# Patient Record
Sex: FEMALE | Race: WHITE | NOT HISPANIC OR LATINO | Employment: OTHER | URBAN - NONMETROPOLITAN AREA
[De-identification: names, ages, dates, MRNs, and addresses within clinical notes are randomized per-mention and may not be internally consistent; named-entity substitution may affect disease eponyms.]

---

## 2017-03-17 ENCOUNTER — OFFICE VISIT (OUTPATIENT)
Dept: CARDIOLOGY | Facility: CLINIC | Age: 57
End: 2017-03-17

## 2017-03-17 VITALS
SYSTOLIC BLOOD PRESSURE: 136 MMHG | OXYGEN SATURATION: 100 % | HEIGHT: 67 IN | HEART RATE: 63 BPM | WEIGHT: 293 LBS | DIASTOLIC BLOOD PRESSURE: 80 MMHG | BODY MASS INDEX: 45.99 KG/M2

## 2017-03-17 DIAGNOSIS — E11.9 TYPE 2 DIABETES MELLITUS WITHOUT COMPLICATION, WITHOUT LONG-TERM CURRENT USE OF INSULIN (HCC): ICD-10-CM

## 2017-03-17 DIAGNOSIS — E78.2 MIXED HYPERLIPIDEMIA: ICD-10-CM

## 2017-03-17 DIAGNOSIS — R00.2 PALPITATION: ICD-10-CM

## 2017-03-17 DIAGNOSIS — R07.9 CHEST PAIN, UNSPECIFIED TYPE: Primary | ICD-10-CM

## 2017-03-17 DIAGNOSIS — E66.01 MORBID OBESITY DUE TO EXCESS CALORIES (HCC): ICD-10-CM

## 2017-03-17 DIAGNOSIS — G47.33 OSA TREATED WITH BIPAP: ICD-10-CM

## 2017-03-17 DIAGNOSIS — I10 ESSENTIAL HYPERTENSION: ICD-10-CM

## 2017-03-17 DIAGNOSIS — R06.09 DOE (DYSPNEA ON EXERTION): ICD-10-CM

## 2017-03-17 PROCEDURE — 93000 ELECTROCARDIOGRAM COMPLETE: CPT | Performed by: INTERNAL MEDICINE

## 2017-03-17 PROCEDURE — 99204 OFFICE O/P NEW MOD 45 MIN: CPT | Performed by: INTERNAL MEDICINE

## 2017-03-17 RX ORDER — VENLAFAXINE HYDROCHLORIDE 150 MG/1
CAPSULE, EXTENDED RELEASE ORAL
COMMUNITY
End: 2017-03-17

## 2017-03-17 RX ORDER — NABUMETONE 750 MG/1
TABLET, FILM COATED ORAL
COMMUNITY
End: 2017-04-14

## 2017-03-17 RX ORDER — ONDANSETRON 4 MG/1
4 TABLET, FILM COATED ORAL EVERY 8 HOURS
Status: ON HOLD | COMMUNITY
Start: 2016-06-28 | End: 2017-04-19

## 2017-03-17 RX ORDER — DIAZEPAM 5 MG/1
TABLET ORAL EVERY 8 HOURS
COMMUNITY
End: 2017-03-17 | Stop reason: SDUPTHER

## 2017-03-17 RX ORDER — CIPROFLOXACIN 250 MG/1
TABLET, FILM COATED ORAL
COMMUNITY
Start: 2017-03-11 | End: 2017-04-14

## 2017-03-17 RX ORDER — GLYBURIDE 2.5 MG/1
TABLET ORAL
COMMUNITY
End: 2017-04-14

## 2017-03-17 RX ORDER — PROMETHAZINE HYDROCHLORIDE AND CODEINE PHOSPHATE 6.25; 1 MG/5ML; MG/5ML
SYRUP ORAL
COMMUNITY
End: 2017-04-14

## 2017-03-17 RX ORDER — LISINOPRIL 10 MG/1
TABLET ORAL
COMMUNITY
End: 2017-10-16

## 2017-03-17 RX ORDER — ESCITALOPRAM OXALATE 20 MG/1
TABLET ORAL
COMMUNITY
Start: 2016-12-20 | End: 2017-03-17 | Stop reason: SDUPTHER

## 2017-03-17 RX ORDER — DOXEPIN HYDROCHLORIDE 100 MG/1
100 CAPSULE ORAL NIGHTLY
COMMUNITY
Start: 2017-03-10 | End: 2019-07-29

## 2017-03-17 RX ORDER — DICLOFENAC SODIUM 75 MG/1
75 TABLET, DELAYED RELEASE ORAL 2 TIMES DAILY
COMMUNITY
Start: 2017-03-10 | End: 2017-04-20 | Stop reason: HOSPADM

## 2017-03-17 RX ORDER — ESCITALOPRAM OXALATE 20 MG/1
TABLET ORAL
COMMUNITY
End: 2018-01-22

## 2017-03-17 RX ORDER — ALBUTEROL SULFATE 90 UG/1
2 AEROSOL, METERED RESPIRATORY (INHALATION)
COMMUNITY

## 2017-03-17 RX ORDER — KETOCONAZOLE 200 MG/1
TABLET ORAL DAILY PRN
COMMUNITY

## 2017-03-17 RX ORDER — SIMVASTATIN 10 MG
TABLET ORAL
COMMUNITY
End: 2017-03-17

## 2017-03-17 RX ORDER — METOPROLOL TARTRATE 50 MG/1
TABLET, FILM COATED ORAL
COMMUNITY
Start: 2017-02-15 | End: 2017-03-17 | Stop reason: SDUPTHER

## 2017-03-17 RX ORDER — VITAMIN E 200 UNIT
CAPSULE ORAL
COMMUNITY
End: 2020-03-17

## 2017-03-17 RX ORDER — METHOCARBAMOL 500 MG/1
TABLET, FILM COATED ORAL
COMMUNITY
Start: 2016-12-14 | End: 2017-04-14

## 2017-03-17 RX ORDER — LISINOPRIL 10 MG/1
TABLET ORAL
COMMUNITY
Start: 2017-03-03 | End: 2017-03-17 | Stop reason: SDUPTHER

## 2017-03-17 RX ORDER — PHENOL 1.4 %
AEROSOL, SPRAY (ML) MUCOUS MEMBRANE
Status: ON HOLD | COMMUNITY
End: 2017-04-19

## 2017-03-17 RX ORDER — METOPROLOL SUCCINATE 25 MG/1
TABLET, EXTENDED RELEASE ORAL
COMMUNITY
Start: 2016-12-09 | End: 2017-04-14 | Stop reason: SDUPTHER

## 2017-03-17 RX ORDER — POTASSIUM CHLORIDE 20 MEQ/1
TABLET, EXTENDED RELEASE ORAL
COMMUNITY
End: 2017-04-14

## 2017-03-17 RX ORDER — METOPROLOL SUCCINATE 50 MG/1
TABLET, EXTENDED RELEASE ORAL 2 TIMES DAILY
COMMUNITY
End: 2017-03-17 | Stop reason: SDUPTHER

## 2017-03-17 RX ORDER — VENLAFAXINE HYDROCHLORIDE 150 MG/1
300 CAPSULE, EXTENDED RELEASE ORAL DAILY
COMMUNITY
Start: 2016-12-20 | End: 2018-01-22

## 2017-03-17 RX ORDER — OXYCODONE AND ACETAMINOPHEN 10; 325 MG/1; MG/1
TABLET ORAL
COMMUNITY
Start: 2016-06-28 | End: 2017-04-14

## 2017-03-17 RX ORDER — DIAZEPAM 5 MG/1
5 TABLET ORAL EVERY 12 HOURS PRN
COMMUNITY
Start: 2017-02-24

## 2017-03-17 RX ORDER — GLIPIZIDE 10 MG/1
10 TABLET ORAL 3 TIMES DAILY
COMMUNITY
Start: 2017-02-27 | End: 2017-12-21

## 2017-03-17 RX ORDER — FEXOFENADINE HCL 180 MG/1
TABLET ORAL
COMMUNITY
End: 2021-05-17

## 2017-03-17 RX ORDER — MECLIZINE HCL 12.5 MG/1
12.5 TABLET ORAL 3 TIMES DAILY PRN
COMMUNITY

## 2017-03-17 RX ORDER — LOSARTAN POTASSIUM AND HYDROCHLOROTHIAZIDE 12.5; 5 MG/1; MG/1
1 TABLET ORAL 2 TIMES DAILY
COMMUNITY
End: 2019-07-29

## 2017-03-17 RX ORDER — TIZANIDINE 4 MG/1
4 TABLET ORAL 3 TIMES DAILY PRN
COMMUNITY
Start: 2017-02-06

## 2017-03-17 RX ORDER — TRAMADOL HYDROCHLORIDE 50 MG/1
50 TABLET ORAL EVERY 6 HOURS PRN
COMMUNITY
Start: 2017-02-15 | End: 2020-05-15 | Stop reason: HOSPADM

## 2017-03-17 RX ORDER — TRAMADOL HYDROCHLORIDE 50 MG/1
TABLET ORAL EVERY 6 HOURS
COMMUNITY
End: 2017-03-17

## 2017-03-17 RX ORDER — CYCLOBENZAPRINE HCL 10 MG
TABLET ORAL 3 TIMES DAILY
COMMUNITY
End: 2017-04-14

## 2017-03-17 RX ORDER — GLIPIZIDE 5 MG/1
TABLET ORAL
COMMUNITY
Start: 2016-12-09 | End: 2017-03-17 | Stop reason: SDUPTHER

## 2017-03-17 RX ORDER — DOXEPIN HYDROCHLORIDE 100 MG/1
CAPSULE ORAL
COMMUNITY
End: 2017-03-17 | Stop reason: SDUPTHER

## 2017-03-17 RX ORDER — FLUCONAZOLE 150 MG/1
TABLET ORAL
COMMUNITY
Start: 2017-01-19 | End: 2017-04-14

## 2017-03-17 RX ORDER — SIMVASTATIN 10 MG
10 TABLET ORAL NIGHTLY
COMMUNITY
Start: 2017-03-10

## 2017-03-17 RX ORDER — HYDROXYZINE PAMOATE 50 MG/1
CAPSULE ORAL
COMMUNITY
End: 2019-07-29

## 2017-03-17 NOTE — PROGRESS NOTES
Barbara Tapia  [id number]  1960  57 y.o.  female    Referring Provider: Briana Bartlett MD    Reason for  Visit: Shortness of breath, chest pain and bradycardia  Chest pain with exertion  No diaphoresis  No nausea  No radiation  Precipitated with exertion  Moderate dypnea  Compliant with medications    History of present illness:  Barbara Tapia is a 57 y.o. yo female with history of HTN who presents today for   Chief Complaint   Patient presents with   • Slow Heart Rate     new pt   • Dizziness     when standing and walking   .    History  Past Medical History   Diagnosis Date   • Asthma    • Diabetes mellitus    • Hypertension    • Knee contracture      total   • Sleep apnea    ,   Past Surgical History   Procedure Laterality Date   •  section     • Partial hymenectomy     ,   Family History   Problem Relation Age of Onset   • Heart disease Mother    • Heart disease Father    ,   Social History   Substance Use Topics   • Smoking status: Never Smoker   • Smokeless tobacco: Never Used   • Alcohol use No   ,     Medications  Current Outpatient Prescriptions   Medication Sig Dispense Refill   • ondansetron (ZOFRAN) 4 MG tablet Every 8 (Eight) Hours.     • oxyCODONE-acetaminophen (PERCOCET)  MG per tablet 1-2 tabs po q4-6 hrs prn pain     • albuterol (PROVENTIL HFA;VENTOLIN HFA) 108 (90 BASE) MCG/ACT inhaler Every 6 (Six) Hours.     • bisacodyl (DULCOLAX) 5 MG EC tablet Daily.     • ciprofloxacin (CIPRO) 250 MG tablet      • cyclobenzaprine (FLEXERIL) 10 MG tablet 3 (Three) Times a Day.     • diazePAM (VALIUM) 5 MG tablet      • diclofenac (VOLTAREN) 75 MG EC tablet      • doxepin (SINEquan) 100 MG capsule      • escitalopram (LEXAPRO) 20 MG tablet Take 20 mg by mouth daily     • fexofenadine (ALLEGRA) 180 MG tablet Take 180 mg by mouth daily     • fluconazole (DIFLUCAN) 150 MG tablet      • glipiZIDE (GLUCOTROL) 10 MG tablet      • glyBURIDE (DIAbeta) 2.5 MG tablet Take 2.5 mg by mouth 2 times daily  (with meals)     • hydrOXYzine (VISTARIL) 50 MG capsule Take 50 mg by mouth 2 times daily     • ketoconazole (NIZORAL) 200 MG tablet Take 200 mg by mouth daily     • lisinopril (PRINIVIL,ZESTRIL) 10 MG tablet Take 10 mg by mouth 2 times daily     • losartan-hydrochlorothiazide (HYZAAR) 50-12.5 MG per tablet 2 (Two) Times a Day.     • meclizine (ANTIVERT) 12.5 MG tablet 3 (Three) Times a Day.     • MEGARED OMEGA-3 KRILL  MG capsule Take by mouth daily     • metFORMIN (GLUCOPHAGE) 500 MG tablet      • methocarbamol (ROBAXIN) 500 MG tablet      • metoprolol succinate XL (TOPROL-XL) 25 MG 24 hr tablet      • metoprolol tartrate (LOPRESSOR) 25 MG tablet Take 0.5 tablets by mouth 2 (Two) Times a Day. 45 tablet 3   • Multiple Vitamins-Minerals (MULTIVITAMIN ADULTS 50+) tablet Take by mouth daily     • nabumetone (RELAFEN) 750 MG tablet Take 750 mg by mouth 2 times daily Indications: take 2 tablets twice daily     • nystatin (MYCOSTATIN) 072588 UNIT/ML suspension 4 (Four) Times a Day.     • potassium chloride (K-DUR,KLOR-CON) 20 MEQ CR tablet Take 20 mEq by mouth daily     • PROAIR  (90 BASE) MCG/ACT inhaler      • PROBIOTIC PRODUCT PO Take by mouth     • promethazine-codeine (PHENERGAN with CODEINE) 6.25-10 MG/5ML syrup Take 5 mLs by mouth 2 times daily     • simvastatin (ZOCOR) 10 MG tablet      • tiZANidine (ZANAFLEX) 4 MG tablet      • traMADol (ULTRAM) 50 MG tablet      • venlafaxine XR (EFFEXOR-XR) 150 MG 24 hr capsule        No current facility-administered medications for this visit.        Allergies:  Azelastine-fluticasone; Contrast dye; Iodides; Metformin and related; Oxycodone-aspirin; Pregabalin; Sulfa antibiotics; and Nisoldipine er    Review of Systems  Review of Systems   Constitution: Negative.   HENT: Negative.    Eyes: Negative.    Cardiovascular: Positive for dyspnea on exertion and palpitations. Negative for chest pain, claudication, cyanosis, irregular heartbeat, leg swelling,  "near-syncope, orthopnea, paroxysmal nocturnal dyspnea and syncope.   Respiratory: Negative.    Endocrine: Negative.    Hematologic/Lymphatic: Negative.    Skin: Negative.    Gastrointestinal: Negative for anorexia.   Genitourinary: Negative.    Neurological: Positive for dizziness.   Psychiatric/Behavioral: Negative.        Objective     Physical Exam:  Visit Vitals   • /80   • Pulse 63   • Ht 67\" (170.2 cm)   • Wt 297 lb (135 kg)   • SpO2 100%   • BMI 46.52 kg/m2     Physical Exam   Constitutional: She appears well-developed.   HENT:   Head: Normocephalic.   Neck: Normal carotid pulses and no JVD present. No tracheal tenderness present. Carotid bruit is not present. No tracheal deviation and no edema present.   Cardiovascular: Regular rhythm, normal heart sounds and normal pulses.    Pulmonary/Chest: Effort normal. No stridor.   Abdominal: Soft.   Neurological: She is alert. She has normal strength. No cranial nerve deficit or sensory deficit.   Skin: Skin is warm.   Psychiatric: She has a normal mood and affect. Her speech is normal and behavior is normal.       Results Review:       ECG 12 Lead  Date/Time: 3/17/2017 10:29 AM  Performed by: JERMAINE CHAVEZ  Authorized by: JERMAINE CHAVEZ   Comparison: not compared with previous ECG   Rhythm: sinus rhythm  Rate: normal  Clinical impression: normal ECG            Assessment/Plan   Patient Active Problem List   Diagnosis   • Palpitation   • Morbid obesity due to excess calories   • BLADIMIR treated with BiPAP   • Chest pain   • Essential hypertension   • Type 2 diabetes mellitus without complication, without long-term current use of insulin   • Mixed hyperlipidemia       Intermittent  palpitations. No significant pedal edema. Compliant with medications and diet. Latest labs and medications reviewed.    Plan:  CBC CMP TSH BNP  Echo  Stop Lisinopril  Dobutamine stress test   Holter  Decrease Metoprolol from 25 to 12.5 mg BID  Stop Metoprolol if heart rates less than " 50  Take Zanaflex PRN  Close follow up with you as scheduled.  Intensive factor modifications.  See order list.    Counseled regarding disease appropriate diet, fluid, caffeine, stimulants and sodium intake as well as importance of compliance to diet, exercise and regular follow up.  Avoid NSAIDS and COX2 inhibitors. Use Acetaminophen PRN.    Return in about 4 weeks (around 4/14/2017).

## 2017-04-06 ENCOUNTER — HOSPITAL ENCOUNTER (OUTPATIENT)
Dept: CARDIOLOGY | Facility: HOSPITAL | Age: 57
Discharge: HOME | End: 2017-04-06
Attending: INTERNAL MEDICINE

## 2017-04-06 ENCOUNTER — HOSPITAL ENCOUNTER (OUTPATIENT)
Dept: CARDIOLOGY | Facility: HOSPITAL | Age: 57
Discharge: HOME | End: 2017-04-06
Attending: INTERNAL MEDICINE | Admitting: INTERNAL MEDICINE

## 2017-04-06 VITALS — BODY MASS INDEX: 45.99 KG/M2 | HEART RATE: 59 BPM | WEIGHT: 293 LBS | HEIGHT: 67 IN

## 2017-04-06 VITALS
WEIGHT: 293 LBS | HEIGHT: 67 IN | BODY MASS INDEX: 45.99 KG/M2 | DIASTOLIC BLOOD PRESSURE: 73 MMHG | SYSTOLIC BLOOD PRESSURE: 147 MMHG

## 2017-04-06 DIAGNOSIS — R00.2 PALPITATION: ICD-10-CM

## 2017-04-06 DIAGNOSIS — R07.9 CHEST PAIN, UNSPECIFIED TYPE: ICD-10-CM

## 2017-04-06 LAB
BH CV ECHO MEAS - BSA(HAYCOCK): 2.6 M^2
BH CV ECHO MEAS - BSA: 2.4 M^2
BH CV ECHO MEAS - BZI_BMI: 46.5 KILOGRAMS/M^2
BH CV ECHO MEAS - BZI_METRIC_HEIGHT: 170.2 CM
BH CV ECHO MEAS - BZI_METRIC_WEIGHT: 134.7 KG
BH CV STRESS BP STAGE 1: NORMAL
BH CV STRESS BP STAGE 2: NORMAL
BH CV STRESS BP STAGE 3: NORMAL
BH CV STRESS DOB - ATROPINE STAGE 3: 1
BH CV STRESS DOSE DOBUTAMINE STAGE 1: 10
BH CV STRESS DOSE DOBUTAMINE STAGE 2: 20
BH CV STRESS DOSE DOBUTAMINE STAGE 3: 30
BH CV STRESS DURATION MIN STAGE 1: 3
BH CV STRESS DURATION MIN STAGE 2: 4
BH CV STRESS DURATION MIN STAGE 3: 4
BH CV STRESS DURATION SEC STAGE 1: 0
BH CV STRESS DURATION SEC STAGE 2: 0
BH CV STRESS DURATION SEC STAGE 3: 54
BH CV STRESS ECHO POST STRESS EJECTION FRACTION EF: 65 %
BH CV STRESS HR STAGE 1: 64
BH CV STRESS HR STAGE 2: 80
BH CV STRESS HR STAGE 3: 142
BH CV STRESS PROTOCOL 1: NORMAL
BH CV STRESS RATE STAGE 1: 81
BH CV STRESS RATE STAGE 2: 162
BH CV STRESS RATE STAGE 3: 243
BH CV STRESS RECOVERY BP: NORMAL MMHG
BH CV STRESS RECOVERY HR: 90 BPM
BH CV STRESS STAGE 1: 1
BH CV STRESS STAGE 2: 2
BH CV STRESS STAGE 3: 3
LV EF 2D ECHO EST: 55 %
MAXIMAL PREDICTED HEART RATE: 161 BPM
PERCENT MAX PREDICTED HR: 88.2 %
STRESS BASELINE BP: NORMAL MMHG
STRESS BASELINE HR: 60 BPM
STRESS PERCENT HR: 104 %
STRESS POST PEAK BP: NORMAL MMHG
STRESS POST PEAK HR: 142 BPM
STRESS TARGET HR: 137 BPM

## 2017-04-06 PROCEDURE — 93018 CV STRESS TEST I&R ONLY: CPT | Performed by: INTERNAL MEDICINE

## 2017-04-06 PROCEDURE — C8928 TTE W OR W/O FOL W/CON,STRES: HCPCS

## 2017-04-06 PROCEDURE — 93306 TTE W/DOPPLER COMPLETE: CPT | Performed by: INTERNAL MEDICINE

## 2017-04-06 PROCEDURE — 93350 STRESS TTE ONLY: CPT | Performed by: INTERNAL MEDICINE

## 2017-04-06 PROCEDURE — 25010000003 DOBUTAMINE PER 250 MG: Performed by: INTERNAL MEDICINE

## 2017-04-06 PROCEDURE — 93226 XTRNL ECG REC<48 HR SCAN A/R: CPT

## 2017-04-06 PROCEDURE — 93225 XTRNL ECG REC<48 HRS REC: CPT

## 2017-04-06 PROCEDURE — 25010000002 PERFLUTREN (DEFINITY) 8.476 MG IN SODIUM CHLORIDE 10 ML INJECTION: Performed by: INTERNAL MEDICINE

## 2017-04-06 PROCEDURE — 93306 TTE W/DOPPLER COMPLETE: CPT

## 2017-04-06 PROCEDURE — 93017 CV STRESS TEST TRACING ONLY: CPT

## 2017-04-06 PROCEDURE — 93352 ADMIN ECG CONTRAST AGENT: CPT | Performed by: INTERNAL MEDICINE

## 2017-04-06 RX ORDER — DOBUTAMINE HYDROCHLORIDE 100 MG/100ML
10-50 INJECTION INTRAVENOUS
Status: DISCONTINUED | OUTPATIENT
Start: 2017-04-06 | End: 2017-04-07 | Stop reason: HOSPADM

## 2017-04-06 RX ORDER — ATROPINE SULFATE 1 MG/ML
1 INJECTION, SOLUTION INTRAMUSCULAR; INTRAVENOUS; SUBCUTANEOUS ONCE
Status: COMPLETED | OUTPATIENT
Start: 2017-04-06 | End: 2017-04-06

## 2017-04-06 RX ORDER — ATROPINE SULFATE 1 MG/ML
0.5 INJECTION, SOLUTION INTRAMUSCULAR; INTRAVENOUS; SUBCUTANEOUS ONCE
Status: DISCONTINUED | OUTPATIENT
Start: 2017-04-06 | End: 2017-04-06

## 2017-04-06 RX ADMIN — DOBUTAMINE HYDROCHLORIDE 10 MCG/KG/MIN: 100 INJECTION INTRAVENOUS at 10:33

## 2017-04-06 RX ADMIN — SODIUM CHLORIDE 5 ML: 9 INJECTION INTRAMUSCULAR; INTRAVENOUS; SUBCUTANEOUS at 10:23

## 2017-04-06 RX ADMIN — ATROPINE SULFATE 1 MG: 1 INJECTION, SOLUTION INTRAMUSCULAR; INTRAVENOUS; SUBCUTANEOUS at 10:42

## 2017-04-08 LAB
BH CV ECHO MEAS - AO MAX PG (FULL): 1.9 MMHG
BH CV ECHO MEAS - AO MAX PG: 6.4 MMHG
BH CV ECHO MEAS - AO MEAN PG (FULL): 2 MMHG
BH CV ECHO MEAS - AO MEAN PG: 4 MMHG
BH CV ECHO MEAS - AO ROOT AREA (BSA CORRECTED): 1.3
BH CV ECHO MEAS - AO ROOT AREA: 7.1 CM^2
BH CV ECHO MEAS - AO ROOT DIAM: 3 CM
BH CV ECHO MEAS - AO V2 MAX: 126 CM/SEC
BH CV ECHO MEAS - AO V2 MEAN: 93.9 CM/SEC
BH CV ECHO MEAS - AO V2 VTI: 31.6 CM
BH CV ECHO MEAS - AVA(I,A): 2.5 CM^2
BH CV ECHO MEAS - AVA(I,D): 2.5 CM^2
BH CV ECHO MEAS - AVA(V,A): 2.6 CM^2
BH CV ECHO MEAS - AVA(V,D): 2.6 CM^2
BH CV ECHO MEAS - BSA(HAYCOCK): 2.6 M^2
BH CV ECHO MEAS - BSA: 2.4 M^2
BH CV ECHO MEAS - BZI_BMI: 46.5 KILOGRAMS/M^2
BH CV ECHO MEAS - BZI_METRIC_HEIGHT: 170.2 CM
BH CV ECHO MEAS - BZI_METRIC_WEIGHT: 134.7 KG
BH CV ECHO MEAS - CONTRAST EF 4CH: 64.2 ML/M^2
BH CV ECHO MEAS - EDV(CUBED): 114.1 ML
BH CV ECHO MEAS - EDV(MOD-SP4): 141 ML
BH CV ECHO MEAS - EDV(TEICH): 110.2 ML
BH CV ECHO MEAS - EF(CUBED): 73.1 %
BH CV ECHO MEAS - EF(MOD-SP4): 64.2 %
BH CV ECHO MEAS - EF(TEICH): 64.8 %
BH CV ECHO MEAS - ESV(CUBED): 30.7 ML
BH CV ECHO MEAS - ESV(MOD-SP4): 50.5 ML
BH CV ECHO MEAS - ESV(TEICH): 38.8 ML
BH CV ECHO MEAS - FS: 35.5 %
BH CV ECHO MEAS - IVS/LVPW: 0.99
BH CV ECHO MEAS - IVSD: 1.1 CM
BH CV ECHO MEAS - LA DIMENSION: 3.6 CM
BH CV ECHO MEAS - LA/AO: 1.2
BH CV ECHO MEAS - LAT PEAK E' VEL: 9.7 CM/SEC
BH CV ECHO MEAS - LV DIASTOLIC VOL/BSA (35-75): 58.9 ML/M^2
BH CV ECHO MEAS - LV MASS(C)D: 203.5 GRAMS
BH CV ECHO MEAS - LV MASS(C)DI: 85.1 GRAMS/M^2
BH CV ECHO MEAS - LV MAX PG: 4.4 MMHG
BH CV ECHO MEAS - LV MEAN PG: 2 MMHG
BH CV ECHO MEAS - LV SYSTOLIC VOL/BSA (12-30): 21.1 ML/M^2
BH CV ECHO MEAS - LV V1 MAX: 105 CM/SEC
BH CV ECHO MEAS - LV V1 MEAN: 68.9 CM/SEC
BH CV ECHO MEAS - LV V1 VTI: 25.3 CM
BH CV ECHO MEAS - LVIDD: 4.9 CM
BH CV ECHO MEAS - LVIDS: 3.1 CM
BH CV ECHO MEAS - LVLD AP4: 8.7 CM
BH CV ECHO MEAS - LVLS AP4: 7.1 CM
BH CV ECHO MEAS - LVOT AREA (M): 3.1 CM^2
BH CV ECHO MEAS - LVOT AREA: 3.1 CM^2
BH CV ECHO MEAS - LVOT DIAM: 2 CM
BH CV ECHO MEAS - LVPWD: 1.1 CM
BH CV ECHO MEAS - MED PEAK E' VEL: 6.31 CM/SEC
BH CV ECHO MEAS - MV A MAX VEL: 76.4 CM/SEC
BH CV ECHO MEAS - MV DEC TIME: 0.27 SEC
BH CV ECHO MEAS - MV E MAX VEL: 90 CM/SEC
BH CV ECHO MEAS - MV E/A: 1.2
BH CV ECHO MEAS - RAP SYSTOLE: 10 MMHG
BH CV ECHO MEAS - RVSP: 33 MMHG
BH CV ECHO MEAS - SI(AO): 93.4 ML/M^2
BH CV ECHO MEAS - SI(CUBED): 34.9 ML/M^2
BH CV ECHO MEAS - SI(LVOT): 33.2 ML/M^2
BH CV ECHO MEAS - SI(MOD-SP4): 37.8 ML/M^2
BH CV ECHO MEAS - SI(TEICH): 29.8 ML/M^2
BH CV ECHO MEAS - SV(AO): 223.4 ML
BH CV ECHO MEAS - SV(CUBED): 83.4 ML
BH CV ECHO MEAS - SV(LVOT): 79.5 ML
BH CV ECHO MEAS - SV(MOD-SP4): 90.5 ML
BH CV ECHO MEAS - SV(TEICH): 71.3 ML
BH CV ECHO MEAS - TR MAX VEL: 240 CM/SEC
LEFT ATRIUM VOLUME INDEX: 17.7 ML/M2
LEFT ATRIUM VOLUME: 42.2 CM3
LV EF 2D ECHO EST: 60 %

## 2017-04-10 PROCEDURE — 93227 XTRNL ECG REC<48 HR R&I: CPT | Performed by: INTERNAL MEDICINE

## 2017-04-14 ENCOUNTER — HOSPITAL ENCOUNTER (OUTPATIENT)
Dept: CARDIOLOGY | Facility: HOSPITAL | Age: 57
Discharge: HOME | End: 2017-04-14
Admitting: INTERNAL MEDICINE

## 2017-04-14 ENCOUNTER — HOSPITAL ENCOUNTER (OUTPATIENT)
Dept: GENERAL RADIOLOGY | Facility: HOSPITAL | Age: 57
Discharge: HOME | End: 2017-04-14

## 2017-04-14 ENCOUNTER — OFFICE VISIT (OUTPATIENT)
Dept: CARDIOLOGY | Facility: CLINIC | Age: 57
End: 2017-04-14

## 2017-04-14 VITALS
OXYGEN SATURATION: 95 % | DIASTOLIC BLOOD PRESSURE: 76 MMHG | HEART RATE: 68 BPM | WEIGHT: 293 LBS | HEIGHT: 67 IN | SYSTOLIC BLOOD PRESSURE: 126 MMHG | BODY MASS INDEX: 45.99 KG/M2

## 2017-04-14 DIAGNOSIS — R00.2 PALPITATION: Primary | ICD-10-CM

## 2017-04-14 DIAGNOSIS — G47.33 OSA TREATED WITH BIPAP: ICD-10-CM

## 2017-04-14 DIAGNOSIS — R07.9 CHEST PAIN, UNSPECIFIED TYPE: ICD-10-CM

## 2017-04-14 DIAGNOSIS — E11.9 TYPE 2 DIABETES MELLITUS WITHOUT COMPLICATION, WITHOUT LONG-TERM CURRENT USE OF INSULIN (HCC): ICD-10-CM

## 2017-04-14 DIAGNOSIS — R00.2 PALPITATION: ICD-10-CM

## 2017-04-14 DIAGNOSIS — R06.09 DOE (DYSPNEA ON EXERTION): ICD-10-CM

## 2017-04-14 DIAGNOSIS — I10 ESSENTIAL HYPERTENSION: ICD-10-CM

## 2017-04-14 DIAGNOSIS — E78.2 MIXED HYPERLIPIDEMIA: ICD-10-CM

## 2017-04-14 LAB
ALBUMIN SERPL-MCNC: 4.4 G/DL (ref 3.5–5)
ALBUMIN/GLOB SERPL: 1.3 G/DL (ref 1.1–2.5)
ALP SERPL-CCNC: 87 U/L (ref 24–120)
ALT SERPL W P-5'-P-CCNC: 64 U/L (ref 0–54)
ANION GAP SERPL CALCULATED.3IONS-SCNC: 14 MMOL/L (ref 4–13)
AST SERPL-CCNC: 51 U/L (ref 7–45)
BASOPHILS # BLD AUTO: 0.02 10*3/MM3 (ref 0–0.2)
BASOPHILS NFR BLD AUTO: 0.3 % (ref 0–2)
BILIRUB SERPL-MCNC: 0.5 MG/DL (ref 0.1–1)
BUN BLD-MCNC: 36 MG/DL (ref 5–21)
BUN/CREAT SERPL: 16.1 (ref 7–25)
CALCIUM SPEC-SCNC: 9.6 MG/DL (ref 8.4–10.4)
CHLORIDE SERPL-SCNC: 100 MMOL/L (ref 98–110)
CO2 SERPL-SCNC: 25 MMOL/L (ref 24–31)
CREAT BLD-MCNC: 2.23 MG/DL (ref 0.5–1.4)
DEPRECATED RDW RBC AUTO: 43.4 FL (ref 40–54)
EOSINOPHIL # BLD AUTO: 0 10*3/MM3 (ref 0–0.7)
EOSINOPHIL NFR BLD AUTO: 0 % (ref 0–4)
ERYTHROCYTE [DISTWIDTH] IN BLOOD BY AUTOMATED COUNT: 13.1 % (ref 12–15)
GFR SERPL CREATININE-BSD FRML MDRD: 22 ML/MIN/1.73
GLOBULIN UR ELPH-MCNC: 3.3 GM/DL
GLUCOSE BLD-MCNC: 119 MG/DL (ref 70–100)
HCT VFR BLD AUTO: 36 % (ref 37–47)
HGB BLD-MCNC: 12.1 G/DL (ref 12–16)
IMM GRANULOCYTES # BLD: 0.02 10*3/MM3 (ref 0–0.03)
IMM GRANULOCYTES NFR BLD: 0.3 % (ref 0–5)
INR PPP: 0.89 (ref 0.91–1.09)
LYMPHOCYTES # BLD AUTO: 1.93 10*3/MM3 (ref 0.72–4.86)
LYMPHOCYTES NFR BLD AUTO: 27.3 % (ref 15–45)
MCH RBC QN AUTO: 30.4 PG (ref 28–32)
MCHC RBC AUTO-ENTMCNC: 33.6 G/DL (ref 33–36)
MCV RBC AUTO: 90.5 FL (ref 82–98)
MONOCYTES # BLD AUTO: 0.49 10*3/MM3 (ref 0.19–1.3)
MONOCYTES NFR BLD AUTO: 6.9 % (ref 4–12)
NEUTROPHILS # BLD AUTO: 4.62 10*3/MM3 (ref 1.87–8.4)
NEUTROPHILS NFR BLD AUTO: 65.2 % (ref 39–78)
NT-PROBNP SERPL-MCNC: 222 PG/ML (ref 0–900)
PLATELET # BLD AUTO: 308 10*3/MM3 (ref 130–400)
PMV BLD AUTO: 9.9 FL (ref 6–12)
POTASSIUM BLD-SCNC: 4.7 MMOL/L (ref 3.5–5.3)
PROT SERPL-MCNC: 7.7 G/DL (ref 6.3–8.7)
PROTHROMBIN TIME: 12.3 SECONDS (ref 11.9–14.6)
RBC # BLD AUTO: 3.98 10*6/MM3 (ref 4.2–5.4)
SODIUM BLD-SCNC: 139 MMOL/L (ref 135–145)
TSH SERPL DL<=0.05 MIU/L-ACNC: 2.25 MIU/ML (ref 0.47–4.68)
WBC NRBC COR # BLD: 7.08 10*3/MM3 (ref 4.8–10.8)

## 2017-04-14 PROCEDURE — 85025 COMPLETE CBC W/AUTO DIFF WBC: CPT | Performed by: INTERNAL MEDICINE

## 2017-04-14 PROCEDURE — 85610 PROTHROMBIN TIME: CPT | Performed by: INTERNAL MEDICINE

## 2017-04-14 PROCEDURE — 99214 OFFICE O/P EST MOD 30 MIN: CPT | Performed by: INTERNAL MEDICINE

## 2017-04-14 PROCEDURE — 84443 ASSAY THYROID STIM HORMONE: CPT | Performed by: INTERNAL MEDICINE

## 2017-04-14 PROCEDURE — 83880 ASSAY OF NATRIURETIC PEPTIDE: CPT | Performed by: INTERNAL MEDICINE

## 2017-04-14 PROCEDURE — 71020 HC CHEST PA AND LATERAL: CPT

## 2017-04-14 PROCEDURE — 36415 COLL VENOUS BLD VENIPUNCTURE: CPT

## 2017-04-14 PROCEDURE — 80053 COMPREHEN METABOLIC PANEL: CPT | Performed by: INTERNAL MEDICINE

## 2017-04-14 RX ORDER — LACTOBACILLUS RHAMNOSUS GG 10B CELL
1 CAPSULE ORAL DAILY
COMMUNITY
End: 2021-05-17

## 2017-04-14 RX ORDER — PREDNISONE 20 MG/1
60 TABLET ORAL 2 TIMES DAILY
Qty: 6 TABLET | Refills: 1 | Status: ON HOLD | OUTPATIENT
Start: 2017-04-14 | End: 2017-04-19

## 2017-04-14 RX ORDER — SODIUM CHLORIDE 9 MG/ML
3 INJECTION, SOLUTION INTRAVENOUS ONCE
Status: CANCELLED | OUTPATIENT
Start: 2017-04-14 | End: 2017-04-14

## 2017-04-14 RX ORDER — SODIUM CHLORIDE 0.9 % (FLUSH) 0.9 %
1-10 SYRINGE (ML) INJECTION AS NEEDED
Status: CANCELLED | OUTPATIENT
Start: 2017-04-14

## 2017-04-14 NOTE — PROGRESS NOTES
Barbara Tapia  [id number]  1960  57 y.o.  female    Referring Provider: Briana Bartlett MD    Reason for  Visit: Shortness of breath, chest pain and bradycardia  Chest pain with exertion  No diaphoresis  No nausea  No radiation  Precipitated with exertion  Moderate dypnea  Compliant with medications    History of present illness:  Barbara Tapia is a 57 y.o. yo female with history of HTN who presents today for   Chief Complaint   Patient presents with   • Chest Pain     1 mo f/u - results   .    History  Past Medical History:   Diagnosis Date   • Asthma    • Chest pain    • Diabetes mellitus    • Hypertension    • Knee contracture     total   • Sleep apnea    ,   Past Surgical History:   Procedure Laterality Date   • CERVICAL FUSION      STITCHED UP TO PREVENT LOSING BABY   • CERVIX LESION DESTRUCTION     •  SECTION     • ELBOW PROCEDURE      screws placed x 2   • EXCISION MASS TRUNK      LEFT SIDE CAT SCRATCH FEVER    • PARTIAL HYMENECTOMY     • SINUS SURGERY     ,   Family History   Problem Relation Age of Onset   • Heart disease Mother    • Heart disease Father    ,   Social History   Substance Use Topics   • Smoking status: Never Smoker   • Smokeless tobacco: Never Used   • Alcohol use No   ,     Medications  Current Outpatient Prescriptions   Medication Sig Dispense Refill   • albuterol (PROVENTIL HFA;VENTOLIN HFA) 108 (90 BASE) MCG/ACT inhaler Every 6 (Six) Hours.     • bisacodyl (DULCOLAX) 5 MG EC tablet Daily.     • diazePAM (VALIUM) 5 MG tablet      • diclofenac (VOLTAREN) 75 MG EC tablet 75 mg 2 (Two) Times a Day.     • doxepin (SINEquan) 100 MG capsule      • Ergocalciferol (VITAMIN D2 PO) Take 1.25 mg by mouth 1 (One) Time Per Week. 1 time a week for 1 month     • escitalopram (LEXAPRO) 20 MG tablet Take 20 mg by mouth daily     • fexofenadine (ALLEGRA) 180 MG tablet Take 180 mg by mouth daily     • glipiZIDE (GLUCOTROL) 10 MG tablet      • hydrOXYzine (VISTARIL) 50 MG capsule Take 50 mg by  "mouth 2 times daily     • ketoconazole (NIZORAL) 200 MG tablet Take 200 mg by mouth daily     • lisinopril (PRINIVIL,ZESTRIL) 10 MG tablet Take 10 mg by mouth 2 times daily     • losartan-hydrochlorothiazide (HYZAAR) 50-12.5 MG per tablet 2 (Two) Times a Day.     • meclizine (ANTIVERT) 12.5 MG tablet 3 (Three) Times a Day.     • MEGARED OMEGA-3 KRILL  MG capsule Take by mouth daily     • metoprolol tartrate (LOPRESSOR) 25 MG tablet Take 0.5 tablets by mouth 2 (Two) Times a Day. 45 tablet 3   • Multiple Vitamins-Minerals (MULTIVITAMIN ADULTS 50+) tablet Take by mouth daily     • nystatin (MYCOSTATIN) 210414 UNIT/ML suspension 4 (Four) Times a Day.     • ondansetron (ZOFRAN) 4 MG tablet Every 8 (Eight) Hours.     • probiotic (CULTURELLE) capsule capsule Take  by mouth Daily.     • Sennosides (SENOKOT PO) Take  by mouth.     • simvastatin (ZOCOR) 10 MG tablet      • tiZANidine (ZANAFLEX) 4 MG tablet      • traMADol (ULTRAM) 50 MG tablet      • venlafaxine XR (EFFEXOR-XR) 150 MG 24 hr capsule Take 150 mg by mouth 2 (Two) Times a Day.       No current facility-administered medications for this visit.        Allergies:  Azelastine-fluticasone; Contrast dye; Iodides; Metformin and related; Oxycodone-aspirin; Pregabalin; Sulfa antibiotics; and Nisoldipine er    Review of Systems  Review of Systems   Constitution: Negative.   HENT: Negative.    Eyes: Negative.    Cardiovascular: Positive for dyspnea on exertion and palpitations. Negative for chest pain, claudication, cyanosis, irregular heartbeat, leg swelling, near-syncope, orthopnea, paroxysmal nocturnal dyspnea and syncope.   Respiratory: Negative.    Endocrine: Negative.    Hematologic/Lymphatic: Negative.    Skin: Negative.    Gastrointestinal: Negative for anorexia.   Genitourinary: Negative.    Neurological: Positive for dizziness.   Psychiatric/Behavioral: Negative.        Objective     Physical Exam:  /76  Pulse 68  Ht 67\" (170.2 cm)  Wt 295 lb (134 " kg)  SpO2 95%  BMI 46.2 kg/m2  Physical Exam   Constitutional: She appears well-developed.   HENT:   Head: Normocephalic.   Neck: Normal carotid pulses and no JVD present. No tracheal tenderness present. Carotid bruit is not present. No tracheal deviation and no edema present.   Cardiovascular: Regular rhythm, normal heart sounds and normal pulses.    Pulmonary/Chest: Effort normal. No stridor.   Abdominal: Soft.   Neurological: She is alert. She has normal strength. No cranial nerve deficit or sensory deficit.   Skin: Skin is warm.   Psychiatric: She has a normal mood and affect. Her speech is normal and behavior is normal.       Results Review:     Procedures    Assessment/Plan   Patient Active Problem List   Diagnosis   • Palpitation   • Morbid obesity due to excess calories   • BLADIMIR treated with BiPAP   • Chest pain   • Essential hypertension   • Type 2 diabetes mellitus without complication, without long-term current use of insulin   • Mixed hyperlipidemia       Intermittent  palpitations. No significant pedal edema. Compliant with medications and diet. Latest labs and medications reviewed.    Plan:    As recurrent chest pain with exertion and moderate shortness  No C/I to [facility]  Pretreat for Iodine allergy  Has taken PO steroids in past  Right radial approach  Take Zanaflex PRN  Close follow up with you as scheduled.  Intensive factor modifications.  See order list.    Counseled regarding disease appropriate diet, fluid, caffeine, stimulants and sodium intake as well as importance of compliance to diet, exercise and regular follow up.  Avoid NSAIDS and COX2 inhibitors. Use Acetaminophen PRN.    Return in about 3 months (around 7/14/2017).

## 2017-04-19 ENCOUNTER — HOSPITAL ENCOUNTER (OUTPATIENT)
Facility: HOSPITAL | Age: 57
Discharge: HOME | End: 2017-04-20
Attending: INTERNAL MEDICINE | Admitting: INTERNAL MEDICINE

## 2017-04-19 DIAGNOSIS — R07.9 CHEST PAIN, UNSPECIFIED TYPE: ICD-10-CM

## 2017-04-19 LAB
GLUCOSE BLDC GLUCOMTR-MCNC: 194 MG/DL (ref 70–130)
GLUCOSE BLDC GLUCOMTR-MCNC: 318 MG/DL (ref 70–130)
GLUCOSE BLDC GLUCOMTR-MCNC: 352 MG/DL (ref 70–130)

## 2017-04-19 PROCEDURE — 82962 GLUCOSE BLOOD TEST: CPT

## 2017-04-19 PROCEDURE — 25010000002 MIDAZOLAM PER 1 MG: Performed by: INTERNAL MEDICINE

## 2017-04-19 PROCEDURE — 93458 L HRT ARTERY/VENTRICLE ANGIO: CPT | Performed by: INTERNAL MEDICINE

## 2017-04-19 PROCEDURE — C1894 INTRO/SHEATH, NON-LASER: HCPCS | Performed by: INTERNAL MEDICINE

## 2017-04-19 PROCEDURE — 0 IOPAMIDOL PER 1 ML: Performed by: INTERNAL MEDICINE

## 2017-04-19 PROCEDURE — G0378 HOSPITAL OBSERVATION PER HR: HCPCS

## 2017-04-19 PROCEDURE — S0260 H&P FOR SURGERY: HCPCS | Performed by: INTERNAL MEDICINE

## 2017-04-19 PROCEDURE — 25010000002 FENTANYL CITRATE (PF) 100 MCG/2ML SOLUTION: Performed by: INTERNAL MEDICINE

## 2017-04-19 PROCEDURE — 25010000002 METHYLPREDNISOLONE PER 125 MG: Performed by: INTERNAL MEDICINE

## 2017-04-19 PROCEDURE — 94799 UNLISTED PULMONARY SVC/PX: CPT

## 2017-04-19 PROCEDURE — C1769 GUIDE WIRE: HCPCS | Performed by: INTERNAL MEDICINE

## 2017-04-19 PROCEDURE — 63710000001 INSULIN LISPRO (HUMAN) PER 5 UNITS: Performed by: INTERNAL MEDICINE

## 2017-04-19 PROCEDURE — 25010000002 DIPHENHYDRAMINE PER 50 MG: Performed by: INTERNAL MEDICINE

## 2017-04-19 RX ORDER — DIPHENHYDRAMINE HYDROCHLORIDE 50 MG/ML
INJECTION INTRAMUSCULAR; INTRAVENOUS AS NEEDED
Status: DISCONTINUED | OUTPATIENT
Start: 2017-04-19 | End: 2017-04-19 | Stop reason: HOSPADM

## 2017-04-19 RX ORDER — DOXEPIN HYDROCHLORIDE 100 MG/1
100 CAPSULE ORAL NIGHTLY
Status: DISCONTINUED | OUTPATIENT
Start: 2017-04-19 | End: 2017-04-20 | Stop reason: HOSPADM

## 2017-04-19 RX ORDER — LISINOPRIL 10 MG/1
10 TABLET ORAL
Status: DISCONTINUED | OUTPATIENT
Start: 2017-04-19 | End: 2017-04-20 | Stop reason: HOSPADM

## 2017-04-19 RX ORDER — BISACODYL 5 MG/1
25 TABLET, DELAYED RELEASE ORAL EVERY OTHER DAY
Status: DISCONTINUED | OUTPATIENT
Start: 2017-04-19 | End: 2017-04-20 | Stop reason: HOSPADM

## 2017-04-19 RX ORDER — FENTANYL CITRATE 50 UG/ML
INJECTION, SOLUTION INTRAMUSCULAR; INTRAVENOUS AS NEEDED
Status: DISCONTINUED | OUTPATIENT
Start: 2017-04-19 | End: 2017-04-19 | Stop reason: HOSPADM

## 2017-04-19 RX ORDER — MECLIZINE HCL 12.5 MG/1
12.5 TABLET ORAL 3 TIMES DAILY
Status: DISCONTINUED | OUTPATIENT
Start: 2017-04-19 | End: 2017-04-20 | Stop reason: HOSPADM

## 2017-04-19 RX ORDER — MIDAZOLAM HYDROCHLORIDE 1 MG/ML
INJECTION INTRAMUSCULAR; INTRAVENOUS AS NEEDED
Status: DISCONTINUED | OUTPATIENT
Start: 2017-04-19 | End: 2017-04-19 | Stop reason: HOSPADM

## 2017-04-19 RX ORDER — SODIUM CHLORIDE 9 MG/ML
3 INJECTION, SOLUTION INTRAVENOUS ONCE
Status: COMPLETED | OUTPATIENT
Start: 2017-04-19 | End: 2017-04-19

## 2017-04-19 RX ORDER — SODIUM CHLORIDE 9 MG/ML
100 INJECTION, SOLUTION INTRAVENOUS CONTINUOUS
Status: DISCONTINUED | OUTPATIENT
Start: 2017-04-19 | End: 2017-04-20

## 2017-04-19 RX ORDER — NICOTINE POLACRILEX 4 MG
15 LOZENGE BUCCAL
Status: DISCONTINUED | OUTPATIENT
Start: 2017-04-19 | End: 2017-04-20 | Stop reason: HOSPADM

## 2017-04-19 RX ORDER — METHYLPREDNISOLONE SODIUM SUCCINATE 125 MG/2ML
INJECTION, POWDER, LYOPHILIZED, FOR SOLUTION INTRAMUSCULAR; INTRAVENOUS AS NEEDED
Status: DISCONTINUED | OUTPATIENT
Start: 2017-04-19 | End: 2017-04-19 | Stop reason: HOSPADM

## 2017-04-19 RX ORDER — MULTIVITAMIN/IRON/FOLIC ACID 18MG-0.4MG
1 TABLET ORAL DAILY
COMMUNITY
End: 2020-03-17

## 2017-04-19 RX ORDER — LIDOCAINE HYDROCHLORIDE 20 MG/ML
INJECTION, SOLUTION INFILTRATION; PERINEURAL AS NEEDED
Status: DISCONTINUED | OUTPATIENT
Start: 2017-04-19 | End: 2017-04-19 | Stop reason: HOSPADM

## 2017-04-19 RX ORDER — DIAZEPAM 5 MG/1
5 TABLET ORAL 3 TIMES DAILY
Status: DISCONTINUED | OUTPATIENT
Start: 2017-04-19 | End: 2017-04-20 | Stop reason: HOSPADM

## 2017-04-19 RX ORDER — DEXTROSE MONOHYDRATE 25 G/50ML
25 INJECTION, SOLUTION INTRAVENOUS
Status: DISCONTINUED | OUTPATIENT
Start: 2017-04-19 | End: 2017-04-20 | Stop reason: HOSPADM

## 2017-04-19 RX ORDER — SODIUM CHLORIDE 0.9 % (FLUSH) 0.9 %
1-10 SYRINGE (ML) INJECTION AS NEEDED
Status: DISCONTINUED | OUTPATIENT
Start: 2017-04-19 | End: 2017-04-19 | Stop reason: HOSPADM

## 2017-04-19 RX ADMIN — SODIUM CHLORIDE 3 ML/KG/HR: 9 INJECTION, SOLUTION INTRAVENOUS at 14:05

## 2017-04-19 RX ADMIN — SODIUM CHLORIDE 100 ML/HR: 9 INJECTION, SOLUTION INTRAVENOUS at 17:57

## 2017-04-19 RX ADMIN — DOXEPIN HYDROCHLORIDE 100 MG: 100 CAPSULE ORAL at 22:17

## 2017-04-19 RX ADMIN — INSULIN LISPRO 7 UNITS: 100 INJECTION, SOLUTION INTRAVENOUS; SUBCUTANEOUS at 23:31

## 2017-04-19 RX ADMIN — DIAZEPAM 5 MG: 5 TABLET ORAL at 22:17

## 2017-04-19 RX ADMIN — LISINOPRIL 10 MG: 10 TABLET ORAL at 18:36

## 2017-04-19 RX ADMIN — MECLIZINE HYDROCHLORIDE 12.5 MG: 12.5 TABLET ORAL at 22:16

## 2017-04-19 RX ADMIN — METOPROLOL TARTRATE 12.5 MG: 25 TABLET, FILM COATED ORAL at 22:17

## 2017-04-19 RX ADMIN — BISACODYL 25 MG: 5 TABLET, COATED ORAL at 18:35

## 2017-04-19 NOTE — H&P
Referring Provider: Briana Bartlett MD     Reason for Visit: Shortness of breath, chest pain and bradycardia  Chest pain with exertion  No diaphoresis  No nausea  No radiation  Precipitated with exertion  Moderate dypnea  Compliant with medications     History of present illness: Barbara Tapia is a 57 y.o. yo female with history of HTN who presents today for        Chief Complaint   Patient presents with   • Chest Pain       1 mo f/u - results   .     History   Medical History         Past Medical History:   Diagnosis Date   • Asthma     • Chest pain     • Diabetes mellitus     • Hypertension     • Knee contracture       total   • Sleep apnea        ,    Surgical History    Past Surgical History:   Procedure Laterality Date   • CERVICAL FUSION         STITCHED UP TO PREVENT LOSING BABY   • CERVIX LESION DESTRUCTION       •  SECTION       • ELBOW PROCEDURE         screws placed x 2   • EXCISION MASS TRUNK         LEFT SIDE CAT SCRATCH FEVER    • PARTIAL HYMENECTOMY       • SINUS SURGERY          ,         Family History   Problem Relation Age of Onset   • Heart disease Mother     • Heart disease Father     ,        Social History   Substance Use Topics   • Smoking status: Never Smoker   • Smokeless tobacco: Never Used   • Alcohol use No   ,      Medications   Current Medications           Current Outpatient Prescriptions   Medication Sig Dispense Refill   • albuterol (PROVENTIL HFA;VENTOLIN HFA) 108 (90 BASE) MCG/ACT inhaler Every 6 (Six) Hours.       • bisacodyl (DULCOLAX) 5 MG EC tablet Daily.       • diazePAM (VALIUM) 5 MG tablet         • diclofenac (VOLTAREN) 75 MG EC tablet 75 mg 2 (Two) Times a Day.       • doxepin (SINEquan) 100 MG capsule         • Ergocalciferol (VITAMIN D2 PO) Take 1.25 mg by mouth 1 (One) Time Per Week. 1 time a week for 1 month       • escitalopram (LEXAPRO) 20 MG tablet Take 20 mg by mouth daily       • fexofenadine (ALLEGRA) 180 MG tablet Take 180 mg by mouth daily       •  glipiZIDE (GLUCOTROL) 10 MG tablet         • hydrOXYzine (VISTARIL) 50 MG capsule Take 50 mg by mouth 2 times daily       • ketoconazole (NIZORAL) 200 MG tablet Take 200 mg by mouth daily       • lisinopril (PRINIVIL,ZESTRIL) 10 MG tablet Take 10 mg by mouth 2 times daily       • losartan-hydrochlorothiazide (HYZAAR) 50-12.5 MG per tablet 2 (Two) Times a Day.       • meclizine (ANTIVERT) 12.5 MG tablet 3 (Three) Times a Day.       • MEGARED OMEGA-3 KRILL  MG capsule Take by mouth daily       • metoprolol tartrate (LOPRESSOR) 25 MG tablet Take 0.5 tablets by mouth 2 (Two) Times a Day. 45 tablet 3   • Multiple Vitamins-Minerals (MULTIVITAMIN ADULTS 50+) tablet Take by mouth daily       • nystatin (MYCOSTATIN) 771152 UNIT/ML suspension 4 (Four) Times a Day.       • ondansetron (ZOFRAN) 4 MG tablet Every 8 (Eight) Hours.       • probiotic (CULTURELLE) capsule capsule Take by mouth Daily.       • Sennosides (SENOKOT PO) Take by mouth.       • simvastatin (ZOCOR) 10 MG tablet         • tiZANidine (ZANAFLEX) 4 MG tablet         • traMADol (ULTRAM) 50 MG tablet         • venlafaxine XR (EFFEXOR-XR) 150 MG 24 hr capsule Take 150 mg by mouth 2 (Two) Times a Day.          No current facility-administered medications for this visit.             Allergies: Azelastine-fluticasone; Contrast dye; Iodides; Metformin and related; Oxycodone-aspirin; Pregabalin; Sulfa antibiotics; and Nisoldipine er     Review of Systems  Review of Systems   Constitution: Negative.   HENT: Negative.   Eyes: Negative.   Cardiovascular: Positive for dyspnea on exertion and palpitations. Negative for chest pain, claudication, cyanosis, irregular heartbeat, leg swelling, near-syncope, orthopnea, paroxysmal nocturnal dyspnea and syncope.   Respiratory: Negative.   Endocrine: Negative.   Hematologic/Lymphatic: Negative.   Skin: Negative.   Gastrointestinal: Negative for anorexia.   Genitourinary: Negative.   Neurological: Positive for dizziness.    Psychiatric/Behavioral: Negative.            Objective       Physical Exam:  Vitals see list  Physical Exam   Constitutional: She appears well-developed.   HENT:   Head: Normocephalic.   Neck: Normal carotid pulses and no JVD present. No tracheal tenderness present. Carotid bruit is not present. No tracheal deviation and no edema present.   Cardiovascular: Regular rhythm, normal heart sounds and normal pulses.   Pulmonary/Chest: Effort normal. No stridor.   Abdominal: Soft.   Neurological: She is alert. She has normal strength. No cranial nerve deficit or sensory deficit.   Skin: Skin is warm.   Psychiatric: She has a normal mood and affect. Her speech is normal and behavior is normal.         Results Review:      Procedures        Assessment/Plan            Patient Active Problem List   Diagnosis   • Palpitation   • Morbid obesity due to excess calories   • BLADIMIR treated with BiPAP   • Chest pain   • Essential hypertension   • Type 2 diabetes mellitus without complication, without long-term current use of insulin   • Mixed hyperlipidemia         Intermittent palpitations. No significant pedal edema. Compliant with medications and diet. Latest labs and medications reviewed.     Plan:     As recurrent chest pain with exertion and moderate shortness  No C/I to [facility]  Pretreated for Iodine allergy

## 2017-04-19 NOTE — PLAN OF CARE
Problem: Patient Care Overview (Adult)  Goal: Plan of Care Review  Outcome: Ongoing (interventions implemented as appropriate)    04/19/17 1743   Coping/Psychosocial Response Interventions   Plan Of Care Reviewed With patient   Patient Care Overview   Progress no change   Outcome Evaluation   Outcome Summary/Follow up Plan r radial site cdi, c/o generlized chronic pain, continue to monitor       Goal: Adult Individualization and Mutuality  Outcome: Ongoing (interventions implemented as appropriate)  Goal: Discharge Needs Assessment  Outcome: Ongoing (interventions implemented as appropriate)    Problem: Cardiac Catheterization with/without PCI (Adult)  Goal: Signs and Symptoms of Listed Potential Problems Will be Absent or Manageable (Cardiac Catheterization with/without PCI)  Outcome: Ongoing (interventions implemented as appropriate)    Problem: Pain, Chronic (Adult)  Goal: Identify Related Risk Factors and Signs and Symptoms  Outcome: Outcome(s) achieved Date Met:  04/19/17

## 2017-04-20 VITALS
BODY MASS INDEX: 45.99 KG/M2 | HEIGHT: 67 IN | OXYGEN SATURATION: 94 % | SYSTOLIC BLOOD PRESSURE: 143 MMHG | HEART RATE: 60 BPM | DIASTOLIC BLOOD PRESSURE: 50 MMHG | TEMPERATURE: 98.4 F | WEIGHT: 293 LBS | RESPIRATION RATE: 18 BRPM

## 2017-04-20 LAB
ANION GAP SERPL CALCULATED.3IONS-SCNC: 15 MMOL/L (ref 4–13)
BUN BLD-MCNC: 27 MG/DL (ref 5–21)
BUN/CREAT SERPL: 20.8 (ref 7–25)
CALCIUM SPEC-SCNC: 9.3 MG/DL (ref 8.4–10.4)
CHLORIDE SERPL-SCNC: 99 MMOL/L (ref 98–110)
CO2 SERPL-SCNC: 21 MMOL/L (ref 24–31)
CREAT BLD-MCNC: 1.3 MG/DL (ref 0.5–1.4)
DEPRECATED RDW RBC AUTO: 42.8 FL (ref 40–54)
ERYTHROCYTE [DISTWIDTH] IN BLOOD BY AUTOMATED COUNT: 13 % (ref 12–15)
GFR SERPL CREATININE-BSD FRML MDRD: 42 ML/MIN/1.73
GLUCOSE BLD-MCNC: 228 MG/DL (ref 70–100)
GLUCOSE BLDC GLUCOMTR-MCNC: 233 MG/DL (ref 70–130)
GLUCOSE BLDC GLUCOMTR-MCNC: 287 MG/DL (ref 70–130)
HCT VFR BLD AUTO: 35.6 % (ref 37–47)
HGB BLD-MCNC: 11.8 G/DL (ref 12–16)
MCH RBC QN AUTO: 29.9 PG (ref 28–32)
MCHC RBC AUTO-ENTMCNC: 33.1 G/DL (ref 33–36)
MCV RBC AUTO: 90.1 FL (ref 82–98)
PLATELET # BLD AUTO: 322 10*3/MM3 (ref 130–400)
PMV BLD AUTO: 10.5 FL (ref 6–12)
POTASSIUM BLD-SCNC: 4.5 MMOL/L (ref 3.5–5.3)
RBC # BLD AUTO: 3.95 10*6/MM3 (ref 4.2–5.4)
SODIUM BLD-SCNC: 135 MMOL/L (ref 135–145)
WBC NRBC COR # BLD: 15.21 10*3/MM3 (ref 4.8–10.8)

## 2017-04-20 PROCEDURE — G0378 HOSPITAL OBSERVATION PER HR: HCPCS

## 2017-04-20 PROCEDURE — 80048 BASIC METABOLIC PNL TOTAL CA: CPT | Performed by: INTERNAL MEDICINE

## 2017-04-20 PROCEDURE — 85027 COMPLETE CBC AUTOMATED: CPT | Performed by: INTERNAL MEDICINE

## 2017-04-20 PROCEDURE — 99217 PR OBSERVATION CARE DISCHARGE MANAGEMENT: CPT | Performed by: PHYSICIAN ASSISTANT

## 2017-04-20 PROCEDURE — 94799 UNLISTED PULMONARY SVC/PX: CPT

## 2017-04-20 PROCEDURE — 63710000001 INSULIN LISPRO (HUMAN) PER 5 UNITS: Performed by: INTERNAL MEDICINE

## 2017-04-20 PROCEDURE — 82962 GLUCOSE BLOOD TEST: CPT

## 2017-04-20 RX ORDER — ASPIRIN 81 MG/1
81 TABLET, CHEWABLE ORAL DAILY
Qty: 30 TABLET | Refills: 0 | Status: SHIPPED | OUTPATIENT
Start: 2017-04-20 | End: 2017-10-16 | Stop reason: SINTOL

## 2017-04-20 RX ORDER — ASPIRIN 81 MG/1
81 TABLET, CHEWABLE ORAL DAILY
Status: DISCONTINUED | OUTPATIENT
Start: 2017-04-20 | End: 2017-04-20 | Stop reason: HOSPADM

## 2017-04-20 RX ORDER — ACETAMINOPHEN 325 MG/1
650 TABLET ORAL EVERY 6 HOURS PRN
Status: DISCONTINUED | OUTPATIENT
Start: 2017-04-20 | End: 2017-04-20 | Stop reason: HOSPADM

## 2017-04-20 RX ADMIN — MECLIZINE HYDROCHLORIDE 12.5 MG: 12.5 TABLET ORAL at 08:37

## 2017-04-20 RX ADMIN — ACETAMINOPHEN 650 MG: 325 TABLET ORAL at 13:03

## 2017-04-20 RX ADMIN — ASPIRIN 81 MG 81 MG: 81 TABLET ORAL at 13:03

## 2017-04-20 RX ADMIN — INSULIN LISPRO 6 UNITS: 100 INJECTION, SOLUTION INTRAVENOUS; SUBCUTANEOUS at 12:34

## 2017-04-20 RX ADMIN — DIAZEPAM 5 MG: 5 TABLET ORAL at 08:37

## 2017-04-20 RX ADMIN — METOPROLOL TARTRATE 12.5 MG: 25 TABLET, FILM COATED ORAL at 08:38

## 2017-04-20 RX ADMIN — LISINOPRIL 10 MG: 10 TABLET ORAL at 08:37

## 2017-04-20 RX ADMIN — SODIUM CHLORIDE 100 ML/HR: 9 INJECTION, SOLUTION INTRAVENOUS at 06:05

## 2017-04-20 RX ADMIN — INSULIN LISPRO 4 UNITS: 100 INJECTION, SOLUTION INTRAVENOUS; SUBCUTANEOUS at 08:38

## 2017-04-20 NOTE — CONSULTS
"Nephrology ([facility]) Consult Note      Patient:  Barbara Tapia  YOB: 1960  Date of Service: 4/20/2017  MRN: [id number]   Acct: [id number]   Primary Care Physician: Briana Bartlett MD  Advance Directive: Full Code  Admit Date: 4/19/2017       Hospital Day: 0  Referring Provider: Shane Singh MD      Patient personally seen and examined.  Complete chart including Consults, Notes, Operative Reports, Labs, Cardiology, and Radiology studies reviewed as able.        Subjective:  Barbara Tapia is a 57 y.o. female  whom we were consulted for elevated creatinine.  She underwent a cardiac catheterization yesterday.  Diminished renal function was noted as part of her workup for the heart cath.  Patient has never seen a nephrologist in the past but has been told by her PCP her kidney function was \"borderline\".  History of diabetes type 2 and hypertension.  Uses NSAIDs daily.  Has experienced decreased urine output several times in the recent past, denies hematuria or dysuria.  Currently is resting comfortably, no pain/discomfort.    Allergies:  Azelastine-fluticasone; Contrast dye; Iodides; Metformin and related; Oxycodone-aspirin; Pregabalin; Sulfa antibiotics; and Nisoldipine er    Home Meds:  Prescriptions Prior to Admission   Medication Sig Dispense Refill Last Dose   • albuterol (PROVENTIL HFA;VENTOLIN HFA) 108 (90 BASE) MCG/ACT inhaler 2 puffs 4 (Four) Times a Day.   4/19/2017 at 0800   • diazePAM (VALIUM) 5 MG tablet Take 5 mg by mouth 3 (Three) Times a Day.   4/19/2017 at 0800   • diclofenac (VOLTAREN) 75 MG EC tablet 75 mg 2 (Two) Times a Day.   4/19/2017 at 0800   • doxepin (SINEquan) 100 MG capsule Take 100 mg by mouth Every Night.   4/18/2017 at Unknown time   • escitalopram (LEXAPRO) 20 MG tablet Take 20 mg by mouth daily   4/19/2017 at 0800   • fexofenadine (ALLEGRA) 180 MG tablet Take 180 mg by mouth daily   4/19/2017 at 0800   • glipiZIDE (GLUCOTROL) 10 MG tablet Take " 10 mg by mouth 3 (Three) Times a Day.   4/18/2017 at Unknown time   • hydrOXYzine (VISTARIL) 50 MG capsule Take 50 mg by mouth 2 times daily   4/19/2017 at 0800   • ketoconazole (NIZORAL) 200 MG tablet Take 200 mg by mouth daily   4/19/2017 at 0800   • lisinopril (PRINIVIL,ZESTRIL) 10 MG tablet Take 10 mg by mouth 2 times daily   Past Week at Unknown time   • losartan-hydrochlorothiazide (HYZAAR) 50-12.5 MG per tablet 1 tablet 2 (Two) Times a Day.   4/19/2017 at 0800   • meclizine (ANTIVERT) 12.5 MG tablet Take 12.5 mg by mouth 3 (Three) Times a Day.   4/19/2017 at 0800   • MEGARED OMEGA-3 KRILL  MG capsule Take by mouth daily 1 capsule   4/19/2017 at 0800   • metoprolol tartrate (LOPRESSOR) 25 MG tablet Take 0.5 tablets by mouth 2 (Two) Times a Day. (Patient taking differently: Take 25 mg by mouth 2 (Two) Times a Day.) 45 tablet 3 4/19/2017 at 0800   • Multiple Vitamins-Minerals (CENTRUM ULTRA WOMENS) tablet Take 1 capsule by mouth Daily.   4/19/2017 at 0800   • probiotic (CULTURELLE) capsule capsule Take 1 capsule by mouth Daily.   4/19/2017 at 0800   • simvastatin (ZOCOR) 10 MG tablet Take 10 mg by mouth Every Night.   4/18/2017 at Unknown time   • tiZANidine (ZANAFLEX) 4 MG tablet 4 mg Every 8 (Eight) Hours.   4/19/2017 at 0800   • traMADol (ULTRAM) 50 MG tablet 50 mg 4 (Four) Times a Day.   4/19/2017 at 0800   • venlafaxine XR (EFFEXOR-XR) 150 MG 24 hr capsule Take 300 mg by mouth Daily.   4/19/2017 at 0800   • bisacodyl (DULCOLAX) 5 MG EC tablet Take 25 mg by mouth Every Other Day.   4/17/2017   • Ergocalciferol (VITAMIN D2 PO) Take 1.25 mg by mouth 1 (One) Time Per Week. 1 time a week for 1 month   4/17/2017   • nystatin (MYCOSTATIN) 902926 UNIT/ML suspension Take 500,000 Units by mouth As Needed.   More than a month at Unknown time   • Sennosides (SENOKOT PO) Take 4 tablets by mouth Every Other Day.   4/17/2017       Medicines:  Current Facility-Administered Medications   Medication Dose Route  Frequency Provider Last Rate Last Dose   • bisacodyl (DULCOLAX) EC tablet 25 mg  25 mg Oral Every Other Day Shane Singh MD   25 mg at 04/19/17 1835   • dextrose (D50W) solution 25 g  25 g Intravenous Q15 Min PRN Shane Singh MD       • dextrose (GLUTOSE) oral gel 15 g  15 g Oral Q15 Min PRN Shane Singh MD       • diazePAM (VALIUM) tablet 5 mg  5 mg Oral TID Shane Singh MD   5 mg at 04/20/17 0837   • doxepin (SINEquan) capsule 100 mg  100 mg Oral Nightly Shane Singh MD   100 mg at 04/19/17 2217   • glucagon (human recombinant) (GLUCAGEN DIAGNOSTIC) injection 1 mg  1 mg Subcutaneous Q15 Min PRN Shane Singh MD       • insulin lispro (humaLOG) injection 0-9 Units  0-9 Units Subcutaneous 4x Daily With Meals & Nightly Shane Singh MD   4 Units at 04/20/17 0838   • lisinopril (PRINIVIL,ZESTRIL) tablet 10 mg  10 mg Oral Q24H Shane Singh MD   10 mg at 04/20/17 0837   • meclizine (ANTIVERT) tablet 12.5 mg  12.5 mg Oral TID Shane Singh MD   12.5 mg at 04/20/17 0837   • metoprolol tartrate (LOPRESSOR) tablet 12.5 mg  12.5 mg Oral Q12H Shane Singh MD   12.5 mg at 04/20/17 0838   • sodium chloride 0.9 % infusion  100 mL/hr Intravenous Continuous Shane Singh  mL/hr at 04/20/17 0843 100 mL/hr at 04/20/17 0843       Past Medical History:  Past Medical History:   Diagnosis Date   • Arthritis    • Asthma    • Chest pain    • Diabetes mellitus    • Fibromyalgia    • Hypertension    • Knee contracture     total   • Sleep apnea        Past Surgical History:  Past Surgical History:   Procedure Laterality Date   • CARDIAC CATHETERIZATION Left 4/19/2017    Procedure: Cardiac Catheterization/Vascular Study;  Surgeon: Shane Singh MD;  Location: [facility] CATH INVASIVE LOC[facility];  Service:    • CARDIAC CATHETERIZATION N/A 4/19/2017    Procedure: Coronary angiography;  Surgeon: Shane Singh MD;  Location:  [facility] CATH INVASIVE LOCATION;  Service:    • CARDIAC CATHETERIZATION N/A 4/19/2017    Procedure: Left Heart Cath;  Surgeon:  "Shane Singh MD;  Location: [facility] CATH INVASIVE LOCATION;  Service:    • CERVICAL FUSION      STITCHED UP TO PREVENT LOSING BABY   • CERVIX LESION DESTRUCTION     •  SECTION     • ELBOW PROCEDURE      screws placed x 2   • EXCISION MASS TRUNK      LEFT SIDE CAT SCRATCH FEVER    • PARTIAL HYMENECTOMY     • REPLACEMENT TOTAL KNEE BILATERAL Bilateral    • SINUS SURGERY         Family History  Family History   Problem Relation Age of Onset   • Heart disease Mother    • Heart disease Father        Social History  Social History     Social History   • Marital status:      Spouse name: N/A   • Number of children: N/A   • Years of education: N/A     Occupational History   • Not on file.     Social History Main Topics   • Smoking status: Never Smoker   • Smokeless tobacco: Never Used   • Alcohol use No   • Drug use: No   • Sexual activity: Defer     Other Topics Concern   • Not on file     Social History Narrative         Review of Systems:  History obtained from chart review and the patient  General ROS: No fever or chills  Respiratory ROS: No cough, shortness of breath, wheezing  Cardiovascular ROS: no chest pain or dyspnea on exertion  Gastrointestinal ROS: No abdominal pain or melena  Genito-Urinary ROS: No dysuria or hematuria  14 point ROS reviewed with the patient and negative except as noted above and in the HPI unless unable to obtain.    Objective:  /50 (BP Location: Left arm, Patient Position: Lying)  Pulse 60  Temp 98.4 °F (36.9 °C) (Temporal Artery )   Resp 18  Ht 67\" (170.2 cm)  Wt 295 lb (134 kg)  LMP  (LMP Unknown)  SpO2 94%  BMI 46.2 kg/m2    Intake/Output Summary (Last 24 hours) at 17 1046  Last data filed at 17 0605   Gross per 24 hour   Intake          2273.34 ml   Output                0 ml   Net          2273.34 ml     General: awake/alert   Chest:  clear to auscultation bilaterally without respiratory distress  CVS: regular rate and rhythm  Abdominal: soft, " nontender, normal bowel sounds  Extremities: no cyanosis or edema  Skin: warm and dry without rash      Labs:    Results from last 7 days  Lab Units 04/20/17  0546 04/14/17  1046   WBC 10*3/mm3 15.21* 7.08   HEMOGLOBIN g/dL 11.8* 12.1   HEMATOCRIT % 35.6* 36.0*   PLATELETS 10*3/mm3 322 308           Results from last 7 days  Lab Units 04/20/17  0547 04/14/17  1046   SODIUM mmol/L 135 139   POTASSIUM mmol/L 4.5 4.7   CHLORIDE mmol/L 99 100   TOTAL CO2 mmol/L 21.0* 25.0   BUN mg/dL 27* 36*   CREATININE mg/dL 1.30 2.23*   CALCIUM mg/dL 9.3 9.6   BILIRUBIN mg/dL  --  0.5   ALK PHOS U/L  --  87   ALT (SGPT) U/L  --  64*   AST (SGOT) U/L  --  51*   GLUCOSE mg/dL 228* 119*       Radiology:   Imaging Results (last 72 hours)     ** No results found for the last 72 hours. **          Culture:         Assessment   1.  Likely chronic kidney disease; unknown baseline level  2.  Diabetes type 2  3.  Essential hypertension  4.  Chest pain    Plan:  1.  Continue normal saline intravenous fluid   2.  Continue to follow renal function closely  3.  Further assessment and plan pending Dr Carlin's evaluation of patient      Thank you for the consult, we appreciate the opportunity to provide care to your patients.  Feel free to contact me if I can be of any further assistance.      Jim Spicer, ELTON  4/20/2017  10:46 AM

## 2017-04-20 NOTE — DISCHARGE INSTRUCTIONS
Activity: no driving x 2 days, no tub baths x 5 days, no heavy lifting > 5 lbs or repetitive bending/lifting/pulling x 2 weeks  Diet: Cardiac/low salt diet  Medications: Take all medications as prescribed  Follow-up: Follow-up with primary care provider in 1 week regarding noncardiac chest pain. Follow-up with Dr. Carlin in 2 weeks. Keep follow-up with Dr. Singh as scheduled.   Other: Check wrist site for signs of infection, including drainage, redness, tenderness, swelling; call if any occur

## 2017-04-20 NOTE — DISCHARGE SUMMARY
"[facility] HEART GROUP DISCHARGE    Date of Discharge:  4/20/2017    Discharge Diagnosis: chest pain, nonobstructive coronary artery disease     Presenting Problem/History of Present Illness  Chest pain, unspecified type [R07.9]  Chest pain, unspecified type [R07.9]      Hospital Course  Patient is a 57 y.o. female with history of morbid obesity, diabetes mellitus type 2, hypertension, obstructive sleep apnea who presented to [facility] for elective cardiac cath, following appointment with Dr. Singh on 4/14/17. The patient was noted at this time with increased exertional chest pain and dyspnea. The patient had had a negative stress echo on 4/6/17. As her symptoms persisted, she was taken for cardiac cath. The patient was noted with 40-50% mid left anterior descending artery stenosis. Otherwise, arteries were noted without disease. She tolerated the procedure well and no obvious complications were noted. The patient continues to have mild, intermittent, substernal chest pain during her admission. She denies any dyspnea, increased leg swelling or related. She states she feels well today and is ready for discharge. Of note, nephrology was consulted due to initial Cr noted at 2.23 on 4/14/17. Today, her Cr is much improved, 1.30. She tells me she has a history of \"borderline kidney disease\". The patient has received IV fluids and is to follow-up with nephrology in 2 weeks.    Procedures Performed  Procedure(s):  Cardiac Catheterization/Vascular Study  Coronary angiography  Left Heart Cath       Consults:   Consults     Date and Time Order Name Status Description    4/19/2017 1731 Inpatient Consult to Nephrology Completed           Echo EF Estimated  Lab Results   Component Value Date    ECHOEFEST 60 04/06/2017       Nuclear Stress Ejection Fraction  No components found for: NUCEF    Cath Ejection Fraction Quantitative  No results found for: CATHEF    Condition on Discharge:  Stable, no acute distress    Physical " Exam at Discharge    Const: aaox3, no acute distress, obese  Heart: NRR, no m,g,r  Chest: Lungs CTAB  Abd: bsx4, nd,nt  Ext: trace edema BL, pulses +2  Skin: wrist site healing well, with no signs of infection/hematoma    Vital Signs  Temp:  [96.9 °F (36.1 °C)-98.7 °F (37.1 °C)] 98.4 °F (36.9 °C)  Heart Rate:  [57-67] 60  Resp:  [13-20] 18  BP: ()/(45-83) 143/50    Discharge Disposition  Home or Self CareHome    Discharge Medications   Barbara Tapia   Home Medication Instructions ZOLTAN:[id number]    Printed on:04/20/17 1974   Medication Information                      albuterol (PROVENTIL HFA;VENTOLIN HFA) 108 (90 BASE) MCG/ACT inhaler  2 puffs 4 (Four) Times a Day.             aspirin 81 MG chewable tablet  Chew 1 tablet Daily.             bisacodyl (DULCOLAX) 5 MG EC tablet  Take 25 mg by mouth Every Other Day.             diazePAM (VALIUM) 5 MG tablet  Take 5 mg by mouth 3 (Three) Times a Day.             doxepin (SINEquan) 100 MG capsule  Take 100 mg by mouth Every Night.             Ergocalciferol (VITAMIN D2 PO)  Take 1.25 mg by mouth 1 (One) Time Per Week. 1 time a week for 1 month             escitalopram (LEXAPRO) 20 MG tablet  Take 20 mg by mouth daily             fexofenadine (ALLEGRA) 180 MG tablet  Take 180 mg by mouth daily             glipiZIDE (GLUCOTROL) 10 MG tablet  Take 10 mg by mouth 3 (Three) Times a Day.             hydrOXYzine (VISTARIL) 50 MG capsule  Take 50 mg by mouth 2 times daily             ketoconazole (NIZORAL) 200 MG tablet  Take 200 mg by mouth daily             lisinopril (PRINIVIL,ZESTRIL) 10 MG tablet  Take 10 mg by mouth 2 times daily             losartan-hydrochlorothiazide (HYZAAR) 50-12.5 MG per tablet  1 tablet 2 (Two) Times a Day.             meclizine (ANTIVERT) 12.5 MG tablet  Take 12.5 mg by mouth 3 (Three) Times a Day.             MEGARED OMEGA-3 KRILL  MG capsule  Take by mouth daily 1 capsule             metoprolol tartrate (LOPRESSOR) 25 MG  tablet  Take 0.5 tablets by mouth 2 (Two) Times a Day.             Multiple Vitamins-Minerals (CENTRUM ULTRA WOMENS) tablet  Take 1 capsule by mouth Daily.             nystatin (MYCOSTATIN) 751118 UNIT/ML suspension  Take 500,000 Units by mouth As Needed.             probiotic (CULTURELLE) capsule capsule  Take 1 capsule by mouth Daily.             Sennosides (SENOKOT PO)  Take 4 tablets by mouth Every Other Day.             simvastatin (ZOCOR) 10 MG tablet  Take 10 mg by mouth Every Night.             tiZANidine (ZANAFLEX) 4 MG tablet  4 mg Every 8 (Eight) Hours.             traMADol (ULTRAM) 50 MG tablet  50 mg 4 (Four) Times a Day.             venlafaxine XR (EFFEXOR-XR) 150 MG 24 hr capsule  Take 300 mg by mouth Daily.                 Discharge Diet: as below    Activity at Discharge: as below    Follow-up Appointments  Future Appointments  Date Time Provider Department Center   7/14/2017 9:30 AM Shane Singh MD [facility] None         Test Results Pending at Discharge  as below    Discharge Instructions  Activity: no driving x 2 days, no tub baths x 5 days, no heavy lifting > 5 lbs or repetitive bending/lifting/pulling x 2 weeks  Diet: Cardiac/low salt diet  Medications: Take all medications as prescribed  Follow-up: Follow-up with primary care provider in 1 week regarding noncardiac chest pain. Follow-up with Dr. Carlin in 2 weeks. Keep follow-up with Dr. Singh as scheduled.   Other: Check wrist site for signs of infection, including drainage, redness, tenderness, swelling; call if any occur      Maxine De La Fuente PA-C  04/20/17  2:35 PM

## 2017-04-20 NOTE — PLAN OF CARE
Problem: Patient Care Overview (Adult)  Goal: Plan of Care Review  Outcome: Ongoing (interventions implemented as appropriate)    04/20/17 0354   Coping/Psychosocial Response Interventions   Plan Of Care Reviewed With patient   Patient Care Overview   Progress no change   Outcome Evaluation   Outcome Summary/Follow up Plan no c/o pain pt did c/o of some mild pressure in upper abdomin & chest after coughing, TR band removed from right wrist and band aid applied pulses palpable. BP elevated, Blood sugar elevated  7 units of Humalog given per order for blood sugar of 318 moderate sliding scale insulin ordered. Sinus kaitlynn/sinus 49-67 on telemetry.        Goal: Adult Individualization and Mutuality  Outcome: Ongoing (interventions implemented as appropriate)    Problem: Cardiac Catheterization with/without PCI (Adult)  Goal: Signs and Symptoms of Listed Potential Problems Will be Absent or Manageable (Cardiac Catheterization with/without PCI)  Outcome: Ongoing (interventions implemented as appropriate)    Problem: Pain, Chronic (Adult)  Goal: Acceptable Pain Control/Comfort Level  Outcome: Ongoing (interventions implemented as appropriate)

## 2017-04-20 NOTE — PROGRESS NOTES
Discharge Planning Assessment  [facility]     Patient Name: Barbara Tapia  MRN: [id number]  Today's Date: 4/20/2017    Admit Date: 4/19/2017          Discharge Needs Assessment       04/20/17 0953    Living Environment    Lives With (P)  spouse    Living Arrangements (P)  house    Able to Return to Prior Living Arrangements (P)  yes    Discharge Needs Assessment    Concerns To Be Addressed (P)  no discharge needs identified    Readmission Within The Last 30 Days (P)  no previous admission in last 30 days    Anticipated Changes Related to Illness (P)  none    Equipment Currently Used at Home (P)  none    Equipment Needed After Discharge (P)  none    Transportation Available (P)  car;family or friend will provide    Discharge Disposition (P)  home or self-care            Discharge Plan       04/20/17 0954    Case Management/Social Work Plan    Plan (P)  Pt plans to dc home with spouse. Pt has no known dc needs at this time. Sw will continue to follow.     Patient/Family In Agreement With Plan (P)  yes        Discharge Placement     No information found                Demographic Summary     None            Functional Status     None            Psychosocial     None            Abuse/Neglect     None            Legal     None            Substance Abuse     None            Patient Forms     None          Omayra Ladd

## 2017-07-14 ENCOUNTER — OFFICE VISIT (OUTPATIENT)
Dept: CARDIOLOGY | Facility: CLINIC | Age: 57
End: 2017-07-14

## 2017-07-14 ENCOUNTER — LAB (OUTPATIENT)
Dept: LAB | Facility: HOSPITAL | Age: 57
End: 2017-07-14
Attending: INTERNAL MEDICINE

## 2017-07-14 VITALS
BODY MASS INDEX: 44.07 KG/M2 | SYSTOLIC BLOOD PRESSURE: 142 MMHG | HEART RATE: 65 BPM | WEIGHT: 280.8 LBS | HEIGHT: 67 IN | OXYGEN SATURATION: 97 % | DIASTOLIC BLOOD PRESSURE: 92 MMHG

## 2017-07-14 DIAGNOSIS — E66.01 MORBID OBESITY DUE TO EXCESS CALORIES (HCC): ICD-10-CM

## 2017-07-14 DIAGNOSIS — G47.33 OSA TREATED WITH BIPAP: ICD-10-CM

## 2017-07-14 DIAGNOSIS — R00.2 PALPITATION: Primary | ICD-10-CM

## 2017-07-14 DIAGNOSIS — I10 ESSENTIAL HYPERTENSION: ICD-10-CM

## 2017-07-14 DIAGNOSIS — E78.2 MIXED HYPERLIPIDEMIA: ICD-10-CM

## 2017-07-14 DIAGNOSIS — E11.9 TYPE 2 DIABETES MELLITUS WITHOUT COMPLICATION, WITHOUT LONG-TERM CURRENT USE OF INSULIN (HCC): ICD-10-CM

## 2017-07-14 LAB
ALBUMIN SERPL-MCNC: 4.4 G/DL (ref 3.5–5)
ALBUMIN/GLOB SERPL: 1.5 G/DL (ref 1.1–2.5)
ALP SERPL-CCNC: 88 U/L (ref 24–120)
ALT SERPL W P-5'-P-CCNC: 75 U/L (ref 0–54)
ANION GAP SERPL CALCULATED.3IONS-SCNC: 11 MMOL/L (ref 4–13)
AST SERPL-CCNC: 64 U/L (ref 7–45)
BASOPHILS # BLD AUTO: 0.02 10*3/MM3 (ref 0–0.2)
BASOPHILS NFR BLD AUTO: 0.2 % (ref 0–2)
BILIRUB SERPL-MCNC: 0.3 MG/DL (ref 0.1–1)
BUN BLD-MCNC: 12 MG/DL (ref 5–21)
BUN/CREAT SERPL: 11.5 (ref 7–25)
CALCIUM SPEC-SCNC: 9.5 MG/DL (ref 8.4–10.4)
CHLORIDE SERPL-SCNC: 94 MMOL/L (ref 98–110)
CO2 SERPL-SCNC: 28 MMOL/L (ref 24–31)
CREAT BLD-MCNC: 1.04 MG/DL (ref 0.5–1.4)
DEPRECATED RDW RBC AUTO: 41.4 FL (ref 40–54)
EOSINOPHIL # BLD AUTO: 0 10*3/MM3 (ref 0–0.7)
EOSINOPHIL NFR BLD AUTO: 0 % (ref 0–4)
ERYTHROCYTE [DISTWIDTH] IN BLOOD BY AUTOMATED COUNT: 13 % (ref 12–15)
GFR SERPL CREATININE-BSD FRML MDRD: 54 ML/MIN/1.73
GLOBULIN UR ELPH-MCNC: 3 GM/DL
GLUCOSE BLD-MCNC: 97 MG/DL (ref 70–100)
HCT VFR BLD AUTO: 36.9 % (ref 37–47)
HGB BLD-MCNC: 12.3 G/DL (ref 12–16)
IMM GRANULOCYTES # BLD: 0.05 10*3/MM3 (ref 0–0.03)
IMM GRANULOCYTES NFR BLD: 0.5 % (ref 0–5)
LYMPHOCYTES # BLD AUTO: 2.82 10*3/MM3 (ref 0.72–4.86)
LYMPHOCYTES NFR BLD AUTO: 29.9 % (ref 15–45)
MCH RBC QN AUTO: 29.7 PG (ref 28–32)
MCHC RBC AUTO-ENTMCNC: 33.3 G/DL (ref 33–36)
MCV RBC AUTO: 89.1 FL (ref 82–98)
MONOCYTES # BLD AUTO: 0.65 10*3/MM3 (ref 0.19–1.3)
MONOCYTES NFR BLD AUTO: 6.9 % (ref 4–12)
NEUTROPHILS # BLD AUTO: 5.9 10*3/MM3 (ref 1.87–8.4)
NEUTROPHILS NFR BLD AUTO: 62.5 % (ref 39–78)
PLATELET # BLD AUTO: 397 10*3/MM3 (ref 130–400)
PMV BLD AUTO: 9.6 FL (ref 6–12)
POTASSIUM BLD-SCNC: 4.3 MMOL/L (ref 3.5–5.3)
PROT SERPL-MCNC: 7.4 G/DL (ref 6.3–8.7)
RBC # BLD AUTO: 4.14 10*6/MM3 (ref 4.2–5.4)
SODIUM BLD-SCNC: 133 MMOL/L (ref 135–145)
WBC NRBC COR # BLD: 9.44 10*3/MM3 (ref 4.8–10.8)

## 2017-07-14 PROCEDURE — 36415 COLL VENOUS BLD VENIPUNCTURE: CPT

## 2017-07-14 PROCEDURE — 99214 OFFICE O/P EST MOD 30 MIN: CPT | Performed by: INTERNAL MEDICINE

## 2017-07-14 PROCEDURE — 93000 ELECTROCARDIOGRAM COMPLETE: CPT | Performed by: INTERNAL MEDICINE

## 2017-07-14 PROCEDURE — 85025 COMPLETE CBC W/AUTO DIFF WBC: CPT | Performed by: INTERNAL MEDICINE

## 2017-07-14 PROCEDURE — 80053 COMPREHEN METABOLIC PANEL: CPT | Performed by: INTERNAL MEDICINE

## 2017-07-14 RX ORDER — CARBOXYMETHYLCELLULOSE SODIUM 5 MG/ML
SOLUTION/ DROPS OPHTHALMIC 3 TIMES DAILY PRN
COMMUNITY
End: 2021-05-17

## 2017-07-14 NOTE — PROGRESS NOTES
Barbara Tapia  [id number]  1960  57 y.o.  female    Referring Provider: Briana Bartlett MD    Reason for  Visit: Shortness of breath, chest pain and bradycardia  Chest pain with exertion  No diaphoresis  No nausea  No radiation  Precipitated with exertion  Moderate dypnea  Compliant with medications  Recent Cardiac cath non obstructive coronary artery disease Normal LVEF    History of present illness:  Barbara Tapia is a 57 y.o. yo female with history of HTN who presents today for   Chief Complaint   Patient presents with   • Chest Pain     3 month f/u    .    History  Past Medical History:   Diagnosis Date   • Arthritis    • Asthma    • Chest pain    • Diabetes mellitus    • Fibromyalgia    • Hypertension    • Knee contracture     total   • Sleep apnea    ,   Past Surgical History:   Procedure Laterality Date   • CARDIAC CATHETERIZATION Left 2017    Procedure: Cardiac Catheterization/Vascular Study;  Surgeon: Shane Singh MD;  Location:  [facility];  Service:    • CARDIAC CATHETERIZATION N/A 2017    Procedure: Coronary angiography;  Surgeon: Shane Singh MD;  Location:  [facility];  Service:    • CARDIAC CATHETERIZATION N/A 2017    Procedure: Left Heart Cath;  Surgeon: Shane Singh MD;  Location:  [facility];  Service:    • CERVICAL FUSION      STITCHED UP TO PREVENT LOSING BABY   • CERVIX LESION DESTRUCTION     •  SECTION     • ELBOW PROCEDURE      screws placed x 2   • EXCISION MASS TRUNK      LEFT SIDE CAT SCRATCH FEVER    • PARTIAL HYMENECTOMY     • REPLACEMENT TOTAL KNEE BILATERAL Bilateral    • SINUS SURGERY     ,   Family History   Problem Relation Age of Onset   • Heart disease Mother    • Heart disease Father    ,   Social History   Substance Use Topics   • Smoking status: Never Smoker   • Smokeless tobacco: Never Used   • Alcohol use No   ,     Medications  Current Outpatient Prescriptions   Medication Sig Dispense Refill    • albuterol (PROVENTIL HFA;VENTOLIN HFA) 108 (90 BASE) MCG/ACT inhaler 2 puffs 4 (Four) Times a Day.     • aspirin 81 MG chewable tablet Chew 1 tablet Daily. 30 tablet 0   • bisacodyl (DULCOLAX) 5 MG EC tablet Take 25 mg by mouth Every Other Day.     • carboxymethylcellulose (REFRESH PLUS) 0.5 % solution 3 (Three) Times a Day As Needed for Dry Eyes.     • diazePAM (VALIUM) 5 MG tablet Take 5 mg by mouth 3 (Three) Times a Day.     • doxepin (SINEquan) 100 MG capsule Take 100 mg by mouth Every Night.     • Ergocalciferol (VITAMIN D2 PO) Take 1.25 mg by mouth 1 (One) Time Per Week. 1 time a week for 1 month     • escitalopram (LEXAPRO) 20 MG tablet Take 20 mg by mouth daily     • fexofenadine (ALLEGRA) 180 MG tablet Take 180 mg by mouth daily     • glipiZIDE (GLUCOTROL) 10 MG tablet Take 10 mg by mouth 3 (Three) Times a Day.     • hydrOXYzine (VISTARIL) 50 MG capsule Take 50 mg by mouth 2 times daily     • ketoconazole (NIZORAL) 200 MG tablet Take 200 mg by mouth daily     • losartan-hydrochlorothiazide (HYZAAR) 50-12.5 MG per tablet 1 tablet 2 (Two) Times a Day.     • meclizine (ANTIVERT) 12.5 MG tablet Take 12.5 mg by mouth 3 (Three) Times a Day.     • MEGARED OMEGA-3 KRILL  MG capsule Take by mouth daily 1 capsule     • metoprolol tartrate (LOPRESSOR) 25 MG tablet Take 2 tablets by mouth 2 (Two) Times a Day. 180 tablet 3   • Multiple Vitamins-Minerals (CENTRUM ULTRA WOMENS) tablet Take 1 capsule by mouth Daily.     • nystatin (MYCOSTATIN) 560778 UNIT/ML suspension Take 500,000 Units by mouth As Needed.     • probiotic (CULTURELLE) capsule capsule Take 1 capsule by mouth Daily.     • Sennosides (SENOKOT PO) Take 4 tablets by mouth Every Other Day.     • simvastatin (ZOCOR) 10 MG tablet Take 10 mg by mouth Every Night.     • tiZANidine (ZANAFLEX) 4 MG tablet 4 mg Every 8 (Eight) Hours.     • traMADol (ULTRAM) 50 MG tablet 50 mg 4 (Four) Times a Day.     • venlafaxine XR (EFFEXOR-XR) 150 MG 24 hr capsule Take  "300 mg by mouth Daily.     • lisinopril (PRINIVIL,ZESTRIL) 10 MG tablet Take 10 mg by mouth 2 times daily       No current facility-administered medications for this visit.        Allergies:  Azelastine-fluticasone; Contrast dye; Iodides; Metformin and related; Oxycodone-aspirin; Pregabalin; Sulfa antibiotics; and Nisoldipine er    Review of Systems  Review of Systems   Constitution: Negative.   HENT: Negative.    Eyes: Negative.    Cardiovascular: Positive for dyspnea on exertion and palpitations. Negative for chest pain, claudication, cyanosis, irregular heartbeat, leg swelling, near-syncope, orthopnea, paroxysmal nocturnal dyspnea and syncope.   Respiratory: Negative.    Endocrine: Negative.    Hematologic/Lymphatic: Negative.    Skin: Negative.    Gastrointestinal: Negative for anorexia.   Genitourinary: Negative.    Neurological: Positive for dizziness.   Psychiatric/Behavioral: Negative.        Objective     Physical Exam:  /92 (BP Location: Left arm, Patient Position: Sitting, Cuff Size: Adult)  Pulse 65  Ht 67\" (170.2 cm)  Wt 280 lb 12.8 oz (127 kg)  SpO2 97%  BMI 43.98 kg/m2  Physical Exam   Constitutional: She appears well-developed.   HENT:   Head: Normocephalic.   Neck: Normal carotid pulses and no JVD present. No tracheal tenderness present. Carotid bruit is not present. No tracheal deviation and no edema present.   Cardiovascular: Regular rhythm, normal heart sounds and normal pulses.    Pulmonary/Chest: Effort normal. No stridor.   Abdominal: Soft.   Neurological: She is alert. She has normal strength. No cranial nerve deficit or sensory deficit.   Skin: Skin is warm.   Psychiatric: She has a normal mood and affect. Her speech is normal and behavior is normal.       Results Review:       ECG 12 Lead  Date/Time: 7/14/2017 10:01 AM  Performed by: JERMAINE CHAVEZ  Authorized by: JERMAINE CHAVEZ   Comparison: compared with previous ECG from 3/17/2017  Similar to previous ECG  Rhythm: sinus " rhythm  Rate: normal  Conduction: conduction normal  ST Segments: ST segments normal  QRS axis: normal  Clinical impression: normal ECG            Assessment/Plan   Patient Active Problem List   Diagnosis   • Palpitation   • Morbid obesity due to excess calories   • BLADIMIR treated with BiPAP   • Chest pain   • Essential hypertension   • Type 2 diabetes mellitus without complication, without long-term current use of insulin   • Mixed hyperlipidemia       Intermittent  palpitations. No significant pedal edema. Compliant with medications and diet. Latest labs and medications reviewed.    Plan:  Keep LDL below 70 mg/dl. Monitor liver and renal functions.    Monitor basic metabolic panel and CBC  Due to see Dr Carlin next week for CKD  Increase Metoprolol from 25 to 50 mg BID  Close follow up with you as scheduled.  Intensive factor modifications.  See order list.    Counseled regarding disease appropriate diet, fluid, caffeine, stimulants and sodium intake as well as importance of compliance to diet, exercise and regular follow up.  Avoid NSAIDS and COX2 inhibitors. Use Acetaminophen PRN.  She does not want referral to bariatric clinic  Monitor for any signs of bleeding including red or dark stools. Fall precautions.   Patient is asked to monitor BP at home or work, several times per month and return with written values at next office visit.     Return in about 3 months (around 10/14/2017).

## 2017-10-16 ENCOUNTER — OFFICE VISIT (OUTPATIENT)
Dept: CARDIOLOGY | Facility: CLINIC | Age: 57
End: 2017-10-16

## 2017-10-16 VITALS
SYSTOLIC BLOOD PRESSURE: 132 MMHG | HEART RATE: 62 BPM | BODY MASS INDEX: 43.95 KG/M2 | DIASTOLIC BLOOD PRESSURE: 90 MMHG | WEIGHT: 280 LBS | HEIGHT: 67 IN

## 2017-10-16 DIAGNOSIS — E66.01 MORBID OBESITY DUE TO EXCESS CALORIES (HCC): ICD-10-CM

## 2017-10-16 DIAGNOSIS — E78.2 MIXED HYPERLIPIDEMIA: ICD-10-CM

## 2017-10-16 DIAGNOSIS — R00.2 PALPITATION: Primary | ICD-10-CM

## 2017-10-16 DIAGNOSIS — K21.9 GASTROESOPHAGEAL REFLUX DISEASE WITHOUT ESOPHAGITIS: ICD-10-CM

## 2017-10-16 DIAGNOSIS — R13.19 OTHER DYSPHAGIA: ICD-10-CM

## 2017-10-16 DIAGNOSIS — G47.33 OSA TREATED WITH BIPAP: ICD-10-CM

## 2017-10-16 DIAGNOSIS — R07.9 CHEST PAIN, UNSPECIFIED TYPE: ICD-10-CM

## 2017-10-16 DIAGNOSIS — I10 ESSENTIAL HYPERTENSION: ICD-10-CM

## 2017-10-16 DIAGNOSIS — F41.9 ANXIETY AND DEPRESSION: ICD-10-CM

## 2017-10-16 DIAGNOSIS — E11.9 TYPE 2 DIABETES MELLITUS WITHOUT COMPLICATION, WITHOUT LONG-TERM CURRENT USE OF INSULIN (HCC): ICD-10-CM

## 2017-10-16 DIAGNOSIS — F32.A ANXIETY AND DEPRESSION: ICD-10-CM

## 2017-10-16 PROCEDURE — 93000 ELECTROCARDIOGRAM COMPLETE: CPT | Performed by: INTERNAL MEDICINE

## 2017-10-16 PROCEDURE — 99214 OFFICE O/P EST MOD 30 MIN: CPT | Performed by: INTERNAL MEDICINE

## 2017-10-16 NOTE — PROGRESS NOTES
Barbraa Tapia  [id number]  1960  57 y.o.  female    Referring Provider: Briana Bartlett MD    Reason for  Visit:  Routine follow up.    Subjective      Shortness of breath, chest pain and bradycardia  Chest pain with exertion and also at rest  Cardiac cath no obstructive coronary artery disease    No diaphoresis  No nausea  No radiation  Precipitated with exertion and also at rest  Moderate dypnea  Compliant with medications  Palpitations better  Being treated for anxiety and depression  Gets intermittent heartburn and dysphagia to solids and liquids    History of present illness:  Barbara Tapia is a 57 y.o. yo female with history of Essential Hypertension    who presents today for   Chief Complaint   Patient presents with   • Palpitations     3 mo f/u   .    History  Past Medical History:   Diagnosis Date   • Arthritis    • Asthma    • Chest pain    • Chronic kidney disease    • Diabetes mellitus    • Fibromyalgia    • Hypertension    • Knee contracture     total   • Sleep apnea    ,   Past Surgical History:   Procedure Laterality Date   • CARDIAC CATHETERIZATION Left 2017    Procedure: Cardiac Catheterization/Vascular Study;  Surgeon: Shane Singh MD;  Location:  [facility];  Service:    • CARDIAC CATHETERIZATION N/A 2017    Procedure: Coronary angiography;  Surgeon: Shane Singh MD;  Location:  [facility];  Service:    • CARDIAC CATHETERIZATION N/A 2017    Procedure: Left Heart Cath;  Surgeon: Shane Singh MD;  Location:  [facility];  Service:    • CERVICAL FUSION      STITCHED UP TO PREVENT LOSING BABY   • CERVIX LESION DESTRUCTION     •  SECTION     • ELBOW PROCEDURE      screws placed x 2   • EXCISION MASS TRUNK      LEFT SIDE CAT SCRATCH FEVER    • PARTIAL HYMENECTOMY     • REPLACEMENT TOTAL KNEE BILATERAL Bilateral    • SINUS SURGERY     ,   Family History   Problem Relation Age of Onset   • Heart disease Mother    • Heart  disease Father    ,   Social History   Substance Use Topics   • Smoking status: Never Smoker   • Smokeless tobacco: Never Used   • Alcohol use No   ,     Medications  Current Outpatient Prescriptions   Medication Sig Dispense Refill   • albuterol (PROVENTIL HFA;VENTOLIN HFA) 108 (90 BASE) MCG/ACT inhaler 2 puffs 4 (Four) Times a Day.     • bisacodyl (DULCOLAX) 5 MG EC tablet Take 25 mg by mouth Every Other Day.     • carboxymethylcellulose (REFRESH PLUS) 0.5 % solution 3 (Three) Times a Day As Needed for Dry Eyes.     • diazePAM (VALIUM) 5 MG tablet Take 5 mg by mouth 3 (Three) Times a Day.     • doxepin (SINEquan) 100 MG capsule Take 100 mg by mouth Every Night.     • escitalopram (LEXAPRO) 20 MG tablet Take 20 mg by mouth daily     • fexofenadine (ALLEGRA) 180 MG tablet Take 180 mg by mouth daily     • glipiZIDE (GLUCOTROL) 10 MG tablet Take 10 mg by mouth 3 (Three) Times a Day.     • hydrOXYzine (VISTARIL) 50 MG capsule Take 50 mg by mouth 2 times daily     • ketoconazole (NIZORAL) 200 MG tablet Take 200 mg by mouth daily     • losartan-hydrochlorothiazide (HYZAAR) 50-12.5 MG per tablet 1 tablet 2 (Two) Times a Day.     • meclizine (ANTIVERT) 12.5 MG tablet Take 12.5 mg by mouth 3 (Three) Times a Day.     • MEGARED OMEGA-3 KRILL  MG capsule 800MG CAPSULE DAILY     • metoprolol tartrate (LOPRESSOR) 25 MG tablet Take 2 tablets by mouth 2 (Two) Times a Day. 180 tablet 3   • Multiple Vitamins-Minerals (CENTRUM ULTRA WOMENS) tablet Take 1 capsule by mouth Daily.     • nystatin (MYCOSTATIN) 369833 UNIT/ML suspension Take 500,000 Units by mouth As Needed.     • probiotic (CULTURELLE) capsule capsule Take 1 capsule by mouth Daily.     • Sennosides (SENOKOT PO) Take 4 tablets by mouth Every Other Day.     • simvastatin (ZOCOR) 10 MG tablet Take 10 mg by mouth Every Night.     • tiZANidine (ZANAFLEX) 4 MG tablet 4 mg Every 8 (Eight) Hours.     • traMADol (ULTRAM) 50 MG tablet 50 mg 4 (Four) Times a Day.     •  "venlafaxine XR (EFFEXOR-XR) 150 MG 24 hr capsule Take 300 mg by mouth Daily.       No current facility-administered medications for this visit.        Allergies:  Azelastine-fluticasone; Contrast dye; Iodides; Metformin and related; Oxycodone-aspirin; Pregabalin; Sulfa antibiotics; and Nisoldipine er    Review of Systems  Review of Systems   Constitution: Negative.   HENT: Negative.    Eyes: Negative.    Cardiovascular: Positive for chest pain, dyspnea on exertion and palpitations. Negative for claudication, cyanosis, irregular heartbeat, leg swelling, near-syncope, orthopnea, paroxysmal nocturnal dyspnea and syncope.   Respiratory: Negative.    Endocrine: Negative.    Hematologic/Lymphatic: Negative.    Skin: Negative.    Gastrointestinal: Positive for dysphagia and heartburn. Negative for anorexia.   Genitourinary: Negative.    Neurological: Positive for dizziness.   Psychiatric/Behavioral: Negative.        Objective     Physical Exam:  /90  Pulse 62  Ht 67\" (170.2 cm)  Wt 280 lb (127 kg)  BMI 43.85 kg/m2  Physical Exam   Constitutional: She appears well-developed.   HENT:   Head: Normocephalic.   Neck: Normal carotid pulses and no JVD present. No tracheal tenderness present. Carotid bruit is not present. No tracheal deviation and no edema present.   Cardiovascular: Regular rhythm, normal heart sounds and normal pulses.    Pulmonary/Chest: Effort normal. No stridor.   Abdominal: Soft.   Neurological: She is alert. She has normal strength. No cranial nerve deficit or sensory deficit.   Skin: Skin is warm.   Psychiatric: She has a normal mood and affect. Her speech is normal and behavior is normal.       Results Review:       ECG 12 Lead  Date/Time: 10/16/2017 11:58 AM  Performed by: JERMAINE CHAVEZ  Authorized by: JERMAINE CHAVEZ   Comparison: compared with previous ECG from 7/14/2017  Similar to previous ECG  Rhythm: sinus rhythm  Rate: normal  ST Segments: ST segments normal  T Waves: T waves normal  QRS " axis: normal  Clinical impression: normal ECG            Assessment/Plan   Patient Active Problem List   Diagnosis   • Palpitation   • Morbid obesity due to excess calories   • BLADIMIR treated with BiPAP   • Chest pain   • Essential hypertension   • Type 2 diabetes mellitus without complication, without long-term current use of insulin   • Mixed hyperlipidemia   • Anxiety and depression   • Other dysphagia   • Gastroesophageal reflux disease without esophagitis       Intermittent  palpitations. No significant pedal edema. Compliant with medications and diet. Latest labs and medications reviewed.    Plan:      Barium swallow as dysphagia  GI referrral  Close follow up with you as scheduled.  Intensive factor modifications.  See order list.    Counseled regarding disease appropriate diet, fluid, caffeine, stimulants and sodium intake as well as importance of compliance to diet, exercise and regular follow up.  Avoid NSAIDS and COX2 inhibitors. Use Acetaminophen PRN.  referral to bariatric clinic  Small frequent meals  Do not eat for 4 hours prior to sleeping  Avoid excessive spicy and hot food intake, restrict caffeine and chocolate intake  Sleep with head end of bed elevated 6 inches   · Change the factors that you can control. Ask your caregiver for guidance concerning weight loss, quitting smoking, and alcohol consumption.  · Avoid foods and drinks that make your symptoms worse, such as:  ¨ Caffeine or alcoholic drinks.  ¨ Chocolate.  ¨ Peppermint or mint flavorings.  ¨ Garlic and onions.  ¨ Spicy foods.  ¨ Citrus fruits, such as oranges, harris, or limes.  ¨ Tomato-based foods such as sauce, chili, salsa, and pizza.  ¨ Fried and fatty foods.  · Avoid lying down for the 3 hours prior to your bedtime or prior to taking a nap.  · Eat small, frequent meals instead of large meals.  · Wear loose-fitting clothing. Do not wear anything tight around your waist that causes pressure on your stomach.  · Raise the head of your bed  6 to 8 inches with wood blocks to help you sleep. Extra pillows will not help.  · Only take over-the-counter or prescription medicines for pain, discomfort, or fever as directed by your caregiver.  · Do not take aspirin, ibuprofen, or other nonsteroidal anti-inflammatory drugs (NSAIDs).  Can stop Aspirin    Return in about 6 months (around 4/16/2018).

## 2017-10-23 ENCOUNTER — HOSPITAL ENCOUNTER (OUTPATIENT)
Dept: GENERAL RADIOLOGY | Facility: HOSPITAL | Age: 57
Discharge: HOME | End: 2017-10-23
Attending: INTERNAL MEDICINE | Admitting: INTERNAL MEDICINE

## 2017-10-23 DIAGNOSIS — R13.19 OTHER DYSPHAGIA: ICD-10-CM

## 2017-10-23 PROCEDURE — 74220 X-RAY XM ESOPHAGUS 1CNTRST: CPT

## 2017-11-06 ENCOUNTER — OFFICE VISIT (OUTPATIENT)
Dept: GASTROENTEROLOGY | Facility: CLINIC | Age: 57
End: 2017-11-06

## 2017-11-06 VITALS
WEIGHT: 278 LBS | HEART RATE: 70 BPM | SYSTOLIC BLOOD PRESSURE: 120 MMHG | DIASTOLIC BLOOD PRESSURE: 80 MMHG | OXYGEN SATURATION: 98 % | BODY MASS INDEX: 43.63 KG/M2 | HEIGHT: 67 IN

## 2017-11-06 DIAGNOSIS — Z12.11 ENCOUNTER FOR SCREENING FOR MALIGNANT NEOPLASM OF COLON: Primary | ICD-10-CM

## 2017-11-06 DIAGNOSIS — K59.01 SLOW TRANSIT CONSTIPATION: ICD-10-CM

## 2017-11-06 DIAGNOSIS — E66.9 OBESITY, UNSPECIFIED OBESITY SEVERITY, UNSPECIFIED OBESITY TYPE: ICD-10-CM

## 2017-11-06 DIAGNOSIS — Z78.9 NONSMOKER: ICD-10-CM

## 2017-11-06 DIAGNOSIS — K21.9 GASTROESOPHAGEAL REFLUX DISEASE, ESOPHAGITIS PRESENCE NOT SPECIFIED: ICD-10-CM

## 2017-11-06 DIAGNOSIS — R13.19 ESOPHAGEAL DYSPHAGIA: ICD-10-CM

## 2017-11-06 PROCEDURE — 99204 OFFICE O/P NEW MOD 45 MIN: CPT | Performed by: CLINICAL NURSE SPECIALIST

## 2017-11-06 NOTE — PROGRESS NOTES
Barbara Tapia  1960    11/6/2017  Chief Complaint   Patient presents with   • GI Problem     New patient ref by Dr. Singh for problems swallowing     Subjective   HPI  Barbara Tapia is a 57 y.o. female who presents with a complaint of difficulty swallowing. She says that this is persistent ongoing for approx 1-2 years. It has become more often and worsening chapincito with meat and breads. It is located in the upper esophageal region. It is associated with nausea at times. No vomiting. It occurs up to twice per week and can be severe.  Liquids do help and at times she has to regurgitate her foods. She has not had dilatation in the past. She has had an esophagram on 10/213/2017 showing hiatal hernia, mucosal ring, no hold up of tablet, Mild GERD, dysmotility noted contributing to stasis of barium.     She has had a colonoscopy which was approx 1-2 years ago in [place], [place]. She says that this exam was incomplete with poor prep. She hx of polyps. No family hx for colon cancer. No BRBPR. No melena. No change in bowels she has chronic constipation. She treats this with Dulcolax and MOM. She has tried Miralax in the past but not daily. She is on pain medications daily.     Past Medical History:   Diagnosis Date   • Arthritis    • Asthma    • Chest pain    • Chronic kidney disease     Kidneys are only functioning 50 %   • Diabetes    • Diabetes mellitus    • Fibromyalgia    • Hypertension    • Knee contracture     total   • Sleep apnea      Past Surgical History:   Procedure Laterality Date   • ANKLE SURGERY      multiple surgeries   • CARDIAC CATHETERIZATION Left 4/19/2017    Procedure: Cardiac Catheterization/Vascular Study;  Surgeon: Shane Singh MD;  Location:  [facility] LOC[facility];  Service:    • CARDIAC CATHETERIZATION N/A 4/19/2017    Procedure: Coronary angiography;  Surgeon: Shane Singh MD;  Location:  [facility];  Service:    • CARDIAC CATHETERIZATION N/A 4/19/2017    Procedure: Left Heart  Cath;  Surgeon: Shane Singh MD;  Location: [facility] INVASIVE LOCATION;  Service:    • CERVICAL FUSION      STITCHED UP TO PREVENT LOSING BABY   • CERVIX LESION DESTRUCTION     •  SECTION     • ELBOW PROCEDURE      screws placed x 2   • EXCISION MASS TRUNK      LEFT SIDE CAT SCRATCH FEVER    • PARTIAL HYMENECTOMY     • REPLACEMENT TOTAL KNEE BILATERAL Bilateral    • ROTATOR CUFF REPAIR     • SINUS SURGERY       Outpatient Prescriptions Marked as Taking for the 17 encounter (Office Visit) with ELTON Jiang   Medication Sig Dispense Refill   • albuterol (PROVENTIL HFA;VENTOLIN HFA) 108 (90 BASE) MCG/ACT inhaler 2 puffs 4 (Four) Times a Day.     • bisacodyl (DULCOLAX) 5 MG EC tablet Take 25 mg by mouth Every Other Day.     • carboxymethylcellulose (REFRESH PLUS) 0.5 % solution 3 (Three) Times a Day As Needed for Dry Eyes.     • diazePAM (VALIUM) 5 MG tablet Take 5 mg by mouth 3 (Three) Times a Day.     • doxepin (SINEquan) 100 MG capsule Take 100 mg by mouth Every Night.     • escitalopram (LEXAPRO) 20 MG tablet Take 20 mg by mouth daily     • fexofenadine (ALLEGRA) 180 MG tablet Take 180 mg by mouth daily     • glipiZIDE (GLUCOTROL) 10 MG tablet Take 10 mg by mouth 3 (Three) Times a Day.     • hydrOXYzine (VISTARIL) 50 MG capsule Take 50 mg by mouth 2 times daily     • ketoconazole (NIZORAL) 200 MG tablet Take 200 mg by mouth daily     • losartan-hydrochlorothiazide (HYZAAR) 50-12.5 MG per tablet 1 tablet 2 (Two) Times a Day.     • meclizine (ANTIVERT) 12.5 MG tablet Take 12.5 mg by mouth 3 (Three) Times a Day.     • MEGARED OMEGA-3 KRILL  MG capsule 800MG CAPSULE DAILY     • metoprolol tartrate (LOPRESSOR) 25 MG tablet Take 2 tablets by mouth 2 (Two) Times a Day. 180 tablet 3   • Multiple Vitamins-Minerals (CENTRUM ULTRA WOMENS) tablet Take 1 capsule by mouth Daily.     • nystatin (MYCOSTATIN) 827769 UNIT/ML suspension Take 500,000 Units by mouth As Needed.     • probiotic  (CULTURELLE) capsule capsule Take 1 capsule by mouth Daily.     • Sennosides (SENOKOT PO) Take 4 tablets by mouth Every Other Day.     • simvastatin (ZOCOR) 10 MG tablet Take 10 mg by mouth Every Night.     • tiZANidine (ZANAFLEX) 4 MG tablet 4 mg Every 8 (Eight) Hours.     • traMADol (ULTRAM) 50 MG tablet 50 mg 4 (Four) Times a Day.     • venlafaxine XR (EFFEXOR-XR) 150 MG 24 hr capsule Take 300 mg by mouth Daily.       Allergies   Allergen Reactions   • Azelastine-Fluticasone    • Contrast Dye    • Iodides    • Metformin And Related    • Oxycodone-Aspirin    • Pregabalin    • Sulfa Antibiotics    • Nisoldipine Er Rash     Social History     Social History   • Marital status:      Spouse name: N/A   • Number of children: N/A   • Years of education: N/A     Occupational History   • Not on file.     Social History Main Topics   • Smoking status: Never Smoker   • Smokeless tobacco: Never Used   • Alcohol use No   • Drug use: No   • Sexual activity: Defer     Other Topics Concern   • Not on file     Social History Narrative     Family History   Problem Relation Age of Onset   • Heart disease Mother    • Heart disease Father    • Colon cancer Neg Hx    • Colon polyps Neg Hx      Health Maintenance   Topic Date Due   • PNEUMOCOCCAL VACCINE (19-64 MEDIUM RISK) (1 of 1 - PPSV23) 03/13/1977   • TDAP/TD VACCINES (1 - Tdap) 03/13/1977   • HEPATITIS C SCREENING  04/14/2017   • MEDICARE ANNUAL WELLNESS  04/14/2017   • DIABETIC FOOT EXAM  04/14/2017   • MAMMOGRAM  04/14/2017   • PAP SMEAR  04/14/2017   • LIPID PANEL  04/14/2017   • HEMOGLOBIN A1C  04/14/2017   • DIABETIC EYE EXAM  04/14/2017   • URINE MICROALBUMIN  04/14/2017   • COLONOSCOPY  04/14/2017   • INFLUENZA VACCINE  08/01/2017     Review of Systems   Constitutional: Negative for activity change, appetite change, chills, diaphoresis, fatigue, fever and unexpected weight change.   HENT: Positive for trouble swallowing. Negative for ear pain, hearing loss, mouth  "sores, sore throat and voice change.    Eyes: Negative.    Respiratory: Negative for cough, choking, shortness of breath and wheezing.    Cardiovascular: Negative for chest pain and palpitations.   Gastrointestinal: Negative for abdominal pain, blood in stool, constipation, diarrhea, nausea and vomiting.   Endocrine: Negative for cold intolerance and heat intolerance.   Genitourinary: Negative for decreased urine volume, dysuria, frequency, hematuria and urgency.   Musculoskeletal: Negative for back pain, gait problem and myalgias.   Skin: Negative for color change, pallor and rash.   Allergic/Immunologic: Negative for food allergies and immunocompromised state.   Neurological: Negative for dizziness, tremors, seizures, syncope, weakness, light-headedness, numbness and headaches.   Hematological: Negative for adenopathy. Does not bruise/bleed easily.   Psychiatric/Behavioral: Negative for agitation and confusion. The patient is not nervous/anxious.    All other systems reviewed and are negative.    Objective   Vitals:    11/06/17 1250   BP: 120/80   Pulse: 70   SpO2: 98%   Weight: 278 lb (126 kg)   Height: 67\" (170.2 cm)     Body mass index is 43.54 kg/(m^2).  Physical Exam   Constitutional: She is oriented to person, place, and time. She appears well-developed and well-nourished.   HENT:   Head: Normocephalic and atraumatic.   Eyes: Pupils are equal, round, and reactive to light.   Neck: Normal range of motion. Neck supple. No tracheal deviation present.   Cardiovascular: Normal rate, regular rhythm and normal heart sounds.  Exam reveals no gallop and no friction rub.    No murmur heard.  Pulmonary/Chest: Effort normal and breath sounds normal. No respiratory distress. She has no wheezes. She has no rales. She exhibits no tenderness.   Abdominal: Soft. Bowel sounds are normal. She exhibits no distension. There is no hepatosplenomegaly. There is no tenderness. There is no rigidity, no rebound and no guarding. "   Musculoskeletal: Normal range of motion. She exhibits no edema, tenderness or deformity.   Neurological: She is alert and oriented to person, place, and time. She has normal reflexes.   Skin: Skin is warm and dry. No rash noted. No pallor.   Psychiatric: She has a normal mood and affect. Her behavior is normal. Judgment and thought content normal.     Assessment/Plan   Barbara was seen today for gi problem.    Diagnoses and all orders for this visit:    Encounter for screening for malignant neoplasm of colon  -     polyethylene glycol (GoLYTELY) 236 g solution; Take as directed by office instructions.  -     Case Request; Standing  -     Implement Anesthesia Orders Day of Procedure; Standing  -     Obtain Informed Consent; Standing  -     Verify Bowel Prep Was Successful; Standing  -     Case Request    Esophageal dysphagia    Gastroesophageal reflux disease, esophagitis presence not specified    Nonsmoker    Obesity, unspecified obesity severity, unspecified obesity type    Slow transit constipation    I have suggested Zantac 150 mg BID as well as Miralax daily 17 grams up to twice per day.    ESOPHAGOGASTRODUODENOSCOPY WITH ANESTHESIA (N/A), COLONOSCOPY WITH ANESTHESIA (N/A)  EMR Dragon/transcription disclaimer: Much of this encounter note is electronic transcription/translation of spoken language to printed text. The electronic translation of spoken language may be erroneous, or at times, nonsensical words or phrases may be inadvertently transcribed. Although I have reviewed the note for such errors, some may still exist.  Body mass index is 43.54 kg/(m^2).  Return if symptoms worsen or fail to improve.      All risks, benefits, alternatives, and indications of colonoscopy and/or Endoscopy procedure have been discussed with the patient. Risks to include perforation of the colon requiring possible surgery or colostomy, risk of bleeding from biopsies or removal of colon tissue, possibility of missing a colon polyp  or cancer, or adverse drug reaction.  Benefits to include the diagnosis and management of disease of the colon and rectum. Alternatives to include barium enema, radiographic evaluation, lab testing or no intervention. Pt verbalizes understanding and agrees.

## 2017-11-08 ENCOUNTER — APPOINTMENT (OUTPATIENT)
Dept: LAB | Facility: HOSPITAL | Age: 57
End: 2017-11-08
Attending: NURSE PRACTITIONER

## 2017-11-08 ENCOUNTER — OFFICE VISIT (OUTPATIENT)
Dept: BARIATRICS/WEIGHT MGMT | Facility: CLINIC | Age: 57
End: 2017-11-08

## 2017-11-08 ENCOUNTER — OFFICE VISIT (OUTPATIENT)
Dept: BARIATRICS/WEIGHT MGMT | Facility: HOSPITAL | Age: 57
End: 2017-11-08

## 2017-11-08 VITALS
BODY MASS INDEX: 45.61 KG/M2 | TEMPERATURE: 97.1 F | OXYGEN SATURATION: 98 % | HEIGHT: 66 IN | WEIGHT: 283.8 LBS | HEART RATE: 67 BPM | DIASTOLIC BLOOD PRESSURE: 78 MMHG | SYSTOLIC BLOOD PRESSURE: 147 MMHG

## 2017-11-08 DIAGNOSIS — R53.83 FATIGUE, UNSPECIFIED TYPE: ICD-10-CM

## 2017-11-08 DIAGNOSIS — E66.01 OBESITY, CLASS III, BMI 40-49.9 (MORBID OBESITY) (HCC): Primary | ICD-10-CM

## 2017-11-08 DIAGNOSIS — G47.33 OBSTRUCTIVE SLEEP APNEA SYNDROME: ICD-10-CM

## 2017-11-08 DIAGNOSIS — I10 ESSENTIAL HYPERTENSION: ICD-10-CM

## 2017-11-08 DIAGNOSIS — E55.9 VITAMIN D DEFICIENCY: ICD-10-CM

## 2017-11-08 DIAGNOSIS — E11.9 TYPE 2 DIABETES MELLITUS WITHOUT COMPLICATION, WITHOUT LONG-TERM CURRENT USE OF INSULIN (HCC): ICD-10-CM

## 2017-11-08 DIAGNOSIS — E78.2 MIXED HYPERLIPIDEMIA: ICD-10-CM

## 2017-11-08 LAB
25(OH)D3 SERPL-MCNC: 49.4 NG/ML (ref 30–100)
TSH SERPL DL<=0.05 MIU/L-ACNC: 1.47 MIU/ML (ref 0.47–4.68)
VIT B12 BLD-MCNC: 438 PG/ML (ref 239–931)

## 2017-11-08 PROCEDURE — 82306 VITAMIN D 25 HYDROXY: CPT | Performed by: NURSE PRACTITIONER

## 2017-11-08 PROCEDURE — 97802 MEDICAL NUTRITION INDIV IN: CPT

## 2017-11-08 PROCEDURE — 82607 VITAMIN B-12: CPT | Performed by: NURSE PRACTITIONER

## 2017-11-08 PROCEDURE — 36415 COLL VENOUS BLD VENIPUNCTURE: CPT | Performed by: NURSE PRACTITIONER

## 2017-11-08 PROCEDURE — 99204 OFFICE O/P NEW MOD 45 MIN: CPT | Performed by: NURSE PRACTITIONER

## 2017-11-08 PROCEDURE — 84443 ASSAY THYROID STIM HORMONE: CPT | Performed by: NURSE PRACTITIONER

## 2017-11-08 NOTE — PATIENT INSTRUCTIONS
Barbara Tapia is a 57 y.o. who has a history of morbid obesity and desires medical weight loss only. She may want surgery at a later date.  Patient's personal weight loss goal is to lose over 100 pounds total. First goal is to get 14 pounds off.   Baseline labs will be ordered today.  Patient will see the dietician today for make specific goals for diet, exercise, and lifestyle. Patient has received intensive behavioral therapy for obesity today. I will have them follow up in one month for a weight recheck and to further discuss options.

## 2017-11-08 NOTE — PROGRESS NOTES
"Nutrition Bariatric/MWL Note     Visit   Initial Assessment     Anthropometrics   Height: 66\"  Weight: 283.5#  BMI: 45.8    Waist:  55\"  Hip: 56\"  Chest: 54\"  Thigh: 26\"  Arm: 17.5\"  Body Fat:  >50%    Nutrition Recall  24 Hour recall:  (B) 2 sl bread w/strawberry jam, coffee w/cream or water or juice (L) fast food - chicken nuggets, french fries, Dr Pepper (D) fast food, Dr Pepper  Eating 3 meals daily   Protein lacking at breakfast  Snacking in afternoon and evening: fruit, ice cream  Excessive sweet intake  Large portions  Drinking with meals   Drinking carbonated beverages  Drinking less than 64 fluid ounces. Pt stated kidney doctor wants her to limit fluids to 60 oz per day    Exercise   None    Habits:  None    Education    Goal Setting and Information Packet    Nutrition Goals   Eat 3-4 meals per day with protein  Eat protein first at meals  Eliminate snacks  Healthier food choices  Portion control / Use smaller plate or measuring cup   Decrease soda intake in half and replace with water  Replace sugar beverages with artifical sweetened   Increase fluid intake to 60 ounces per day    Exercise Goals  Add 15-30 minutes of walking, cycling, elliptical, swimming, chair, yoga or crossfit daily    Jaimee Barron RD, LD  11/08/2017  1:12 PM    "

## 2017-11-08 NOTE — PROGRESS NOTES
"Subjective   Barbara Tapia is a 57 y.o. female.     History of Present Illness   Barbara Tapia is a 57 y.o. year-old who has morbid obesity and is interested in having medical weight loss only versus bariatric surgery. Patient has been overweight for the past 10-15 years. The most weight ever lost was 22 pounds from basically not eating much. Unsupervised diet attempts include: low fat diet, low carb diet, and Slim Fast diet.  Supervised diet attempts include: none. Patient has tried the following OTC or prescription medications for weight loss: none. Patient states she eats due to anxiety, depression, and food makes her feel better. Patient is limited in activities due to: left ankle with hx of multiple surgeries. Her orthopedist will not allow her to get on treadmill.   Vitals:    17 1305   BP: 147/78   BP Location: Right arm   Patient Position: Sitting   Cuff Size: Adult   Pulse: 67   Temp: 97.1 °F (36.2 °C)   SpO2: 98%   Weight: 283 lb 12.8 oz (129 kg)   Height: 66\" (167.6 cm)     She  has a past medical history of Anxiety; Arthritis; Asthma; Back pain; Chest pain; Chronic fatigue; Chronic kidney disease; Diabetes; Diabetes mellitus; Fibromyalgia; Hiatal hernia; Hypertension; Knee contracture; Sleep apnea; and Sleep apnea.  She  does not have any pertinent problems on file.  She  has a past surgical history that includes  section; Excision Mass Trunk; Cervix lesion destruction; Cervical fusion; Elbow surgery (Left); Sinus surgery; Replacement total knee bilateral (Bilateral); Cardiac catheterization (Left, 2017); Cardiac catheterization (N/A, 2017); Cardiac catheterization (N/A, 2017); Ankle surgery (Left); Rotator cuff repair; Tennis Elbow Release (Right); Partial hysterectomy; and Colonoscopy.  Her family history includes Diabetes in her mother; Heart disease in her brother, father, and mother; Hypertension in her mother; Obesity in her mother; Stroke in her brother and mother. " There is no history of Colon cancer or Colon polyps.  She  reports that she has never smoked. She has never used smokeless tobacco. She reports that she does not drink alcohol or use illicit drugs.  Current Outpatient Prescriptions   Medication Sig Dispense Refill   • albuterol (PROVENTIL HFA;VENTOLIN HFA) 108 (90 BASE) MCG/ACT inhaler 2 puffs 4 (Four) Times a Day.     • bisacodyl (DULCOLAX) 5 MG EC tablet Take 25 mg by mouth Every Other Day.     • carboxymethylcellulose (REFRESH PLUS) 0.5 % solution 3 (Three) Times a Day As Needed for Dry Eyes.     • diazePAM (VALIUM) 5 MG tablet Take 5 mg by mouth 3 (Three) Times a Day.     • doxepin (SINEquan) 100 MG capsule Take 100 mg by mouth Every Night.     • escitalopram (LEXAPRO) 20 MG tablet Take 20 mg by mouth daily     • fexofenadine (ALLEGRA) 180 MG tablet Take 180 mg by mouth daily     • glipiZIDE (GLUCOTROL) 10 MG tablet Take 10 mg by mouth 3 (Three) Times a Day.     • hydrOXYzine (VISTARIL) 50 MG capsule Take 50 mg by mouth 2 times daily     • ketoconazole (NIZORAL) 200 MG tablet Take 200 mg by mouth daily     • losartan-hydrochlorothiazide (HYZAAR) 50-12.5 MG per tablet 1 tablet 2 (Two) Times a Day.     • meclizine (ANTIVERT) 12.5 MG tablet Take 12.5 mg by mouth 3 (Three) Times a Day.     • MEGARED OMEGA-3 KRILL  MG capsule 800MG CAPSULE DAILY     • metoprolol tartrate (LOPRESSOR) 25 MG tablet Take 2 tablets by mouth 2 (Two) Times a Day. 180 tablet 3   • Multiple Vitamins-Minerals (CENTRUM ULTRA WOMENS) tablet Take 1 capsule by mouth Daily.     • nystatin (MYCOSTATIN) 112801 UNIT/ML suspension Take 500,000 Units by mouth As Needed.     • polyethylene glycol (GoLYTELY) 236 g solution Take as directed by office instructions. 4000 mL 0   • probiotic (CULTURELLE) capsule capsule Take 1 capsule by mouth Daily.     • Sennosides (SENOKOT PO) Take 4 tablets by mouth Every Other Day.     • simvastatin (ZOCOR) 10 MG tablet Take 10 mg by mouth Every Night.     •  tiZANidine (ZANAFLEX) 4 MG tablet 4 mg Every 8 (Eight) Hours.     • traMADol (ULTRAM) 50 MG tablet 50 mg 4 (Four) Times a Day.     • venlafaxine XR (EFFEXOR-XR) 150 MG 24 hr capsule Take 300 mg by mouth Daily.       No current facility-administered medications for this visit.      She is allergic to azelastine-fluticasone; contrast dye; iodides; metformin and related; oxycodone-aspirin; pregabalin; sulfa antibiotics; and nisoldipine er.      The following portions of the patient's history were reviewed and updated as appropriate: allergies, current medications, past family history, past medical history, past social history, past surgical history and problem list.    Review of Systems   Constitutional: Positive for appetite change and fatigue. Negative for activity change.   HENT: Positive for nosebleeds and postnasal drip.    Eyes: Negative.         Wears glasses   Respiratory: Positive for apnea and shortness of breath. Negative for cough and chest tightness.         Hx of asthma and sleep apnea wears CPAP   Cardiovascular: Positive for chest pain, palpitations and leg swelling.        HTN and elevated triglycerides and cholesterol   Gastrointestinal: Positive for abdominal pain and constipation. Negative for anal bleeding, nausea and vomiting.        Hiatal hernia, heartburn   Endocrine: Negative.         DM   Genitourinary: Positive for dysuria.        50% kidney fx   Musculoskeletal: Positive for arthralgias, back pain, gait problem, myalgias and neck pain.   Skin: Positive for rash.        Currently has skin fungus and is being tx   Allergic/Immunologic: Negative.    Neurological: Positive for dizziness, weakness and numbness. Negative for seizures and syncope.   Hematological: Bruises/bleeds easily.   Psychiatric/Behavioral: Positive for dysphoric mood and sleep disturbance. Negative for self-injury and suicidal ideas. The patient is nervous/anxious.         Sees a mental health provider       Objective    Physical Exam   Constitutional: She is oriented to person, place, and time. Vital signs are normal. She appears well-developed and well-nourished. She is cooperative. No distress.   HENT:   Head: Normocephalic and atraumatic.   Nose: Nose normal.   Mouth/Throat: Oropharynx is clear and moist. No oropharyngeal exudate or tonsillar abscesses.   Eyes: Conjunctivae, EOM and lids are normal. Pupils are equal, round, and reactive to light. Right eye exhibits no discharge. Left eye exhibits no discharge.   Glasses noted   Neck: Trachea normal. Neck supple. No JVD present. Carotid bruit is not present. No rigidity. No tracheal deviation present. No thyromegaly present.   Cardiovascular: Normal rate, regular rhythm, S1 normal, S2 normal and normal heart sounds.    Pulmonary/Chest: Effort normal and breath sounds normal. No stridor. No respiratory distress. She has no wheezes. She has no rales.   Abdominal: Soft. Bowel sounds are normal. She exhibits no distension. There is no tenderness.   Obese; healed scars    Musculoskeletal: She exhibits edema.        Right shoulder: She exhibits normal strength.   Left ankle swollen and scars noted   Lymphadenopathy:     She has no cervical adenopathy.   Neurological: She is alert and oriented to person, place, and time. She has normal strength. No cranial nerve deficit.   Skin: Skin is warm, dry and intact. No rash noted.   Psychiatric: She has a normal mood and affect. Her speech is normal and behavior is normal.   Alert and oriented x 3   Vitals reviewed.      Assessment/Plan   Barbara was seen today for consult, obesity, nutrition counseling and weight loss.    Diagnoses and all orders for this visit:    Obesity, Class III, BMI 40-49.9 (morbid obesity)  -     Bariatric Nutritional Counseling; Standing  -     TSH  -     Vitamin D 25 Hydroxy  -     Vitamin B12    Essential hypertension  -     Bariatric Nutritional Counseling; Standing  -     TSH  -     Vitamin D 25 Hydroxy  -      Vitamin B12    Mixed hyperlipidemia  -     Bariatric Nutritional Counseling; Standing  -     TSH  -     Vitamin D 25 Hydroxy  -     Vitamin B12    Type 2 diabetes mellitus without complication, without long-term current use of insulin  -     Bariatric Nutritional Counseling; Standing  -     TSH  -     Vitamin D 25 Hydroxy  -     Vitamin B12    Obstructive sleep apnea syndrome  -     TSH  -     Vitamin D 25 Hydroxy  -     Vitamin B12    Vitamin D deficiency  -     TSH  -     Vitamin D 25 Hydroxy  -     Vitamin B12    Fatigue, unspecified type  -     TSH  -     Vitamin D 25 Hydroxy  -     Vitamin B12          Barbara D Willie is a 57 y.o. who has a history of morbid obesity and desires medical weight loss only. She may want surgery at a later date.  Patient's personal weight loss goal is to lose over 100 pounds total. First goal is to get 14 pounds off.   Baseline labs will be ordered today.  Patient will see the dietician today for make specific goals for diet, exercise, and lifestyle. Patient has received intensive behavioral therapy for obesity today. I will have them follow up in one month for a weight recheck and to further discuss options.

## 2017-11-21 ENCOUNTER — ANESTHESIA (OUTPATIENT)
Dept: GASTROENTEROLOGY | Facility: HOSPITAL | Age: 57
End: 2017-11-21

## 2017-11-21 ENCOUNTER — HOSPITAL ENCOUNTER (OUTPATIENT)
Facility: HOSPITAL | Age: 57
Setting detail: HOSPITAL OUTPATIENT SURGERY
Discharge: HOME | End: 2017-11-21
Attending: INTERNAL MEDICINE | Admitting: INTERNAL MEDICINE

## 2017-11-21 ENCOUNTER — ANESTHESIA EVENT (OUTPATIENT)
Dept: GASTROENTEROLOGY | Facility: HOSPITAL | Age: 57
End: 2017-11-21

## 2017-11-21 VITALS
BODY MASS INDEX: 43.63 KG/M2 | RESPIRATION RATE: 15 BRPM | SYSTOLIC BLOOD PRESSURE: 114 MMHG | WEIGHT: 278 LBS | HEART RATE: 60 BPM | HEIGHT: 67 IN | OXYGEN SATURATION: 98 % | TEMPERATURE: 97.6 F | DIASTOLIC BLOOD PRESSURE: 69 MMHG

## 2017-11-21 DIAGNOSIS — Z12.11 ENCOUNTER FOR SCREENING FOR MALIGNANT NEOPLASM OF COLON: ICD-10-CM

## 2017-11-21 LAB — GLUCOSE BLDC GLUCOMTR-MCNC: 123 MG/DL (ref 70–130)

## 2017-11-21 PROCEDURE — 82962 GLUCOSE BLOOD TEST: CPT

## 2017-11-21 PROCEDURE — 43450 DILATE ESOPHAGUS 1/MULT PASS: CPT | Performed by: INTERNAL MEDICINE

## 2017-11-21 PROCEDURE — 25010000002 PROPOFOL 10 MG/ML EMULSION: Performed by: NURSE ANESTHETIST, CERTIFIED REGISTERED

## 2017-11-21 PROCEDURE — 43235 EGD DIAGNOSTIC BRUSH WASH: CPT | Performed by: INTERNAL MEDICINE

## 2017-11-21 PROCEDURE — G0121 COLON CA SCRN NOT HI RSK IND: HCPCS | Performed by: INTERNAL MEDICINE

## 2017-11-21 RX ORDER — SODIUM CHLORIDE 9 MG/ML
500 INJECTION, SOLUTION INTRAVENOUS CONTINUOUS PRN
Status: DISCONTINUED | OUTPATIENT
Start: 2017-11-21 | End: 2017-11-21 | Stop reason: HOSPADM

## 2017-11-21 RX ORDER — SODIUM CHLORIDE 0.9 % (FLUSH) 0.9 %
3 SYRINGE (ML) INJECTION AS NEEDED
Status: DISCONTINUED | OUTPATIENT
Start: 2017-11-21 | End: 2017-11-21 | Stop reason: HOSPADM

## 2017-11-21 RX ORDER — PROPOFOL 10 MG/ML
VIAL (ML) INTRAVENOUS AS NEEDED
Status: DISCONTINUED | OUTPATIENT
Start: 2017-11-21 | End: 2017-11-21 | Stop reason: SURG

## 2017-11-21 RX ADMIN — SODIUM CHLORIDE 500 ML: 9 INJECTION, SOLUTION INTRAVENOUS at 11:59

## 2017-11-21 RX ADMIN — PROPOFOL 80 MG: 10 INJECTION, EMULSION INTRAVENOUS at 12:52

## 2017-11-21 RX ADMIN — PROPOFOL 200 MG: 10 INJECTION, EMULSION INTRAVENOUS at 12:53

## 2017-11-21 NOTE — ANESTHESIA POSTPROCEDURE EVALUATION
Patient: Barbara Tapia    Procedure Summary     Date Anesthesia Start Anesthesia Stop Room / Location    11/21/17 1249 1317  [facility] /  [facility]       Procedure Diagnosis Surgeon Provider    ESOPHAGOGASTRODUODENOSCOPY WITH ANESTHESIA (N/A Esophagus); COLONOSCOPY WITH ANESTHESIA (N/A ) Encounter for screening for malignant neoplasm of colon  (Encounter for screening for malignant neoplasm of colon [Z12.11]) MD Philip Otto CRNA          Anesthesia Type: general  Last vitals  BP   157/90 (11/21/17 1134)   Temp   97.6 °F (36.4 °C) (11/21/17 1134)   Pulse   67 (11/21/17 1134)   Resp   18 (11/21/17 1134)     SpO2   94 % (11/21/17 1134)     Post Anesthesia Care and Evaluation    Patient location during evaluation: PHASE II  Patient participation: complete - patient participated  Level of consciousness: awake and alert  Pain score: 0  Pain management: satisfactory to patient  Airway patency: patent  Anesthetic complications: No anesthetic complications    Cardiovascular status: acceptable  Respiratory status: acceptable  Hydration status: acceptable  No anesthesia care post op

## 2017-11-21 NOTE — PLAN OF CARE
Problem: Patient Care Overview (Adult)  Goal: Plan of Care Review  Outcome: Ongoing (interventions implemented as appropriate)    11/21/17 5119   Coping/Psychosocial Response Interventions   Plan Of Care Reviewed With patient   Patient Care Overview   Progress improving   Outcome Evaluation   Outcome Summary/Follow up Plan no problems noted.

## 2017-11-21 NOTE — PLAN OF CARE
Problem: GI Endoscopy (Adult)  Goal: Signs and Symptoms of Listed Potential Problems Will be Absent or Manageable (GI Endoscopy)  Outcome: Ongoing (interventions implemented as appropriate)    11/21/17 1258   GI Endoscopy   Problems Assessed (GI Endoscopy) all   Problems Present (GI Endoscopy) none

## 2017-11-21 NOTE — PLAN OF CARE
Problem: Patient Care Overview (Adult)  Goal: Plan of Care Review  Outcome: Ongoing (interventions implemented as appropriate)    11/21/17 5030   Coping/Psychosocial Response Interventions   Plan Of Care Reviewed With patient;spouse   Patient Care Overview   Progress improving   Outcome Evaluation   Outcome Summary/Follow up Plan D/C CRITERIA MET       Goal: Adult Individualization and Mutuality  Outcome: Outcome(s) achieved Date Met:  11/21/17

## 2017-11-21 NOTE — H&P (VIEW-ONLY)
Barbara Tapia  1960    11/6/2017  Chief Complaint   Patient presents with   • GI Problem     New patient ref by Dr. Singh for problems swallowing     Subjective   HPI  Barbara Tapia is a 57 y.o. female who presents with a complaint of difficulty swallowing. She says that this is persistent ongoing for approx 1-2 years. It has become more often and worsening chapincito with meat and breads. It is located in the upper esophageal region. It is associated with nausea at times. No vomiting. It occurs up to twice per week and can be severe.  Liquids do help and at times she has to regurgitate her foods. She has not had dilatation in the past. She has had an esophagram on 10/213/2017 showing hiatal hernia, mucosal ring, no hold up of tablet, Mild GERD, dysmotility noted contributing to stasis of barium.     She has had a colonoscopy which was approx 1-2 years ago in [place], [place]. She says that this exam was incomplete with poor prep. She hx of polyps. No family hx for colon cancer. No BRBPR. No melena. No change in bowels she has chronic constipation. She treats this with Dulcolax and MOM. She has tried Miralax in the past but not daily. She is on pain medications daily.     Past Medical History:   Diagnosis Date   • Arthritis    • Asthma    • Chest pain    • Chronic kidney disease     Kidneys are only functioning 50 %   • Diabetes    • Diabetes mellitus    • Fibromyalgia    • Hypertension    • Knee contracture     total   • Sleep apnea      Past Surgical History:   Procedure Laterality Date   • ANKLE SURGERY      multiple surgeries   • CARDIAC CATHETERIZATION Left 4/19/2017    Procedure: Cardiac Catheterization/Vascular Study;  Surgeon: Shane Singh MD;  Location:  [facility] CATH INVASIVE LOCATION;  Service:    • CARDIAC CATHETERIZATION N/A 4/19/2017    Procedure: Coronary angiography;  Surgeon: Shane Singh MD;  Location:  [facility] CATH INVASIVE LOCATION;  Service:    • CARDIAC CATHETERIZATION N/A 4/19/2017    Procedure: Left Heart  Cath;  Surgeon: Shane Singh MD;  Location: [facility] INVASIVE LOC[facility];  Service:    • CERVICAL FUSION      STITCHED UP TO PREVENT LOSING BABY   • CERVIX LESION DESTRUCTION     •  SECTION     • ELBOW PROCEDURE      screws placed x 2   • EXCISION MASS TRUNK      LEFT SIDE CAT SCRATCH FEVER    • PARTIAL HYMENECTOMY     • REPLACEMENT TOTAL KNEE BILATERAL Bilateral    • ROTATOR CUFF REPAIR     • SINUS SURGERY       Outpatient Prescriptions Marked as Taking for the 17 encounter (Office Visit) with ELTON Jiang   Medication Sig Dispense Refill   • albuterol (PROVENTIL HFA;VENTOLIN HFA) 108 (90 BASE) MCG/ACT inhaler 2 puffs 4 (Four) Times a Day.     • bisacodyl (DULCOLAX) 5 MG EC tablet Take 25 mg by mouth Every Other Day.     • carboxymethylcellulose (REFRESH PLUS) 0.5 % solution 3 (Three) Times a Day As Needed for Dry Eyes.     • diazePAM (VALIUM) 5 MG tablet Take 5 mg by mouth 3 (Three) Times a Day.     • doxepin (SINEquan) 100 MG capsule Take 100 mg by mouth Every Night.     • escitalopram (LEXAPRO) 20 MG tablet Take 20 mg by mouth daily     • fexofenadine (ALLEGRA) 180 MG tablet Take 180 mg by mouth daily     • glipiZIDE (GLUCOTROL) 10 MG tablet Take 10 mg by mouth 3 (Three) Times a Day.     • hydrOXYzine (VISTARIL) 50 MG capsule Take 50 mg by mouth 2 times daily     • ketoconazole (NIZORAL) 200 MG tablet Take 200 mg by mouth daily     • losartan-hydrochlorothiazide (HYZAAR) 50-12.5 MG per tablet 1 tablet 2 (Two) Times a Day.     • meclizine (ANTIVERT) 12.5 MG tablet Take 12.5 mg by mouth 3 (Three) Times a Day.     • MEGARED OMEGA-3 KRILL  MG capsule 800MG CAPSULE DAILY     • metoprolol tartrate (LOPRESSOR) 25 MG tablet Take 2 tablets by mouth 2 (Two) Times a Day. 180 tablet 3   • Multiple Vitamins-Minerals (CENTRUM ULTRA WOMENS) tablet Take 1 capsule by mouth Daily.     • nystatin (MYCOSTATIN) 603629 UNIT/ML suspension Take 500,000 Units by mouth As Needed.     • probiotic  (CULTURELLE) capsule capsule Take 1 capsule by mouth Daily.     • Sennosides (SENOKOT PO) Take 4 tablets by mouth Every Other Day.     • simvastatin (ZOCOR) 10 MG tablet Take 10 mg by mouth Every Night.     • tiZANidine (ZANAFLEX) 4 MG tablet 4 mg Every 8 (Eight) Hours.     • traMADol (ULTRAM) 50 MG tablet 50 mg 4 (Four) Times a Day.     • venlafaxine XR (EFFEXOR-XR) 150 MG 24 hr capsule Take 300 mg by mouth Daily.       Allergies   Allergen Reactions   • Azelastine-Fluticasone    • Contrast Dye    • Iodides    • Metformin And Related    • Oxycodone-Aspirin    • Pregabalin    • Sulfa Antibiotics    • Nisoldipine Er Rash     Social History     Social History   • Marital status:      Spouse name: N/A   • Number of children: N/A   • Years of education: N/A     Occupational History   • Not on file.     Social History Main Topics   • Smoking status: Never Smoker   • Smokeless tobacco: Never Used   • Alcohol use No   • Drug use: No   • Sexual activity: Defer     Other Topics Concern   • Not on file     Social History Narrative     Family History   Problem Relation Age of Onset   • Heart disease Mother    • Heart disease Father    • Colon cancer Neg Hx    • Colon polyps Neg Hx      Health Maintenance   Topic Date Due   • PNEUMOCOCCAL VACCINE (19-64 MEDIUM RISK) (1 of 1 - PPSV23) 03/13/1977   • TDAP/TD VACCINES (1 - Tdap) 03/13/1977   • HEPATITIS C SCREENING  04/14/2017   • MEDICARE ANNUAL WELLNESS  04/14/2017   • DIABETIC FOOT EXAM  04/14/2017   • MAMMOGRAM  04/14/2017   • PAP SMEAR  04/14/2017   • LIPID PANEL  04/14/2017   • HEMOGLOBIN A1C  04/14/2017   • DIABETIC EYE EXAM  04/14/2017   • URINE MICROALBUMIN  04/14/2017   • COLONOSCOPY  04/14/2017   • INFLUENZA VACCINE  08/01/2017     Review of Systems   Constitutional: Negative for activity change, appetite change, chills, diaphoresis, fatigue, fever and unexpected weight change.   HENT: Positive for trouble swallowing. Negative for ear pain, hearing loss, mouth  "sores, sore throat and voice change.    Eyes: Negative.    Respiratory: Negative for cough, choking, shortness of breath and wheezing.    Cardiovascular: Negative for chest pain and palpitations.   Gastrointestinal: Negative for abdominal pain, blood in stool, constipation, diarrhea, nausea and vomiting.   Endocrine: Negative for cold intolerance and heat intolerance.   Genitourinary: Negative for decreased urine volume, dysuria, frequency, hematuria and urgency.   Musculoskeletal: Negative for back pain, gait problem and myalgias.   Skin: Negative for color change, pallor and rash.   Allergic/Immunologic: Negative for food allergies and immunocompromised state.   Neurological: Negative for dizziness, tremors, seizures, syncope, weakness, light-headedness, numbness and headaches.   Hematological: Negative for adenopathy. Does not bruise/bleed easily.   Psychiatric/Behavioral: Negative for agitation and confusion. The patient is not nervous/anxious.    All other systems reviewed and are negative.    Objective   Vitals:    11/06/17 1250   BP: 120/80   Pulse: 70   SpO2: 98%   Weight: 278 lb (126 kg)   Height: 67\" (170.2 cm)     Body mass index is 43.54 kg/(m^2).  Physical Exam   Constitutional: She is oriented to person, place, and time. She appears well-developed and well-nourished.   HENT:   Head: Normocephalic and atraumatic.   Eyes: Pupils are equal, round, and reactive to light.   Neck: Normal range of motion. Neck supple. No tracheal deviation present.   Cardiovascular: Normal rate, regular rhythm and normal heart sounds.  Exam reveals no gallop and no friction rub.    No murmur heard.  Pulmonary/Chest: Effort normal and breath sounds normal. No respiratory distress. She has no wheezes. She has no rales. She exhibits no tenderness.   Abdominal: Soft. Bowel sounds are normal. She exhibits no distension. There is no hepatosplenomegaly. There is no tenderness. There is no rigidity, no rebound and no guarding. "   Musculoskeletal: Normal range of motion. She exhibits no edema, tenderness or deformity.   Neurological: She is alert and oriented to person, place, and time. She has normal reflexes.   Skin: Skin is warm and dry. No rash noted. No pallor.   Psychiatric: She has a normal mood and affect. Her behavior is normal. Judgment and thought content normal.     Assessment/Plan   Barbara was seen today for gi problem.    Diagnoses and all orders for this visit:    Encounter for screening for malignant neoplasm of colon  -     polyethylene glycol (GoLYTELY) 236 g solution; Take as directed by office instructions.  -     Case Request; Standing  -     Implement Anesthesia Orders Day of Procedure; Standing  -     Obtain Informed Consent; Standing  -     Verify Bowel Prep Was Successful; Standing  -     Case Request    Esophageal dysphagia    Gastroesophageal reflux disease, esophagitis presence not specified    Nonsmoker    Obesity, unspecified obesity severity, unspecified obesity type    Slow transit constipation    I have suggested Zantac 150 mg BID as well as Miralax daily 17 grams up to twice per day.    ESOPHAGOGASTRODUODENOSCOPY WITH ANESTHESIA (N/A), COLONOSCOPY WITH ANESTHESIA (N/A)  EMR Dragon/transcription disclaimer: Much of this encounter note is electronic transcription/translation of spoken language to printed text. The electronic translation of spoken language may be erroneous, or at times, nonsensical words or phrases may be inadvertently transcribed. Although I have reviewed the note for such errors, some may still exist.  Body mass index is 43.54 kg/(m^2).  Return if symptoms worsen or fail to improve.      All risks, benefits, alternatives, and indications of colonoscopy and/or Endoscopy procedure have been discussed with the patient. Risks to include perforation of the colon requiring possible surgery or colostomy, risk of bleeding from biopsies or removal of colon tissue, possibility of missing a colon polyp  or cancer, or adverse drug reaction.  Benefits to include the diagnosis and management of disease of the colon and rectum. Alternatives to include barium enema, radiographic evaluation, lab testing or no intervention. Pt verbalizes understanding and agrees.

## 2017-11-21 NOTE — ANESTHESIA PREPROCEDURE EVALUATION
Anesthesia Evaluation     Patient summary reviewed   history of anesthetic complications: PONV  NPO Solid Status: > 8 hours       Airway   Mallampati: II  TM distance: >3 FB  Neck ROM: full  Dental      Pulmonary    (+) asthma, sleep apnea on CPAP,   Cardiovascular   Exercise tolerance: good (4-7 METS)    Patient on routine beta blocker and Beta blocker given within 24 hours of surgery    (+) hypertension, hyperlipidemia      Neuro/Psych- negative ROS  GI/Hepatic/Renal/Endo    (+) morbid obesity, GERD, renal disease CRI, diabetes mellitus,   (-) liver disease    Musculoskeletal     Abdominal    Substance History      OB/GYN          Other                                        Anesthesia Plan    ASA 3     general     intravenous induction   Anesthetic plan and risks discussed with patient.

## 2017-11-21 NOTE — PLAN OF CARE
Problem: Patient Care Overview (Adult)  Goal: Plan of Care Review  Outcome: Outcome(s) achieved Date Met:  11/21/17    Problem: GI Endoscopy (Adult)  Goal: Signs and Symptoms of Listed Potential Problems Will be Absent or Manageable (GI Endoscopy)  Outcome: Outcome(s) achieved Date Met:  11/21/17

## 2017-11-28 ENCOUNTER — TELEPHONE (OUTPATIENT)
Dept: GASTROENTEROLOGY | Facility: CLINIC | Age: 57
End: 2017-11-28

## 2017-12-01 ENCOUNTER — RESULTS ENCOUNTER (OUTPATIENT)
Dept: BARIATRICS/WEIGHT MGMT | Facility: CLINIC | Age: 57
End: 2017-12-01

## 2017-12-01 DIAGNOSIS — E78.2 MIXED HYPERLIPIDEMIA: ICD-10-CM

## 2017-12-01 DIAGNOSIS — I10 ESSENTIAL HYPERTENSION: ICD-10-CM

## 2017-12-01 DIAGNOSIS — E66.01 OBESITY, CLASS III, BMI 40-49.9 (MORBID OBESITY) (HCC): ICD-10-CM

## 2017-12-01 DIAGNOSIS — E11.9 TYPE 2 DIABETES MELLITUS WITHOUT COMPLICATION, WITHOUT LONG-TERM CURRENT USE OF INSULIN (HCC): ICD-10-CM

## 2017-12-21 ENCOUNTER — OFFICE VISIT (OUTPATIENT)
Dept: BARIATRICS/WEIGHT MGMT | Facility: CLINIC | Age: 57
End: 2017-12-21

## 2017-12-21 VITALS
HEIGHT: 66 IN | TEMPERATURE: 97.7 F | WEIGHT: 282.6 LBS | SYSTOLIC BLOOD PRESSURE: 144 MMHG | BODY MASS INDEX: 45.42 KG/M2 | DIASTOLIC BLOOD PRESSURE: 72 MMHG | OXYGEN SATURATION: 97 % | HEART RATE: 67 BPM

## 2017-12-21 DIAGNOSIS — E66.01 MORBID OBESITY WITH BMI OF 45.0-49.9, ADULT (HCC): Primary | ICD-10-CM

## 2017-12-21 PROCEDURE — 99212 OFFICE O/P EST SF 10 MIN: CPT | Performed by: NURSE PRACTITIONER

## 2017-12-21 RX ORDER — AMLODIPINE BESYLATE 10 MG/1
10 TABLET ORAL DAILY
COMMUNITY

## 2017-12-21 RX ORDER — GLIMEPIRIDE 4 MG/1
4 TABLET ORAL
COMMUNITY
End: 2018-03-20

## 2017-12-21 NOTE — PATIENT INSTRUCTIONS
Barbara Tapia has done well  this month with healthy changes. Patient has lost one pound.  Today we discussed healthy changes in lifestyle, diet, and exercise.   Handout provided on portion sizes/control/reading nutrition labels.   Intensive behavioral therapy for obesity was done today.   Goals for this month are: 3 meals per day with protein at each; eat protein first, use smaller plate; exercise as advised and as able.   Follow up in one month for a weight recheck.

## 2017-12-21 NOTE — PROGRESS NOTES
"Subjective   Barbara Tapia is a 57 y.o. female.     History of Present Illness   Barbara Tapia is here with morbid obesity and desires to have medically supervised weight loss. She may want surgery at some point.  This is the patient's 2nd visit to the office. Patient has been sick for the past two weeks with the flu and pneumonia. She has had a hard time getting over that acute illness.  Patient has been exercising by chair exercising 30 minutes daily. Patient has been making health dietary choices and eating 3 meals per day with protein at each. Patient has been getting protein in her diet.  Patient is drinking 64 ounces of water per day.   Vitals:    12/21/17 0931   BP: 144/72   BP Location: Right arm   Patient Position: Sitting   Cuff Size: Adult   Pulse: 67   Temp: 97.7 °F (36.5 °C)   SpO2: 97%   Weight: 128 kg (282 lb 9.6 oz)   Height: 167.6 cm (66\")         The following portions of the patient's history were reviewed and updated as appropriate: allergies, current medications, past family history, past medical history, past social history, past surgical history and problem list.    Review of Systems   Constitutional: Positive for fatigue and unexpected weight change. Negative for activity change and appetite change.   HENT: Negative.    Eyes: Negative.         Wears glasses   Respiratory: Positive for shortness of breath. Negative for apnea, cough and chest tightness.    Cardiovascular: Positive for palpitations and leg swelling. Negative for chest pain.   Gastrointestinal: Positive for constipation. Negative for abdominal pain, anal bleeding, nausea and vomiting.   Endocrine: Negative.         High blood sugar   Genitourinary: Negative.         Stress incontinence   Musculoskeletal: Positive for arthralgias, back pain, gait problem, myalgias and neck pain.   Skin: Positive for rash.        Rash in skin folds   Allergic/Immunologic: Negative.    Neurological: Positive for numbness. Negative for seizures and " syncope.   Hematological: Bruises/bleeds easily.   Psychiatric/Behavioral: Positive for dysphoric mood and sleep disturbance. Negative for self-injury and suicidal ideas. The patient is nervous/anxious.        Objective   Physical Exam   Constitutional: She is oriented to person, place, and time. Vital signs are normal. She appears well-developed and well-nourished. She is cooperative. No distress.   HENT:   Head: Normocephalic and atraumatic.   Nose: Nose normal.   Mouth/Throat: Oropharynx is clear and moist. No oropharyngeal exudate or tonsillar abscesses.   Eyes: Conjunctivae, EOM and lids are normal. Pupils are equal, round, and reactive to light. Right eye exhibits no discharge. Left eye exhibits no discharge.   Neck: Trachea normal. Neck supple. No JVD present. Carotid bruit is not present. No rigidity. No tracheal deviation present. No thyromegaly present.   Cardiovascular: Normal rate, regular rhythm, S1 normal, S2 normal and normal heart sounds.    Pulmonary/Chest: Effort normal and breath sounds normal. No stridor. No respiratory distress. She has no wheezes. She has no rales.   Abdominal: Soft. Bowel sounds are normal. She exhibits no distension. There is no tenderness.   obese   Musculoskeletal: She exhibits edema.        Right shoulder: She exhibits normal strength.   Left ankle swollen (chronic)   Lymphadenopathy:     She has no cervical adenopathy.   Neurological: She is alert and oriented to person, place, and time. She has normal strength. No cranial nerve deficit.   Skin: Skin is warm, dry and intact. No rash noted.   Psychiatric: She has a normal mood and affect. Her speech is normal and behavior is normal.   Alert and oriented x 3   Vitals reviewed.      Assessment/Plan   Barbara was seen today for follow-up, obesity, nutrition counseling and weight loss.    Diagnoses and all orders for this visit:    Morbid obesity with BMI of 45.0-49.9, adult              Barbara Tapia has done well  this month  with healthy changes. Patient has lost one pound.  Today we discussed healthy changes in lifestyle, diet, and exercise.   Handout provided on portion sizes/control/reading nutrition labels.   Intensive behavioral therapy for obesity was done today.   Goals for this month are: 3 meals per day with protein at each; eat protein first, use smaller plate; exercise as advised and as able.   Follow up in one month for a weight recheck.

## 2017-12-29 ENCOUNTER — RESULTS ENCOUNTER (OUTPATIENT)
Dept: BARIATRICS/WEIGHT MGMT | Facility: CLINIC | Age: 57
End: 2017-12-29

## 2017-12-29 DIAGNOSIS — E66.01 OBESITY, CLASS III, BMI 40-49.9 (MORBID OBESITY) (HCC): ICD-10-CM

## 2017-12-29 DIAGNOSIS — E78.2 MIXED HYPERLIPIDEMIA: ICD-10-CM

## 2017-12-29 DIAGNOSIS — I10 ESSENTIAL HYPERTENSION: ICD-10-CM

## 2017-12-29 DIAGNOSIS — E11.9 TYPE 2 DIABETES MELLITUS WITHOUT COMPLICATION, WITHOUT LONG-TERM CURRENT USE OF INSULIN (HCC): ICD-10-CM

## 2018-01-22 ENCOUNTER — OFFICE VISIT (OUTPATIENT)
Dept: BARIATRICS/WEIGHT MGMT | Facility: CLINIC | Age: 58
End: 2018-01-22

## 2018-01-22 VITALS
HEART RATE: 67 BPM | DIASTOLIC BLOOD PRESSURE: 74 MMHG | HEIGHT: 66 IN | TEMPERATURE: 97.5 F | BODY MASS INDEX: 45.38 KG/M2 | WEIGHT: 282.4 LBS | OXYGEN SATURATION: 97 % | SYSTOLIC BLOOD PRESSURE: 154 MMHG

## 2018-01-22 DIAGNOSIS — E66.01 OBESITY, CLASS III, BMI 40-49.9 (MORBID OBESITY) (HCC): Primary | ICD-10-CM

## 2018-01-22 PROCEDURE — 99212 OFFICE O/P EST SF 10 MIN: CPT | Performed by: NURSE PRACTITIONER

## 2018-01-22 RX ORDER — DULOXETIN HYDROCHLORIDE 60 MG/1
60 CAPSULE, DELAYED RELEASE ORAL 2 TIMES DAILY
COMMUNITY

## 2018-01-22 NOTE — PROGRESS NOTES
"Subjective   Barbara Tapia is a 57 y.o. female.     History of Present Illness   Barbara Tapia is here with morbid obesity and desires to have medically supervised weight loss. This is the patient's 3rd  visit to the office. Patient has been exercising by working her arms and legs and walking twice per day and for 30 minutes.  Patient has been making health dietary choices and eating 2 meals per day with protein at each. Patient has been getting protein in her diet, but is unsure of protein grams. Patient is drinking 36-40  ounces of water per day. She has been struggling with her blood sugars running in the 300's.   Vitals:    01/22/18 0919   BP: 154/74   BP Location: Right arm   Patient Position: Sitting   Cuff Size: Adult   Pulse: 67   Temp: 97.5 °F (36.4 °C)   SpO2: 97%   Weight: 128 kg (282 lb 6.4 oz)   Height: 167.6 cm (66\")         The following portions of the patient's history were reviewed and updated as appropriate: allergies, current medications, past family history, past medical history, past social history, past surgical history and problem list.    Review of Systems   Constitutional: Positive for fatigue. Negative for activity change and appetite change.   Eyes: Negative.         Wears glasses   Respiratory: Positive for apnea and shortness of breath. Negative for cough and chest tightness.         Wears BiPap   Cardiovascular: Positive for chest pain and leg swelling. Negative for palpitations.        Chest pain with activity, has had cardiac work up   Gastrointestinal: Negative.  Negative for abdominal pain, anal bleeding, constipation, nausea and vomiting.   Endocrine: Negative.         High blood sugar    Genitourinary: Negative.    Musculoskeletal: Positive for arthralgias, back pain, gait problem, myalgias and neck pain.   Skin: Negative.    Allergic/Immunologic: Negative.    Neurological: Positive for numbness and headaches. Negative for seizures and syncope.   Hematological: Bruises/bleeds " easily.   Psychiatric/Behavioral: Positive for dysphoric mood and sleep disturbance. Negative for self-injury and suicidal ideas. The patient is nervous/anxious.        Objective   Physical Exam   Constitutional: She is oriented to person, place, and time. Vital signs are normal. She appears well-developed and well-nourished. She is cooperative. No distress.   HENT:   Head: Normocephalic and atraumatic.   Nose: Nose normal.   Mouth/Throat: Oropharynx is clear and moist. No oropharyngeal exudate or tonsillar abscesses.   Eyes: Conjunctivae, EOM and lids are normal. Pupils are equal, round, and reactive to light. Right eye exhibits no discharge. Left eye exhibits no discharge.   Glasses noted   Neck: Trachea normal. Neck supple. No JVD present. Carotid bruit is not present. No rigidity. No tracheal deviation present. No thyromegaly present.   Cardiovascular: Normal rate, regular rhythm, S1 normal, S2 normal and normal heart sounds.    Pulmonary/Chest: Effort normal and breath sounds normal. No stridor. No respiratory distress. She has no wheezes. She has no rales.   Abdominal: Soft. Bowel sounds are normal. She exhibits no distension. There is no tenderness.   obese   Musculoskeletal: She exhibits edema.        Right shoulder: She exhibits normal strength.   2+ edema to left ankle (chronic)   Lymphadenopathy:     She has no cervical adenopathy.   Neurological: She is alert and oriented to person, place, and time. She has normal strength. No cranial nerve deficit.   Skin: Skin is warm, dry and intact. No rash noted.   Psychiatric: She has a normal mood and affect. Her speech is normal and behavior is normal.   Alert and oriented x 3   Vitals reviewed.      Assessment/Plan   Barbara was seen today for follow-up, obesity and nutrition counseling.    Diagnoses and all orders for this visit:    Obesity, Class III, BMI 40-49.9 (morbid obesity)          Barbara Tapia has struggled some this month with healthy changes. Patient  had gained ten pounds over the holidays, but has lost back down to where she was at last visit.   Today we discussed healthy changes in lifestyle, diet, and exercise.   Handout provided on exercise and stretch band provided.  Handout provided on perfect protein.   Premier Shake sample provided.  Use My Fitness Pal miguel angel to help keep food journal, etc.   Intensive behavioral therapy for obesity was done today.   Goals for this month are: 3 meals per day with protein at each; eat protein first, use smaller plate; exercise as advised; increase water intake.   Follow up in one month for a weight recheck.

## 2018-01-22 NOTE — PATIENT INSTRUCTIONS
Barbara Tapia has struggled some this month with healthy changes. Patient had gained ten pounds over the holidays, but has lost back down to where she was at last visit.   Today we discussed healthy changes in lifestyle, diet, and exercise.   Handout provided on exercise and stretch band provided.  Handout provided on perfect protein.   Premier Shake sample provided.  Use My Fitness Pal miguel angel to help keep food journal, etc.   Intensive behavioral therapy for obesity was done today.   Goals for this month are: 3 meals per day with protein at each; eat protein first, use smaller plate; exercise as advised; increase water intake.   Follow up in one month for a weight recheck.

## 2018-01-26 ENCOUNTER — RESULTS ENCOUNTER (OUTPATIENT)
Dept: BARIATRICS/WEIGHT MGMT | Facility: CLINIC | Age: 58
End: 2018-01-26

## 2018-01-26 DIAGNOSIS — I10 ESSENTIAL HYPERTENSION: ICD-10-CM

## 2018-01-26 DIAGNOSIS — E78.2 MIXED HYPERLIPIDEMIA: ICD-10-CM

## 2018-01-26 DIAGNOSIS — E11.9 TYPE 2 DIABETES MELLITUS WITHOUT COMPLICATION, WITHOUT LONG-TERM CURRENT USE OF INSULIN (HCC): ICD-10-CM

## 2018-01-26 DIAGNOSIS — E66.01 OBESITY, CLASS III, BMI 40-49.9 (MORBID OBESITY) (HCC): ICD-10-CM

## 2018-02-20 ENCOUNTER — OFFICE VISIT (OUTPATIENT)
Dept: BARIATRICS/WEIGHT MGMT | Facility: CLINIC | Age: 58
End: 2018-02-20

## 2018-02-20 VITALS
HEIGHT: 66 IN | TEMPERATURE: 98 F | BODY MASS INDEX: 45.61 KG/M2 | DIASTOLIC BLOOD PRESSURE: 77 MMHG | HEART RATE: 64 BPM | WEIGHT: 283.8 LBS | SYSTOLIC BLOOD PRESSURE: 142 MMHG | OXYGEN SATURATION: 97 %

## 2018-02-20 DIAGNOSIS — E66.01 MORBID OBESITY WITH BMI OF 45.0-49.9, ADULT (HCC): Primary | ICD-10-CM

## 2018-02-20 PROCEDURE — 99212 OFFICE O/P EST SF 10 MIN: CPT | Performed by: NURSE PRACTITIONER

## 2018-02-20 NOTE — PATIENT INSTRUCTIONS
Barbara Tapia has done okay this month with healthy changes. Patient has gained one pound. Today we discussed healthy changes in lifestyle, diet, and exercise.   Handout provided on perfect protein.  May use My Sport/Life Pal miguel angel to help journal foods and exercise (patient states she may not be able to figure out how to use this miguel angel).   Intensive behavioral therapy for obesity was done today.   Goals for this month are: 3 meals per day with protein at each; eat protein first, use smaller plate; exercise as advised and as able; continue to drink Premier Protein shakes.   Follow up in one month and see Dr. Conrad as she has been coming for four months and has not been able to lose any weight . Dr. Conrad to discuss disease of obesity and surgery as a possible tool to help her lose weight and greatly reduce her comorbid conditions (DM, HTN, hyperlipidemia, BLADIMIR).

## 2018-02-20 NOTE — PROGRESS NOTES
"Subjective   Barbara Tapia is a 57 y.o. female.     History of Present Illness   Barbara Tapia is here with morbid obesity and desires to have medically supervised weight loss. She may contemplate surgery at some point. This is the patient's 4th visit to the office. Patient's blood sugar has been running high (300's) and her PCP is in process of getting her on a new medication that her insurance will cover.  Patient has been exercising by doing bed and chair exercises for 20 minutes and twice daily. She can walk for only short distances. Patient has been making health dietary choices and eating 3 meals per day with protein at each. Patient is drinking 64 ounces of water per day. She is drinking one sprite zero per day.   Vitals:    02/20/18 0909   BP: 142/77   BP Location: Right arm   Patient Position: Sitting   Cuff Size: Adult   Pulse: 64   Temp: 98 °F (36.7 °C)   SpO2: 97%   Weight: 129 kg (283 lb 12.8 oz)   Height: 167.6 cm (66\")         The following portions of the patient's history were reviewed and updated as appropriate: allergies, current medications, past family history, past medical history, past social history, past surgical history and problem list.    Review of Systems   Constitutional: Positive for activity change, fatigue and unexpected weight change. Negative for appetite change.   Eyes: Negative.    Respiratory: Positive for shortness of breath. Negative for apnea, cough and chest tightness.         Snoring   Cardiovascular: Positive for chest pain and leg swelling. Negative for palpitations.   Gastrointestinal: Positive for nausea. Negative for abdominal pain, anal bleeding, constipation and vomiting.   Endocrine: Negative.         Blood sugar runs 200-300's   Genitourinary: Negative.    Musculoskeletal: Positive for arthralgias, back pain, gait problem, myalgias and neck pain.   Skin: Negative.    Allergic/Immunologic: Negative.    Neurological: Positive for headaches. Negative for seizures and " syncope.   Hematological: Bruises/bleeds easily.   Psychiatric/Behavioral: Positive for dysphoric mood and sleep disturbance. Negative for self-injury and suicidal ideas. The patient is nervous/anxious.         No suicidal thoughts today; she recently had Cymbalta increased        Objective   Physical Exam   Constitutional: She is oriented to person, place, and time. Vital signs are normal. She appears well-developed and well-nourished. She is cooperative. No distress.   HENT:   Head: Normocephalic and atraumatic.   Nose: Nose normal.   Mouth/Throat: Oropharynx is clear and moist. No oropharyngeal exudate or tonsillar abscesses.   Eyes: Conjunctivae, EOM and lids are normal. Pupils are equal, round, and reactive to light. Right eye exhibits no discharge. Left eye exhibits no discharge.   Glasses noted   Neck: Trachea normal. Neck supple. No JVD present. Carotid bruit is not present. No rigidity. No tracheal deviation present. No thyromegaly present.   Cardiovascular: Normal rate, regular rhythm, S1 normal, S2 normal and normal heart sounds.    Pulmonary/Chest: Effort normal and breath sounds normal. No stridor. No respiratory distress. She has no wheezes. She has no rales.   Abdominal: Soft. Bowel sounds are normal. She exhibits no distension. There is no tenderness.   obese   Musculoskeletal: She exhibits edema.        Right shoulder: She exhibits normal strength.   2+ edema to left ankle (chronic)   Lymphadenopathy:     She has no cervical adenopathy.   Neurological: She is alert and oriented to person, place, and time. She has normal strength. No cranial nerve deficit.   Skin: Skin is warm, dry and intact. No rash noted.   Psychiatric: She has a normal mood and affect. Her speech is normal and behavior is normal.   Alert and oriented x 3   Vitals reviewed.      Assessment/Plan   Barbara was seen today for follow-up, obesity, nutrition counseling and weight loss.    Diagnoses and all orders for this visit:    Morbid  obesity with BMI of 45.0-49.9, adult          Barbara Tapia has done okay this month with healthy changes. Patient has gained one pound. Today we discussed healthy changes in lifestyle, diet, and exercise.   Handout provided on perfect protein.  May use [employer] miguel angel to help journal foods and exercise (patient states she may not be able to figure out how to use this miguel angel).   Intensive behavioral therapy for obesity was done today.   Goals for this month are: 3 meals per day with protein at each; eat protein first, use smaller plate; exercise as advised and as able; continue to drink Premier Protein shakes.   Follow up in one month and see Dr. Conrad as she has been coming for four months and has not been able to lose any weight . Dr. Conrad to discuss disease of obesity and surgery as a possible tool to help her lose weight and greatly reduce her comorbid conditions (DM, HTN, hyperlipidemia, BLADIMIR).

## 2018-02-23 ENCOUNTER — RESULTS ENCOUNTER (OUTPATIENT)
Dept: BARIATRICS/WEIGHT MGMT | Facility: CLINIC | Age: 58
End: 2018-02-23

## 2018-02-23 DIAGNOSIS — E66.01 OBESITY, CLASS III, BMI 40-49.9 (MORBID OBESITY) (HCC): ICD-10-CM

## 2018-02-23 DIAGNOSIS — E11.9 TYPE 2 DIABETES MELLITUS WITHOUT COMPLICATION, WITHOUT LONG-TERM CURRENT USE OF INSULIN (HCC): ICD-10-CM

## 2018-02-23 DIAGNOSIS — E78.2 MIXED HYPERLIPIDEMIA: ICD-10-CM

## 2018-02-23 DIAGNOSIS — I10 ESSENTIAL HYPERTENSION: ICD-10-CM

## 2018-03-20 ENCOUNTER — OFFICE VISIT (OUTPATIENT)
Dept: BARIATRICS/WEIGHT MGMT | Facility: CLINIC | Age: 58
End: 2018-03-20

## 2018-03-20 VITALS
HEIGHT: 66 IN | OXYGEN SATURATION: 95 % | HEART RATE: 65 BPM | SYSTOLIC BLOOD PRESSURE: 145 MMHG | DIASTOLIC BLOOD PRESSURE: 71 MMHG | WEIGHT: 275.6 LBS | BODY MASS INDEX: 44.29 KG/M2 | TEMPERATURE: 97.6 F

## 2018-03-20 DIAGNOSIS — K21.9 GASTROESOPHAGEAL REFLUX DISEASE WITHOUT ESOPHAGITIS: ICD-10-CM

## 2018-03-20 DIAGNOSIS — G47.33 OBSTRUCTIVE SLEEP APNEA SYNDROME: ICD-10-CM

## 2018-03-20 DIAGNOSIS — E66.01 OBESITY, CLASS III, BMI 40-49.9 (MORBID OBESITY) (HCC): Primary | ICD-10-CM

## 2018-03-20 DIAGNOSIS — E11.9 TYPE 2 DIABETES MELLITUS WITHOUT COMPLICATION, WITHOUT LONG-TERM CURRENT USE OF INSULIN (HCC): ICD-10-CM

## 2018-03-20 PROBLEM — E66.813 OBESITY, CLASS III, BMI 40-49.9 (MORBID OBESITY): Status: ACTIVE | Noted: 2018-03-20

## 2018-03-20 PROCEDURE — 99214 OFFICE O/P EST MOD 30 MIN: CPT | Performed by: SURGERY

## 2018-03-20 RX ORDER — INSULIN GLARGINE 100 [IU]/ML
55 INJECTION, SOLUTION SUBCUTANEOUS 2 TIMES DAILY
COMMUNITY

## 2018-03-20 NOTE — PROGRESS NOTES
Patient Care Team:  Briana Bartlett MD as PCP - General (Family Medicine)  Shane Singh MD as Referring Physician (Cardiology)  Briana Bartlett MD as Referring Physician (Family Medicine)    Reason for Visit:  Surgical Weight loss    Subjective     Patient is a 58 y.o. female presents with morbid obesity and her Body mass index is 44.48 kg/m².     She is here for discussion of weight loss treatment options.  She stated she has been with the disease of obesity for year(s).  She stated she suffers from new onset of diabetes requiring insulin, sleep apnea requiring a BiPAP machine, arthritic problems, chronic back pain and chronic kidney disease due to her weight gain.  She stated that weight loss helps alleviate these symptoms.   She stated that she has tried multiple diet regimens including currently participating in a medically supervised weight loss program to help with weight loss.  She stated that she has attempted these conservative methods for weight loss without maintaining long term success.  Today she would like to discuss surgical weight loss options such as the Laparoscopic Sleeve Gastrectomy or the Laparoscopic R - Y Gastric Bypass.     Review of Systems  General ROS: positive for  - fatigue, sleep disturbance and weight gain  Psychological ROS: positive for - anxiety and depression  ENT ROS: positive for - uses and wears glasses  Endocrine ROS: negative for - mood swings, palpitations, polydipsia/polyuria or temperature intolerance  Respiratory ROS: no cough, shortness of breath, or wheezing  Cardiovascular ROS: positive for - dyspnea on exertion, edema and shortness of breath  Gastrointestinal ROS: no abdominal pain, change in bowel habits, or black or bloody stools  positive for - constipation  Musculoskeletal ROS: positive for - joint pain and pain in back - lower and neck - lower  Neurological ROS: no TIA or stroke symptoms    History  Past Medical History:   Diagnosis Date   • Anxiety    •  Arthritis    • Asthma    • Back pain    • Chest pain    • Chronic fatigue    • Chronic kidney disease     Kidneys are only functioning 50 %   • Diabetes    • Diabetes mellitus    • Fibromyalgia    • Hiatal hernia    • Hypertension    • Knee contracture     total   • PONV (postoperative nausea and vomiting)    • Sleep apnea    • Sleep apnea     BiPAP     Past Surgical History:   Procedure Laterality Date   • ANKLE SURGERY Left     multiple surgeries   • CARDIAC CATHETERIZATION Left 2017    Procedure: Cardiac Catheterization/Vascular Study;  Surgeon: Shane Singh MD;  Location:  [facility];  Service:    • CARDIAC CATHETERIZATION N/A 2017    Procedure: Coronary angiography;  Surgeon: Shane Singh MD;  Location:  [facility];  Service:    • CARDIAC CATHETERIZATION N/A 2017    Procedure: Left Heart Cath;  Surgeon: Shane Singh MD;  Location:  [facility];  Service:    • CERVICAL FUSION      STITCHED UP TO PREVENT LOSING BABY   • CERVIX LESION DESTRUCTION     •  SECTION     • COLONOSCOPY     • COLONOSCOPY N/A 2017    Procedure: COLONOSCOPY WITH ANESTHESIA;  Surgeon: Raffaele Buchanan MD;  Location: [facility] ENDOSCOPY;  Service:    • ELBOW PROCEDURE Left     screws placed x 2   • ENDOSCOPY N/A 2017    Procedure: ESOPHAGOGASTRODUODENOSCOPY WITH ANESTHESIA;  Surgeon: Raffaele Buchanan MD;  Location: [facility] ENDOSCOPY;  Service:    • EXCISION MASS TRUNK      LEFT SIDE CAT SCRATCH FEVER    • PARTIAL HYSTERECTOMY      ovaries still intact   • REPLACEMENT TOTAL KNEE BILATERAL Bilateral    • ROTATOR CUFF REPAIR     • SINUS SURGERY     • TENNIS ELBOW RELEASE Right      Family History   Problem Relation Age of Onset   • Heart disease Mother    • Obesity Mother    • Diabetes Mother    • Hypertension Mother    • Stroke Mother    • Heart disease Father    • Stroke Brother    • Heart disease Brother    • Colon cancer Neg Hx    • Colon polyps Neg Hx       Social History   Substance Use Topics   • Smoking status: Never Smoker   • Smokeless tobacco: Never Used   • Alcohol use No       (Not in a hospital admission)  Allergies:  Azelastine-fluticasone; Contrast dye; Iodides; Metformin and related; Oxycodone-aspirin; Pregabalin; Sulfa antibiotics; and Nisoldipine er      Current Outpatient Prescriptions:   •  albuterol (PROVENTIL HFA;VENTOLIN HFA) 108 (90 BASE) MCG/ACT inhaler, 2 puffs 4 (Four) Times a Day., Disp: , Rfl:   •  amLODIPine (NORVASC) 10 MG tablet, Take 10 mg by mouth Daily., Disp: , Rfl:   •  bisacodyl (DULCOLAX) 5 MG EC tablet, Take 25 mg by mouth Every Other Day., Disp: , Rfl:   •  carboxymethylcellulose (REFRESH PLUS) 0.5 % solution, 3 (Three) Times a Day As Needed for Dry Eyes., Disp: , Rfl:   •  diazePAM (VALIUM) 5 MG tablet, Take 5 mg by mouth 3 (Three) Times a Day., Disp: , Rfl:   •  doxepin (SINEquan) 100 MG capsule, Take 100 mg by mouth Every Night., Disp: , Rfl:   •  DULoxetine (CYMBALTA) 60 MG capsule, Take 60 mg by mouth Daily., Disp: , Rfl:   •  fexofenadine (ALLEGRA) 180 MG tablet, Take 180 mg by mouth daily, Disp: , Rfl:   •  hydrOXYzine (VISTARIL) 50 MG capsule, Take 50 mg by mouth 2 times daily, Disp: , Rfl:   •  insulin glargine (LANTUS) 100 UNIT/ML injection, Inject  under the skin Daily., Disp: , Rfl:   •  ketoconazole (NIZORAL) 200 MG tablet, Take 200 mg by mouth daily, Disp: , Rfl:   •  losartan-hydrochlorothiazide (HYZAAR) 50-12.5 MG per tablet, 1 tablet 2 (Two) Times a Day., Disp: , Rfl:   •  meclizine (ANTIVERT) 12.5 MG tablet, Take 12.5 mg by mouth 3 (Three) Times a Day., Disp: , Rfl:   •  MEGARED OMEGA-3 KRILL  MG capsule, 800MG CAPSULE DAILY, Disp: , Rfl:   •  metoprolol tartrate (LOPRESSOR) 25 MG tablet, Take 2 tablets by mouth Every 12 (Twelve) Hours., Disp: 180 tablet, Rfl: 3  •  Multiple Vitamins-Minerals (CENTRUM ULTRA WOMENS) tablet, Take 1 capsule by mouth Daily., Disp: , Rfl:   •  nystatin (MYCOSTATIN) 289864  UNIT/ML suspension, Take 500,000 Units by mouth As Needed., Disp: , Rfl:   •  probiotic (CULTURELLE) capsule capsule, Take 1 capsule by mouth Daily., Disp: , Rfl:   •  Sennosides (SENOKOT PO), Take 4 tablets by mouth Every Other Day., Disp: , Rfl:   •  simvastatin (ZOCOR) 10 MG tablet, Take 10 mg by mouth Every Night., Disp: , Rfl:   •  tiZANidine (ZANAFLEX) 4 MG tablet, 4 mg Every 8 (Eight) Hours., Disp: , Rfl:   •  traMADol (ULTRAM) 50 MG tablet, 50 mg 4 (Four) Times a Day., Disp: , Rfl:     Objective     Vital Signs  Temp:  [97.6 °F (36.4 °C)] 97.6 °F (36.4 °C)  Heart Rate:  [65] 65  BP: (145)/(71) 145/71  Body mass index is 44.48 kg/m².  1    03/20/18  0907   Weight: 125 kg (275 lb 9.6 oz)       Physical Exam:     HEENT: extra ocular movement intact  Respiratory: appears well, vitals normal, no respiratory distress, acyanotic, normal RR, chest clear, no wheezing, crepitations, rhonchi, normal symmetric air entry  Cardiovascular: Regular rate and rhythm, S1, S2 normal, no murmur, click, rub or gallop  GI: Soft, non-tender, normal bowel sounds; no bruits, organomegaly or masses.  Abnormal shape: obese  Musculoskeletal: inspection - no abnormality  Neurologic: alert, oriented, normal speech, no focal findings or movement disorder noted       Results Review:   None        Assessment/Plan   Encounter Diagnoses   Name Primary?   • Obesity, Class III, BMI 40-49.9 (morbid obesity) Yes   • Gastroesophageal reflux disease without esophagitis    • Type 2 diabetes mellitus without complication, without long-term current use of insulin    • Obstructive sleep apnea syndrome        I believe this patient will be a good candidate for weight loss surgery.      A total of 25 minutes was spent face-to-face with the patient and over half of that time was in discussion and coordination of treatment options and care for the disease of morbid obesity.  I explained that she is a good candidate for weight loss surgery.  I have discussed  the [place] - Y Gastric Bypass, laparoscopic sleeve gastrectomy and the Laparoscopic Gastric Band procedures to provide the alternatives which includes non surgical weight loss options as well.  We discussed the benefits of the surgeries including the benefit of weight loss and the possible reversal of co-morbid conditions associated with morbid obesity.  She is aware that the Sleeve procedure is not reversible.  She is aware that the bypass procedure will cause malabsorption of vitamins and minerals.  We discussed the complications and risks which include the risk of perforation, leakage,bleeding, intra-abdominal organ injury, stenosis or ulcerations, obstruction of the intestines,  the risk of deep vein thrombosis formation leading to possible pulmonary embolisms, and the risk of death.  I explained to the patient the possibility that this procedure may not be performed laparoscopically and may require being converted to an opened procedure or aborted due to abnormal anatomy.  Also postoperatively there is a risk of increased GERD symptoms after this procedure.   Upon completion of our discussion and addressing and answering her questions as well as her 's they would like to further discuss surgery as an option.  I agree with this decision and explained that this is an elective procedure and she can take her time making this decision.  She will follow up with our program in one months time as scheduled.      I discussed the patients findings and my recommendations with patient and .     Dr. Dimitris Conrad MD [facility]    03/20/18  12:44 PM  Patient Care Team:  Briana Bartlett MD as PCP - General (Family Medicine)  Shane Singh MD as Referring Physician (Cardiology)  Briana Bartlett MD as Referring Physician (Family Medicine)

## 2018-03-23 ENCOUNTER — RESULTS ENCOUNTER (OUTPATIENT)
Dept: BARIATRICS/WEIGHT MGMT | Facility: CLINIC | Age: 58
End: 2018-03-23

## 2018-03-23 DIAGNOSIS — E66.01 OBESITY, CLASS III, BMI 40-49.9 (MORBID OBESITY) (HCC): ICD-10-CM

## 2018-03-23 DIAGNOSIS — E11.9 TYPE 2 DIABETES MELLITUS WITHOUT COMPLICATION, WITHOUT LONG-TERM CURRENT USE OF INSULIN (HCC): ICD-10-CM

## 2018-03-23 DIAGNOSIS — E78.2 MIXED HYPERLIPIDEMIA: ICD-10-CM

## 2018-03-23 DIAGNOSIS — I10 ESSENTIAL HYPERTENSION: ICD-10-CM

## 2018-04-18 ENCOUNTER — OFFICE VISIT (OUTPATIENT)
Dept: CARDIOLOGY | Facility: CLINIC | Age: 58
End: 2018-04-18

## 2018-04-18 ENCOUNTER — TELEPHONE (OUTPATIENT)
Dept: BARIATRICS/WEIGHT MGMT | Facility: CLINIC | Age: 58
End: 2018-04-18

## 2018-04-18 ENCOUNTER — OFFICE VISIT (OUTPATIENT)
Dept: BARIATRICS/WEIGHT MGMT | Facility: CLINIC | Age: 58
End: 2018-04-18

## 2018-04-18 VITALS
WEIGHT: 281.2 LBS | OXYGEN SATURATION: 97 % | DIASTOLIC BLOOD PRESSURE: 73 MMHG | HEIGHT: 66 IN | SYSTOLIC BLOOD PRESSURE: 137 MMHG | TEMPERATURE: 98 F | HEART RATE: 62 BPM | BODY MASS INDEX: 45.19 KG/M2

## 2018-04-18 VITALS
HEIGHT: 66 IN | OXYGEN SATURATION: 95 % | DIASTOLIC BLOOD PRESSURE: 72 MMHG | BODY MASS INDEX: 41.95 KG/M2 | HEART RATE: 63 BPM | WEIGHT: 261 LBS | SYSTOLIC BLOOD PRESSURE: 122 MMHG

## 2018-04-18 DIAGNOSIS — L84 CALLUS OF FOOT: ICD-10-CM

## 2018-04-18 DIAGNOSIS — E66.01 MORBID OBESITY DUE TO EXCESS CALORIES (HCC): ICD-10-CM

## 2018-04-18 DIAGNOSIS — R13.19 ESOPHAGEAL DYSPHAGIA: ICD-10-CM

## 2018-04-18 DIAGNOSIS — E66.01 OBESITY, CLASS III, BMI 40-49.9 (MORBID OBESITY) (HCC): Primary | ICD-10-CM

## 2018-04-18 DIAGNOSIS — E11.9 TYPE 2 DIABETES MELLITUS WITHOUT COMPLICATION, WITHOUT LONG-TERM CURRENT USE OF INSULIN (HCC): ICD-10-CM

## 2018-04-18 DIAGNOSIS — E78.2 MIXED HYPERLIPIDEMIA: ICD-10-CM

## 2018-04-18 DIAGNOSIS — M79.672 LEFT FOOT PAIN: ICD-10-CM

## 2018-04-18 DIAGNOSIS — I10 ESSENTIAL HYPERTENSION: ICD-10-CM

## 2018-04-18 DIAGNOSIS — R00.2 PALPITATION: Primary | ICD-10-CM

## 2018-04-18 DIAGNOSIS — G47.33 OSA TREATED WITH BIPAP: ICD-10-CM

## 2018-04-18 PROCEDURE — 99214 OFFICE O/P EST MOD 30 MIN: CPT | Performed by: INTERNAL MEDICINE

## 2018-04-18 PROCEDURE — 93000 ELECTROCARDIOGRAM COMPLETE: CPT | Performed by: INTERNAL MEDICINE

## 2018-04-18 PROCEDURE — 99213 OFFICE O/P EST LOW 20 MIN: CPT | Performed by: NURSE PRACTITIONER

## 2018-04-18 RX ORDER — GLIPIZIDE 10 MG/1
10 TABLET ORAL
COMMUNITY

## 2018-04-18 NOTE — PROGRESS NOTES
"Subjective   Barbara Tapia is a 58 y.o. female.     History of Present Illness   Barbara Tapia is here with morbid obesity and at this point she would like to continue to be seen for medically supervised weight loss. Patient and her  met with Dr. Conrad at her last visit. This is the patient's 6th visit to the office.  Patient has been exercising by doing bed and chair exercises three times a week and for 20 minutes. Patient has been making health dietary choices and eating 3 meals per day with protein at each. Patient has been eating 50 grams of protein per day. Patient is drinking 55 ounces of water per day. She has been having left foot pain and has hx of a callus that she has had to file down her self in the past. With her being diabetic, it has been advised that she see a podiatrist. She is agreeable and would like referral to podiatrist in [place], [place].   Vitals:    04/18/18 0855   BP: 137/73   BP Location: Right arm   Patient Position: Sitting   Cuff Size: Adult   Pulse: 62   Temp: 98 °F (36.7 °C)   SpO2: 97%   Weight: 128 kg (281 lb 3.2 oz)   Height: 167.6 cm (66\")         The following portions of the patient's history were reviewed and updated as appropriate: allergies, current medications, past family history, past medical history, past social history, past surgical history and problem list.    Review of Systems   Constitutional: Positive for activity change, fatigue and unexpected weight change. Negative for appetite change.   HENT: Negative.    Eyes: Positive for visual disturbance.        Wears glasses   Respiratory: Positive for apnea and shortness of breath. Negative for cough and chest tightness.         Uses Bipap   Cardiovascular: Positive for chest pain, palpitations and leg swelling.   Gastrointestinal: Negative.  Negative for abdominal pain, anal bleeding, constipation, nausea and vomiting.   Endocrine: Negative.         Blood sugar issues   Genitourinary: Negative.    Musculoskeletal: " Positive for arthralgias, back pain, gait problem, myalgias and neck pain.   Skin: Negative.    Allergic/Immunologic: Negative.    Neurological: Negative for seizures and syncope.   Hematological: Bruises/bleeds easily.   Psychiatric/Behavioral: Positive for dysphoric mood. Negative for self-injury, sleep disturbance and suicidal ideas. The patient is nervous/anxious.        Objective   Physical Exam   Constitutional: She is oriented to person, place, and time. Vital signs are normal. She appears well-developed and well-nourished. She is cooperative. No distress.   HENT:   Head: Normocephalic and atraumatic.   Nose: Nose normal.   Mouth/Throat: Oropharynx is clear and moist. No oropharyngeal exudate or tonsillar abscesses.   Eyes: Conjunctivae, EOM and lids are normal. Pupils are equal, round, and reactive to light. Right eye exhibits no discharge. Left eye exhibits no discharge.   Glasses noted   Neck: Trachea normal. Neck supple. No JVD present. Carotid bruit is not present. No no neck rigidity. No tracheal deviation present. No thyromegaly present.   Cardiovascular: Normal rate, regular rhythm, S1 normal, S2 normal and normal heart sounds.    Pulmonary/Chest: Effort normal and breath sounds normal. No stridor. No respiratory distress. She has no wheezes. She has no rales.   Abdominal: Soft. Bowel sounds are normal. She exhibits no distension. There is no tenderness.   obese   Musculoskeletal: She exhibits edema.        Right shoulder: She exhibits normal strength.   2+ edema to left ankle (chronic)   Lymphadenopathy:     She has no cervical adenopathy.   Neurological: She is alert and oriented to person, place, and time. She has normal strength. No cranial nerve deficit.   Skin: Skin is warm, dry and intact. No rash noted.   Psychiatric: She has a normal mood and affect. Her speech is normal and behavior is normal.   Alert and oriented x 3   Vitals reviewed.      Assessment/Plan   Barbara was seen today for  follow-up, obesity, nutrition counseling and weight loss.    Diagnoses and all orders for this visit:    Obesity, Class III, BMI 40-49.9 (morbid obesity)  -     Ambulatory Referral to Podiatry    Type 2 diabetes mellitus without complication, without long-term current use of insulin  -     Ambulatory Referral to Podiatry    Left foot pain  -     Ambulatory Referral to Podiatry    Callus of foot  -     Ambulatory Referral to Podiatry          Barbara Tapia has struggled some this month with healthy changes. Patient has gained 6 pounds. Today we discussed healthy changes in lifestyle, diet, and exercise.   Handout provided on weight proofing your home and mindful eating.  Intensive behavioral therapy for obesity was done today.   Goals for this month are: 3 meals per day with protein at each; eat protein first, use smaller plate; exercise as advised (swimming).   Podiatry referral was made today for evaluation of left foot pain and callus formation (has DM).  Office will arrange with [place]. Martinez [place] [facility] [place].   Follow up in 6 weeks for a weight recheck.

## 2018-04-18 NOTE — PATIENT INSTRUCTIONS
Barbara Tapia has struggled some this month with healthy changes. Patient has gained 6 pounds. Today we discussed healthy changes in lifestyle, diet, and exercise.   Handout provided on weight proofing your home and mindful eating.  Intensive behavioral therapy for obesity was done today.   Goals for this month are: 3 meals per day with protein at each; eat protein first, use smaller plate; exercise as advised (swimming).   Podiatry referral was made today for evaluation of left foot pain and callus formation (has DM).  [place] [facility] [place]   Follow up in 6 weeks for a weight recheck.

## 2018-04-18 NOTE — TELEPHONE ENCOUNTER
Patient was notified of her appt and locations with Dr Martinez on 05/18/2018 at 10:00am. She was agreeable to date and time.

## 2018-04-18 NOTE — PROGRESS NOTES
Barbara Tapia  [id number]  1960  58 y.o.  female    Referring Provider: Briana Bartlett MD    Reason for  Visit:  Routine follow up.  Chest pain  Shortness of breath and palpitations  Subjective      Subjective    Moderate exertional shortness of breath on exertion relieved with rest  No significant cough or wheezing  Going on for several months  Intermittent palpitations that she is very concerned abou  Prior holter unrevealing for etiology of her symptoms  No associated chest pain  No significant pedal edema  No fever or chills  No significant expectoration  No hemoptysis  No presyncope or syncope   Atypical chest pain   Non exertional chest pain   Diego seen Dr Conrad for Gastric sleeve  Cardiac cath non obstructive coronary artery disease      History of present illness:  Barbara Tapia is a 58 y.o. yo female with history of Essential Hypertension, Type 2 diabetes mellitus  Morbid obesity     who presents today for   Chief Complaint   Patient presents with   • Palpitations     6 mo f/u   .    History  Past Medical History:   Diagnosis Date   • Anxiety    • Arthritis    • Asthma    • Back pain    • Chest pain    • Chronic fatigue    • Chronic kidney disease     Kidneys are only functioning 50 %   • Diabetes    • Diabetes mellitus    • Fibromyalgia    • Hiatal hernia    • Hypertension    • Knee contracture     total   • Palpitations    • PONV (postoperative nausea and vomiting)    • Sleep apnea    • Sleep apnea     BiPAP   ,   Past Surgical History:   Procedure Laterality Date   • ANKLE SURGERY Left     multiple surgeries   • CARDIAC CATHETERIZATION Left 4/19/2017    Procedure: Cardiac Catheterization/Vascular Study;  Surgeon: Shane Singh MD;  Location:  [facility] CATH INVASIVE LOCATION;  Service:    • CARDIAC CATHETERIZATION N/A 4/19/2017    Procedure: Coronary angiography;  Surgeon: Shane Singh MD;  Location:  [facility] CATH INVASIVE LOCATION;  Service:    • CARDIAC CATHETERIZATION N/A 4/19/2017    Procedure: Left  Heart Cath;  Surgeon: Shane Singh MD;  Location: [facility] CATH INVASIVE LOCATION;  Service:    • CERVICAL FUSION      STITCHED UP TO PREVENT LOSING BABY   • CERVIX LESION DESTRUCTION     •  SECTION     • COLONOSCOPY     • COLONOSCOPY N/A 2017    Procedure: COLONOSCOPY WITH ANESTHESIA;  Surgeon: Raffaele Buchanan MD;  Location: [facility] ENDOSCOPY;  Service:    • ELBOW PROCEDURE Left     screws placed x 2   • ENDOSCOPY N/A 2017    Procedure: ESOPHAGOGASTRODUODENOSCOPY WITH ANESTHESIA;  Surgeon: Raffaele Buchanan MD;  Location: [facility] ENDOSCOPY;  Service:    • EXCISION MASS TRUNK      LEFT SIDE CAT SCRATCH FEVER    • PARTIAL HYSTERECTOMY      ovaries still intact   • REPLACEMENT TOTAL KNEE BILATERAL Bilateral    • ROTATOR CUFF REPAIR     • SINUS SURGERY     • TENNIS ELBOW RELEASE Right    ,   Family History   Problem Relation Age of Onset   • Heart disease Mother    • Obesity Mother    • Diabetes Mother    • Hypertension Mother    • Stroke Mother    • Heart disease Father    • Stroke Brother    • Heart disease Brother    • Colon cancer Neg Hx    • Colon polyps Neg Hx    ,   Social History   Substance Use Topics   • Smoking status: Never Smoker   • Smokeless tobacco: Never Used   • Alcohol use No   ,     Medications  Current Outpatient Prescriptions   Medication Sig Dispense Refill   • albuterol (PROVENTIL HFA;VENTOLIN HFA) 108 (90 BASE) MCG/ACT inhaler 2 puffs 4 (Four) Times a Day.     • amLODIPine (NORVASC) 10 MG tablet Take 10 mg by mouth Daily.     • bisacodyl (DULCOLAX) 5 MG EC tablet Take 25 mg by mouth Every Other Day.     • carboxymethylcellulose (REFRESH PLUS) 0.5 % solution 3 (Three) Times a Day As Needed for Dry Eyes.     • diazePAM (VALIUM) 5 MG tablet Take 5 mg by mouth 3 (Three) Times a Day.     • doxepin (SINEquan) 100 MG capsule Take 100 mg by mouth Every Night.     • DULoxetine (CYMBALTA) 60 MG capsule Take 60 mg by mouth Daily.     • fexofenadine (ALLEGRA) 180 MG tablet Take 180 mg by  mouth daily     • glipiZIDE (GLUCOTROL) 10 MG tablet Take 10 mg by mouth 2 (Two) Times a Day Before Meals.     • hydrOXYzine (VISTARIL) 50 MG capsule Take 50 mg by mouth 2 times daily     • insulin glargine (LANTUS) 100 UNIT/ML injection Inject 35 Units under the skin 2 (Two) Times a Day.     • ketoconazole (NIZORAL) 200 MG tablet Take 200 mg by mouth daily     • losartan-hydrochlorothiazide (HYZAAR) 50-12.5 MG per tablet 1 tablet 2 (Two) Times a Day.     • meclizine (ANTIVERT) 12.5 MG tablet Take 12.5 mg by mouth 3 (Three) Times a Day.     • MEGARED OMEGA-3 KRILL  MG capsule 800MG CAPSULE DAILY     • metoprolol tartrate (LOPRESSOR) 25 MG tablet Take 2 tablets by mouth Every 12 (Twelve) Hours. 180 tablet 3   • Multiple Vitamins-Minerals (CENTRUM ULTRA WOMENS) tablet Take 1 capsule by mouth Daily.     • nystatin (MYCOSTATIN) 631691 UNIT/ML suspension Take 500,000 Units by mouth As Needed.     • probiotic (CULTURELLE) capsule capsule Take 1 capsule by mouth Daily.     • Sennosides (SENOKOT PO) Take 4 tablets by mouth Every Other Day.     • simvastatin (ZOCOR) 10 MG tablet Take 10 mg by mouth Every Night.     • tiZANidine (ZANAFLEX) 4 MG tablet 4 mg Every 8 (Eight) Hours.     • traMADol (ULTRAM) 50 MG tablet 50 mg 4 (Four) Times a Day.       No current facility-administered medications for this visit.        Allergies:  Azelastine-fluticasone; Contrast dye; Iodides; Metformin and related; Oxycodone-aspirin; Pregabalin; Sulfa antibiotics; and Nisoldipine er    Review of Systems  Review of Systems   Constitution: Positive for weakness and malaise/fatigue.   HENT: Negative.    Eyes: Negative.    Cardiovascular: Positive for chest pain, dyspnea on exertion and palpitations. Negative for claudication, cyanosis, irregular heartbeat, leg swelling, near-syncope, orthopnea, paroxysmal nocturnal dyspnea and syncope.   Respiratory: Negative.    Endocrine: Negative.    Hematologic/Lymphatic: Negative.    Skin: Negative.   "  Musculoskeletal: Positive for arthritis and back pain.   Gastrointestinal: Positive for dysphagia and heartburn. Negative for anorexia.   Genitourinary: Negative.    Neurological: Positive for dizziness.   Psychiatric/Behavioral: Negative.        Objective     Physical Exam:  /72   Pulse 63   Ht 167.6 cm (66\")   Wt 118 kg (261 lb)   LMP  (LMP Unknown)   SpO2 95%   BMI 42.13 kg/m²   Physical Exam   Constitutional: She appears well-developed.   HENT:   Head: Normocephalic.   Neck: Normal carotid pulses and no JVD present. No tracheal tenderness present. Carotid bruit is not present. No tracheal deviation and no edema present.   Cardiovascular: Regular rhythm, normal heart sounds and normal pulses.    Pulmonary/Chest: Effort normal. No stridor.   Abdominal: Soft.   Neurological: She is alert. She has normal strength. No cranial nerve deficit or sensory deficit.   Skin: Skin is warm.   Psychiatric: She has a normal mood and affect. Her speech is normal and behavior is normal.       Results Review:       ECG 12 Lead  Date/Time: 4/18/2018 12:09 PM  Performed by: JERMAINE CHAVEZ  Authorized by: JERMAINE CHAVEZ   Comparison: compared with previous ECG from 10/16/2017  Similar to previous ECG  Rhythm: sinus rhythm  Rate: normal  Conduction: conduction normal  ST Segments: ST segments normal  T Waves: T waves normal  QRS axis: normal  Other: no other findings  Clinical impression: normal ECG            Assessment/Plan   Patient Active Problem List   Diagnosis   • Palpitation   • Morbid obesity due to excess calories   • BLADIMIR treated with BiPAP   • Chest pain   • Essential hypertension   • Type 2 diabetes mellitus without complication, without long-term current use of insulin   • Mixed hyperlipidemia   • Anxiety and depression   • Other dysphagia   • Gastroesophageal reflux disease without esophagitis   • Encounter for screening for malignant neoplasm of colon   • Esophageal dysphagia   • Nonsmoker   • Obesity, " "unspecified obesity severity, unspecified obesity type   • Slow transit constipation   • Sleep apnea   • Obesity, Class III, BMI 40-49.9 (morbid obesity)       Intermittent  palpitations. No significant pedal edema. Compliant with medications and diet. Latest labs and medications reviewed.    Plan:    Zio patch as prior holter unrevealing for etiology of her symptoms     Low salt/ HTN/ Heart healthy carbohydrate restricted cardiac diet as applicable to this patient's current diagnoses.   This handout has relevant information regarding shopping for food, preparing meals, what to eat at restaurants, tracking of food intake, information regarding sodium intake and salt content, how to read food labels, knowing what to eat, tips reagarding physical activity, calorie count and calorie expenditure. What foods to avoid. Information regarding alcoholic drinks along with \"good\" and \"bad\" fats.  Relevant printed educational materials given pertinent to above diagnoses      The patient is advised to reduce or avoid caffeine or other cardiac stimulants.     The patient was advised that NSAID-type medications have three  very important potential side effects: cardiovascular complications, gastrointestinal irritation including hemorrhage and renal injuries.  Do not use anti-inflammatories such as Motrin/ibuprofen, Alleve/naprosyn, Mobic and like medications Use Tylenol instead.The patient expresses understanding of these issues and questions were answered.   Monitor for any signs of bleeding including red or dark stools. Fall precautions.       Keep LDL below 70 mg/dl. Monitor liver and renal functions.   Monitor CBC, CMP and Lipid Panel by primary  Keep A1c less than 7 Primary to monitor         Return in about 3 months (around 7/18/2018).                "

## 2018-04-20 ENCOUNTER — RESULTS ENCOUNTER (OUTPATIENT)
Dept: BARIATRICS/WEIGHT MGMT | Facility: CLINIC | Age: 58
End: 2018-04-20

## 2018-04-20 DIAGNOSIS — E11.9 TYPE 2 DIABETES MELLITUS WITHOUT COMPLICATION, WITHOUT LONG-TERM CURRENT USE OF INSULIN (HCC): ICD-10-CM

## 2018-04-20 DIAGNOSIS — E66.01 OBESITY, CLASS III, BMI 40-49.9 (MORBID OBESITY) (HCC): ICD-10-CM

## 2018-04-20 DIAGNOSIS — I10 ESSENTIAL HYPERTENSION: ICD-10-CM

## 2018-04-20 DIAGNOSIS — E78.2 MIXED HYPERLIPIDEMIA: ICD-10-CM

## 2018-05-18 ENCOUNTER — RESULTS ENCOUNTER (OUTPATIENT)
Dept: BARIATRICS/WEIGHT MGMT | Facility: CLINIC | Age: 58
End: 2018-05-18

## 2018-05-18 DIAGNOSIS — E66.01 OBESITY, CLASS III, BMI 40-49.9 (MORBID OBESITY) (HCC): ICD-10-CM

## 2018-05-18 DIAGNOSIS — I10 ESSENTIAL HYPERTENSION: ICD-10-CM

## 2018-05-18 DIAGNOSIS — E78.2 MIXED HYPERLIPIDEMIA: ICD-10-CM

## 2018-05-18 DIAGNOSIS — E11.9 TYPE 2 DIABETES MELLITUS WITHOUT COMPLICATION, WITHOUT LONG-TERM CURRENT USE OF INSULIN (HCC): ICD-10-CM

## 2018-06-15 ENCOUNTER — RESULTS ENCOUNTER (OUTPATIENT)
Dept: BARIATRICS/WEIGHT MGMT | Facility: CLINIC | Age: 58
End: 2018-06-15

## 2018-06-15 DIAGNOSIS — E11.9 TYPE 2 DIABETES MELLITUS WITHOUT COMPLICATION, WITHOUT LONG-TERM CURRENT USE OF INSULIN (HCC): ICD-10-CM

## 2018-06-15 DIAGNOSIS — E66.01 OBESITY, CLASS III, BMI 40-49.9 (MORBID OBESITY) (HCC): ICD-10-CM

## 2018-06-15 DIAGNOSIS — E78.2 MIXED HYPERLIPIDEMIA: ICD-10-CM

## 2018-06-15 DIAGNOSIS — I10 ESSENTIAL HYPERTENSION: ICD-10-CM

## 2018-07-13 ENCOUNTER — RESULTS ENCOUNTER (OUTPATIENT)
Dept: BARIATRICS/WEIGHT MGMT | Facility: CLINIC | Age: 58
End: 2018-07-13

## 2018-07-13 DIAGNOSIS — I10 ESSENTIAL HYPERTENSION: ICD-10-CM

## 2018-07-13 DIAGNOSIS — E78.2 MIXED HYPERLIPIDEMIA: ICD-10-CM

## 2018-07-13 DIAGNOSIS — E66.01 OBESITY, CLASS III, BMI 40-49.9 (MORBID OBESITY) (HCC): ICD-10-CM

## 2018-07-13 DIAGNOSIS — E11.9 TYPE 2 DIABETES MELLITUS WITHOUT COMPLICATION, WITHOUT LONG-TERM CURRENT USE OF INSULIN (HCC): ICD-10-CM

## 2018-07-25 ENCOUNTER — OFFICE VISIT (OUTPATIENT)
Dept: CARDIOLOGY | Facility: CLINIC | Age: 58
End: 2018-07-25

## 2018-07-25 VITALS
DIASTOLIC BLOOD PRESSURE: 80 MMHG | BODY MASS INDEX: 47.09 KG/M2 | HEIGHT: 66 IN | WEIGHT: 293 LBS | OXYGEN SATURATION: 96 % | SYSTOLIC BLOOD PRESSURE: 142 MMHG | HEART RATE: 70 BPM

## 2018-07-25 DIAGNOSIS — Z78.9 NONSMOKER: ICD-10-CM

## 2018-07-25 DIAGNOSIS — I10 ESSENTIAL HYPERTENSION: ICD-10-CM

## 2018-07-25 DIAGNOSIS — F32.A ANXIETY AND DEPRESSION: ICD-10-CM

## 2018-07-25 DIAGNOSIS — E11.9 TYPE 2 DIABETES MELLITUS WITHOUT COMPLICATION, WITHOUT LONG-TERM CURRENT USE OF INSULIN (HCC): ICD-10-CM

## 2018-07-25 DIAGNOSIS — G47.33 OSA TREATED WITH BIPAP: ICD-10-CM

## 2018-07-25 DIAGNOSIS — F41.9 ANXIETY AND DEPRESSION: ICD-10-CM

## 2018-07-25 DIAGNOSIS — R07.2 PRECORDIAL PAIN: ICD-10-CM

## 2018-07-25 DIAGNOSIS — R00.2 PALPITATION: Primary | ICD-10-CM

## 2018-07-25 DIAGNOSIS — E66.01 MORBID OBESITY DUE TO EXCESS CALORIES (HCC): ICD-10-CM

## 2018-07-25 DIAGNOSIS — E78.2 MIXED HYPERLIPIDEMIA: ICD-10-CM

## 2018-07-25 DIAGNOSIS — E66.9 OBESITY, UNSPECIFIED OBESITY SEVERITY, UNSPECIFIED OBESITY TYPE: ICD-10-CM

## 2018-07-25 PROCEDURE — 93000 ELECTROCARDIOGRAM COMPLETE: CPT | Performed by: INTERNAL MEDICINE

## 2018-07-25 PROCEDURE — 99214 OFFICE O/P EST MOD 30 MIN: CPT | Performed by: INTERNAL MEDICINE

## 2018-07-25 NOTE — PROGRESS NOTES
Barbara Tapia  [id number]  1960  58 y.o.  female    Referring Provider: Briana Bartlett MD    Reason for  Visit:  Routine follow up.  Chest pain  Shortness of breath and palpitations    Subjective      Similar symptoms as during last visit   Moderate exertional shortness of breath on exertion relieved with rest  No significant cough or wheezing  Going on for several months  Intermittent palpitations that she is very concerned abou  Prior holter unrevealing for etiology of her symptoms  No associated chest pain  No significant pedal edema  No fever or chills  No significant expectoration  No hemoptysis  No presyncope or syncope   Atypical chest pain   Non exertional chest pain   Diego seen Dr Conrad for Gastric sleeve  Cardiac cath non obstructive coronary artery disease      History of present illness:  Barbara Tapia is a 58 y.o. yo female with history of Essential Hypertension, Type 2 diabetes mellitus  Morbid obesity     who presents today for   Chief Complaint   Patient presents with   • Palpitations     3 MO F/U - ZIO RESULTS   • Fatigue   • Chest Pain   • Shortness of Breath   .    History  Past Medical History:   Diagnosis Date   • Anxiety    • Arthritis    • Asthma    • Back pain    • Chest pain    • Chronic fatigue    • Chronic kidney disease     Kidneys are only functioning 50 %   • Diabetes (CMS/HCC)    • Diabetes mellitus (CMS/HCC)    • Fibromyalgia    • Hiatal hernia    • Hypertension    • Knee contracture     total   • Palpitations    • PONV (postoperative nausea and vomiting)    • Sleep apnea    • Sleep apnea     BiPAP   ,   Past Surgical History:   Procedure Laterality Date   • ANKLE SURGERY Left     multiple surgeries   • CARDIAC CATHETERIZATION Left 4/19/2017    Procedure: Cardiac Catheterization/Vascular Study;  Surgeon: Shane Singh MD;  Location: [facility] CATH INVASIVE LOCATION;  Service:    • CARDIAC CATHETERIZATION N/A 4/19/2017    Procedure: Coronary angiography;  Surgeon: Shane Snigh MD;   Location:  [facility] CATH INVASIVE LOCATION;  Service:    • CARDIAC CATHETERIZATION N/A 2017    Procedure: Left Heart Cath;  Surgeon: Shane Singh MD;  Location:  [facility] CATH INVASIVE LOCATION;  Service:    • CERVICAL FUSION      STITCHED UP TO PREVENT LOSING BABY   • CERVIX LESION DESTRUCTION     •  SECTION     • COLONOSCOPY     • COLONOSCOPY N/A 2017    Procedure: COLONOSCOPY WITH ANESTHESIA;  Surgeon: Raffaele Buchanan MD;  Location:  [facility] ENDOSCOPY;  Service:    • ELBOW PROCEDURE Left     screws placed x 2   • ENDOSCOPY N/A 2017    Procedure: ESOPHAGOGASTRODUODENOSCOPY WITH ANESTHESIA;  Surgeon: Raffaele Buchanan MD;  Location:  [facility] ENDOSCOPY;  Service:    • EXCISION MASS TRUNK      LEFT SIDE CAT SCRATCH FEVER    • PARTIAL HYSTERECTOMY      ovaries still intact   • REPLACEMENT TOTAL KNEE BILATERAL Bilateral    • ROTATOR CUFF REPAIR     • SINUS SURGERY     • TENNIS ELBOW RELEASE Right    ,   Family History   Problem Relation Age of Onset   • Heart disease Mother    • Obesity Mother    • Diabetes Mother    • Hypertension Mother    • Stroke Mother    • Heart disease Father    • Stroke Brother    • Heart disease Brother    • Colon cancer Neg Hx    • Colon polyps Neg Hx    ,   Social History   Substance Use Topics   • Smoking status: Never Smoker   • Smokeless tobacco: Never Used   • Alcohol use No   ,     Medications  Current Outpatient Prescriptions   Medication Sig Dispense Refill   • albuterol (PROVENTIL HFA;VENTOLIN HFA) 108 (90 BASE) MCG/ACT inhaler 2 puffs 4 (Four) Times a Day.     • amLODIPine (NORVASC) 10 MG tablet Take 10 mg by mouth Daily.     • bisacodyl (DULCOLAX) 5 MG EC tablet Take 25 mg by mouth Every Other Day.     • carboxymethylcellulose (REFRESH PLUS) 0.5 % solution 3 (Three) Times a Day As Needed for Dry Eyes.     • diazePAM (VALIUM) 5 MG tablet Take 5 mg by mouth 3 (Three) Times a Day.     • doxepin (SINEquan) 100 MG capsule Take 100 mg by mouth Every Night.     • DULoxetine  (CYMBALTA) 60 MG capsule Take 60 mg by mouth Daily.     • fexofenadine (ALLEGRA) 180 MG tablet Take 180 mg by mouth daily     • glipiZIDE (GLUCOTROL) 10 MG tablet Take 10 mg by mouth 2 (Two) Times a Day Before Meals.     • hydrOXYzine (VISTARIL) 50 MG capsule Take 50 mg by mouth 2 times daily     • insulin glargine (LANTUS) 100 UNIT/ML injection Inject 35 Units under the skin 2 (Two) Times a Day.     • ketoconazole (NIZORAL) 200 MG tablet Take 200 mg by mouth daily     • losartan-hydrochlorothiazide (HYZAAR) 50-12.5 MG per tablet 1 tablet 2 (Two) Times a Day.     • meclizine (ANTIVERT) 12.5 MG tablet Take 12.5 mg by mouth 3 (Three) Times a Day.     • MEGARED OMEGA-3 KRILL  MG capsule 800MG CAPSULE DAILY     • metoprolol tartrate (LOPRESSOR) 25 MG tablet Take 2 tablets by mouth Every 12 (Twelve) Hours. 180 tablet 3   • Multiple Vitamins-Minerals (CENTRUM ULTRA WOMENS) tablet Take 1 capsule by mouth Daily.     • nystatin (MYCOSTATIN) 428346 UNIT/ML suspension Take 500,000 Units by mouth As Needed.     • probiotic (CULTURELLE) capsule capsule Take 1 capsule by mouth Daily.     • Sennosides (SENOKOT PO) Take 4 tablets by mouth Every Other Day.     • simvastatin (ZOCOR) 10 MG tablet Take 10 mg by mouth Every Night.     • tiZANidine (ZANAFLEX) 4 MG tablet 4 mg Every 8 (Eight) Hours.     • traMADol (ULTRAM) 50 MG tablet 50 mg 4 (Four) Times a Day.       No current facility-administered medications for this visit.        Allergies:  Azelastine-fluticasone; Contrast dye; Iodides; Metformin and related; Oxycodone-aspirin; Pregabalin; Sulfa antibiotics; and Nisoldipine er    Review of Systems  Review of Systems   Constitution: Positive for weakness and malaise/fatigue.   HENT: Negative.    Eyes: Negative.    Cardiovascular: Positive for chest pain, dyspnea on exertion, leg swelling and palpitations. Negative for claudication, cyanosis, irregular heartbeat, near-syncope, orthopnea, paroxysmal nocturnal dyspnea and  "syncope.   Respiratory: Negative.    Endocrine: Negative.    Hematologic/Lymphatic: Negative.    Skin: Negative.    Musculoskeletal: Positive for arthritis and back pain.   Gastrointestinal: Positive for dysphagia and heartburn. Negative for anorexia.   Genitourinary: Negative.    Neurological: Positive for dizziness.   Psychiatric/Behavioral: Negative.        Objective     Physical Exam:  /80   Pulse 70   Ht 167.6 cm (66\")   Wt (!) 137 kg (301 lb)   LMP  (LMP Unknown)   SpO2 96%   BMI 48.58 kg/m²   Physical Exam   Constitutional: She appears well-developed.   HENT:   Head: Normocephalic.   Neck: Normal carotid pulses and no JVD present. No tracheal tenderness present. Carotid bruit is not present. No tracheal deviation and no edema present.   Cardiovascular: Regular rhythm, normal heart sounds and normal pulses.    Pulmonary/Chest: Effort normal. No stridor.   Abdominal: Soft.     Vascular Status -  Her right foot exhibits abnormal foot edema. Her left foot exhibits abnormal foot edema.  Neurological: She is alert. She has normal strength. No cranial nerve deficit or sensory deficit.   Skin: Skin is warm.   Psychiatric: She has a normal mood and affect. Her speech is normal and behavior is normal.       Results Review:  Zio patch     · Average HR: 64. Min HR: 45. Max HR: 143.     · The predominant rhythm noted during the testing period was sinus rhythm.  · Premature atrial and ventricular contractions occured as below.  · Total ectopy burden during the monitoring period; PVCs:  <1% and APCs: <1%                   · No significant supraventricular or ventricular tachy or kaitlynn arrhythmia.  · 28    supraventricular tachycardia episodes fastest 5    beats at maximum rate of    143    BPM   · No correlated arrhythmia  · No significant pauses  · Entire report was reviewed        Conclusion  Non obstructive  coronary artery disease with dominant RCA  Normal LVEF  LVEDP   29    mm Hg     Plan     Hydration " "overnight   Nephrology consult  Observation  Risk factor modifications  Workup for non cardiac causes of chest pain          ECG 12 Lead  Date/Time: 7/25/2018 11:15 AM  Performed by: JERMAINE CHAVEZ  Authorized by: JERMAINE CHAVEZ   Comparison: compared with previous ECG from 4/18/2018  Similar to previous ECG  Rhythm: sinus rhythm  Rate: normal  Conduction: conduction normal  ST Segments: ST segments normal  T Waves: T waves normal  QRS axis: normal  Other: no other findings  Clinical impression: normal ECG            Assessment/Plan   Patient Active Problem List   Diagnosis   • Palpitation   • Morbid obesity due to excess calories (CMS/HCC)   • BLADIMIR treated with BiPAP   • Chest pain   • Essential hypertension   • Type 2 diabetes mellitus without complication, without long-term current use of insulin (CMS/HCC)   • Mixed hyperlipidemia   • Anxiety and depression   • Other dysphagia   • Gastroesophageal reflux disease without esophagitis   • Encounter for screening for malignant neoplasm of colon   • Esophageal dysphagia   • Nonsmoker   • Obesity, unspecified obesity severity, unspecified obesity type   • Slow transit constipation   • Sleep apnea   • Obesity, Class III, BMI 40-49.9 (morbid obesity) (CMS/HCC)       Intermittent  palpitations. No significant pedal edema. Compliant with medications and diet. Latest labs and medications reviewed.    Plan:    Low salt/ HTN/ Heart healthy carbohydrate restricted cardiac diet as applicable to this patient's current diagnoses.   This handout has relevant information regarding shopping for food, preparing meals, what to eat at restaurants, tracking of food intake, information regarding sodium intake and salt content, how to read food labels, knowing what to eat, tips reagarding physical activity, calorie count and calorie expenditure. What foods to avoid. Information regarding alcoholic drinks along with \"good\" and \"bad\" fats.  Reiterated prior recommendations     The patient is " advised to reduce or avoid caffeine or other cardiac stimulants.     The patient was advised that NSAID-type medications have three  very important potential side effects: cardiovascular complications, gastrointestinal irritation including hemorrhage and renal injuries.  Do not use anti-inflammatories such as Motrin/ibuprofen, Alleve/naprosyn, Mobic and like medications Use Tylenol instead.The patient expresses understanding of these issues and questions were answered.   Monitor for any signs of bleeding including red or dark stools. Fall precautions.       Monitor for any signs of bleeding including red or dark stools. Fall precautions.   Patient is asked to monitor BP at home or work, several times per month and return with written values at next office visit.     Advised staying uptodate with immunizations per established standard guidelines.    Reviewed available prior notes, consults, prior visits, laboratory findings, radiology And cardiology relevant reports. Updated chart as applicable. I have reviewed the patient's medical history in detail and updated the computerized patient record as relevant.    Updated patient regarding any new or relevant abnormalities on review of records or any new findings on physical exam. Mentioned to patient about purpose of visit and desirable health short and long term goals and objectives.    Offered to give patient  a copy of my notes and relevant tests/ prior ECG etc for the patient to review and follow specific advise and relevant findings if any, prognosis, along with my current and future plans.      Questions were encouraged, asked and answered to the patient's  understanding and satisfaction. Questions if any regarding current medications and side effects, need for refills and importance of compliance to medications stressed.    Reviewed available prior notes, consults, prior visits, laboratory findings, radiology and cardiology relevant reports. Updated chart as  applicable. I have reviewed the patient's medical history in detail and updated the computerized patient record as relevant.    Updated patient regarding any new or relevant abnormalities on review of records or any new findings on physical exam. Mentioned to patient about purpose of visit and desirable health short and long term goals and objectives.    Keep A1c less than 7 Primary to monitor  Keep LDL below 70 mg/dl. Monitor liver and renal functions.   Monitor CBC, CMP, TSH (as indicated) and Lipid Panel by primary     Depending on creatinine and potassium level can increase Hyzaar as required after seen by Nephrology service next week    Requested Prescriptions     Signed Prescriptions Disp Refills   • metoprolol tartrate (LOPRESSOR) 25 MG tablet 180 tablet 3     Sig: Take 2 tablets by mouth Every 12 (Twelve) Hours.        No additional cardiac testing required at this point in time.     She is wanting to wait for weight loss surgery      Return in about 6 months (around 1/25/2019).

## 2018-08-10 ENCOUNTER — RESULTS ENCOUNTER (OUTPATIENT)
Dept: BARIATRICS/WEIGHT MGMT | Facility: CLINIC | Age: 58
End: 2018-08-10

## 2018-08-10 DIAGNOSIS — E66.01 OBESITY, CLASS III, BMI 40-49.9 (MORBID OBESITY) (HCC): ICD-10-CM

## 2018-08-10 DIAGNOSIS — E78.2 MIXED HYPERLIPIDEMIA: ICD-10-CM

## 2018-08-10 DIAGNOSIS — I10 ESSENTIAL HYPERTENSION: ICD-10-CM

## 2018-08-10 DIAGNOSIS — E11.9 TYPE 2 DIABETES MELLITUS WITHOUT COMPLICATION, WITHOUT LONG-TERM CURRENT USE OF INSULIN (HCC): ICD-10-CM

## 2018-09-07 ENCOUNTER — RESULTS ENCOUNTER (OUTPATIENT)
Dept: BARIATRICS/WEIGHT MGMT | Facility: CLINIC | Age: 58
End: 2018-09-07

## 2018-09-07 DIAGNOSIS — E11.9 TYPE 2 DIABETES MELLITUS WITHOUT COMPLICATION, WITHOUT LONG-TERM CURRENT USE OF INSULIN (HCC): ICD-10-CM

## 2018-09-07 DIAGNOSIS — E78.2 MIXED HYPERLIPIDEMIA: ICD-10-CM

## 2018-09-07 DIAGNOSIS — E66.01 OBESITY, CLASS III, BMI 40-49.9 (MORBID OBESITY) (HCC): ICD-10-CM

## 2018-09-07 DIAGNOSIS — I10 ESSENTIAL HYPERTENSION: ICD-10-CM

## 2018-10-05 ENCOUNTER — RESULTS ENCOUNTER (OUTPATIENT)
Dept: BARIATRICS/WEIGHT MGMT | Facility: CLINIC | Age: 58
End: 2018-10-05

## 2018-10-05 DIAGNOSIS — E11.9 TYPE 2 DIABETES MELLITUS WITHOUT COMPLICATION, WITHOUT LONG-TERM CURRENT USE OF INSULIN (HCC): ICD-10-CM

## 2018-10-05 DIAGNOSIS — I10 ESSENTIAL HYPERTENSION: ICD-10-CM

## 2018-10-05 DIAGNOSIS — E66.01 OBESITY, CLASS III, BMI 40-49.9 (MORBID OBESITY) (HCC): ICD-10-CM

## 2018-10-05 DIAGNOSIS — E78.2 MIXED HYPERLIPIDEMIA: ICD-10-CM

## 2019-01-28 ENCOUNTER — OFFICE VISIT (OUTPATIENT)
Dept: CARDIOLOGY | Facility: CLINIC | Age: 59
End: 2019-01-28

## 2019-01-28 VITALS
BODY MASS INDEX: 47.09 KG/M2 | SYSTOLIC BLOOD PRESSURE: 130 MMHG | HEIGHT: 66 IN | DIASTOLIC BLOOD PRESSURE: 82 MMHG | WEIGHT: 293 LBS | OXYGEN SATURATION: 96 % | HEART RATE: 69 BPM

## 2019-01-28 DIAGNOSIS — Z78.9 NONSMOKER: ICD-10-CM

## 2019-01-28 DIAGNOSIS — G47.30 SLEEP APNEA, UNSPECIFIED TYPE: ICD-10-CM

## 2019-01-28 DIAGNOSIS — E11.9 TYPE 2 DIABETES MELLITUS WITHOUT COMPLICATION, WITHOUT LONG-TERM CURRENT USE OF INSULIN (HCC): ICD-10-CM

## 2019-01-28 DIAGNOSIS — R00.2 PALPITATION: ICD-10-CM

## 2019-01-28 DIAGNOSIS — G47.33 OSA TREATED WITH BIPAP: ICD-10-CM

## 2019-01-28 DIAGNOSIS — F32.A ANXIETY AND DEPRESSION: Primary | ICD-10-CM

## 2019-01-28 DIAGNOSIS — E78.2 MIXED HYPERLIPIDEMIA: ICD-10-CM

## 2019-01-28 DIAGNOSIS — Z12.11 ENCOUNTER FOR SCREENING FOR MALIGNANT NEOPLASM OF COLON: ICD-10-CM

## 2019-01-28 DIAGNOSIS — I10 ESSENTIAL HYPERTENSION: ICD-10-CM

## 2019-01-28 DIAGNOSIS — E66.01 MORBID OBESITY DUE TO EXCESS CALORIES (HCC): ICD-10-CM

## 2019-01-28 DIAGNOSIS — K59.01 SLOW TRANSIT CONSTIPATION: ICD-10-CM

## 2019-01-28 DIAGNOSIS — E66.9 OBESITY, UNSPECIFIED OBESITY SEVERITY, UNSPECIFIED OBESITY TYPE: ICD-10-CM

## 2019-01-28 DIAGNOSIS — R06.09 DOE (DYSPNEA ON EXERTION): ICD-10-CM

## 2019-01-28 DIAGNOSIS — R13.19 OTHER DYSPHAGIA: ICD-10-CM

## 2019-01-28 DIAGNOSIS — R13.19 ESOPHAGEAL DYSPHAGIA: ICD-10-CM

## 2019-01-28 DIAGNOSIS — E66.01 OBESITY, CLASS III, BMI 40-49.9 (MORBID OBESITY) (HCC): ICD-10-CM

## 2019-01-28 DIAGNOSIS — F41.9 ANXIETY AND DEPRESSION: Primary | ICD-10-CM

## 2019-01-28 DIAGNOSIS — K21.9 GASTROESOPHAGEAL REFLUX DISEASE WITHOUT ESOPHAGITIS: ICD-10-CM

## 2019-01-28 PROCEDURE — 93000 ELECTROCARDIOGRAM COMPLETE: CPT | Performed by: INTERNAL MEDICINE

## 2019-01-28 PROCEDURE — 99214 OFFICE O/P EST MOD 30 MIN: CPT | Performed by: INTERNAL MEDICINE

## 2019-01-28 RX ORDER — FLUTICASONE PROPIONATE 50 MCG
2 SPRAY, SUSPENSION (ML) NASAL DAILY
COMMUNITY

## 2019-01-28 RX ORDER — METOPROLOL TARTRATE 50 MG/1
50 TABLET, FILM COATED ORAL EVERY 12 HOURS SCHEDULED
Qty: 180 TABLET | Refills: 3 | Status: SHIPPED | OUTPATIENT
Start: 2019-01-28 | End: 2020-11-30 | Stop reason: SDUPTHER

## 2019-01-28 NOTE — PROGRESS NOTES
Barbara Tapia  [id number]  1960  58 y.o.  female    Referring Provider: Briana Bartlett MD    Reason for  Visit:  Routine follow up.  Chest pain  Shortness of breath and palpitations      Subjective    Mild chronic exertional shortness of breath on exertion relieved with rest  No significant cough or wheezing  Going on for several months or longer    No palpitations  No associated chest pain  No significant pedal edema    No fever or chills  No significant expectoration    No hemoptysis  No presyncope or syncope     Atypical chest pain   Non exertional chest pain     Diego seen Dr Conrad for Gastric sleeve  Cardiac cath non obstructive coronary artery disease      History of present illness:  Barbara Tapia is a 58 y.o. yo female with history of Essential Hypertension, Type 2 diabetes mellitus  Morbid obesity     who presents today for   Chief Complaint   Patient presents with   • Palpitations     6 mo f/u   • Chest Pain   .    History  Past Medical History:   Diagnosis Date   • Anxiety    • Arthritis    • Asthma    • Back pain    • Chest pain    • Chronic fatigue    • Chronic kidney disease     Kidneys are only functioning 50 %   • Diabetes (CMS/HCC)    • Diabetes mellitus (CMS/HCC)    • Fibromyalgia    • Hiatal hernia    • Hypertension    • Knee contracture     total   • Palpitations    • PONV (postoperative nausea and vomiting)    • Sleep apnea    • Sleep apnea     BiPAP   ,   Past Surgical History:   Procedure Laterality Date   • ANKLE SURGERY Left     multiple surgeries   • CARDIAC CATHETERIZATION Left 4/19/2017    Procedure: Cardiac Catheterization/Vascular Study;  Surgeon: Shane Singh MD;  Location:  [facility] CATH INVASIVE LOCATION;  Service:    • CARDIAC CATHETERIZATION N/A 4/19/2017    Procedure: Coronary angiography;  Surgeon: Shane Singh MD;  Location:  [facility] CATH INVASIVE LOC[facility];  Service:    • CARDIAC CATHETERIZATION N/A 4/19/2017    Procedure: Left Heart Cath;  Surgeon: Shane Singh MD;  Location:  [facility] CATH INVASIVE LOCATION;  Service:    • CERVICAL FUSION      STITCHED UP TO PREVENT LOSING BABY   • CERVIX LESION DESTRUCTION     •  SECTION     • COLONOSCOPY     • COLONOSCOPY N/A 2017    Procedure: COLONOSCOPY WITH ANESTHESIA;  Surgeon: Raffaele Buchanan MD;  Location: [facility] ENDOSCOPY;  Service:    • ELBOW PROCEDURE Left     screws placed x 2   • ENDOSCOPY N/A 2017    Procedure: ESOPHAGOGASTRODUODENOSCOPY WITH ANESTHESIA;  Surgeon: Raffaele Buchanan MD;  Location: [facility] ENDOSCOPY;  Service:    • EXCISION MASS TRUNK      LEFT SIDE CAT SCRATCH FEVER    • PARTIAL HYSTERECTOMY      ovaries still intact   • REPLACEMENT TOTAL KNEE BILATERAL Bilateral    • ROTATOR CUFF REPAIR     • SINUS SURGERY     • TENNIS ELBOW RELEASE Right    ,   Family History   Problem Relation Age of Onset   • Heart disease Mother    • Obesity Mother    • Diabetes Mother    • Hypertension Mother    • Stroke Mother    • Heart disease Father    • Stroke Brother    • Heart disease Brother    • Colon cancer Neg Hx    • Colon polyps Neg Hx    ,   Social History     Tobacco Use   • Smoking status: Never Smoker   • Smokeless tobacco: Never Used   Substance Use Topics   • Alcohol use: No   • Drug use: No   ,     Medications  Current Outpatient Medications   Medication Sig Dispense Refill   • albuterol (PROVENTIL HFA;VENTOLIN HFA) 108 (90 BASE) MCG/ACT inhaler 2 puffs 4 (Four) Times a Day.     • amLODIPine (NORVASC) 10 MG tablet Take 10 mg by mouth Daily.     • carboxymethylcellulose (REFRESH PLUS) 0.5 % solution 3 (Three) Times a Day As Needed for Dry Eyes.     • diazePAM (VALIUM) 5 MG tablet Take 5 mg by mouth 3 (Three) Times a Day.     • doxepin (SINEquan) 100 MG capsule Take 100 mg by mouth Every Night.     • DULoxetine (CYMBALTA) 60 MG capsule Take 60 mg by mouth 2 (Two) Times a Day.     • fexofenadine (ALLEGRA) 180 MG tablet Take 180 mg by mouth daily     • fluticasone (FLONASE) 50 MCG/ACT nasal spray 2 sprays into the  nostril(s) as directed by provider Daily.     • glipiZIDE (GLUCOTROL) 10 MG tablet Take 10 mg by mouth 2 (Two) Times a Day Before Meals.     • hydrOXYzine (VISTARIL) 50 MG capsule Take 50 mg by mouth 2 times daily     • insulin glargine (LANTUS) 100 UNIT/ML injection Inject 55 Units under the skin into the appropriate area as directed 2 (Two) Times a Day.     • ketoconazole (NIZORAL) 200 MG tablet Take 200 mg by mouth daily     • losartan-hydrochlorothiazide (HYZAAR) 50-12.5 MG per tablet 1 tablet 2 (Two) Times a Day.     • meclizine (ANTIVERT) 12.5 MG tablet Take 12.5 mg by mouth 3 (Three) Times a Day.     • MEGARED OMEGA-3 KRILL  MG capsule 1000 MG CAPSULE DAILY     • metoprolol tartrate (LOPRESSOR) 25 MG tablet Take 2 tablets by mouth Every 12 (Twelve) Hours. 180 tablet 3   • Multiple Vitamins-Minerals (CENTRUM ULTRA WOMENS) tablet Take 1 capsule by mouth Daily.     • nystatin (MYCOSTATIN) 366468 UNIT/ML suspension Take 500,000 Units by mouth As Needed.     • probiotic (CULTURELLE) capsule capsule Take 1 capsule by mouth Daily.     • simvastatin (ZOCOR) 10 MG tablet Take 10 mg by mouth Every Night.     • tiZANidine (ZANAFLEX) 4 MG tablet 4 mg 3 (Three) Times a Day.     • traMADol (ULTRAM) 50 MG tablet 50 mg 4 (Four) Times a Day.       No current facility-administered medications for this visit.        Allergies:  Azelastine-fluticasone; Contrast dye; Iodides; Metformin and related; Oxycodone-aspirin; Pregabalin; Sulfa antibiotics; and Nisoldipine er    Review of Systems  Review of Systems   Constitution: Positive for weakness and malaise/fatigue.   HENT: Negative.    Eyes: Negative.    Cardiovascular: Positive for chest pain, dyspnea on exertion, leg swelling and palpitations. Negative for claudication, cyanosis, irregular heartbeat, near-syncope, orthopnea, paroxysmal nocturnal dyspnea and syncope.   Respiratory: Negative.    Endocrine: Negative.    Hematologic/Lymphatic: Negative.    Skin: Negative.   "  Musculoskeletal: Positive for arthritis and back pain.   Gastrointestinal: Positive for dysphagia and heartburn. Negative for anorexia.   Genitourinary: Negative.    Neurological: Positive for dizziness.   Psychiatric/Behavioral: Negative.        Objective     Physical Exam:  /82   Pulse 69   Ht 167.6 cm (66\")   Wt (!) 137 kg (302 lb)   LMP  (LMP Unknown)   SpO2 96%   BMI 48.74 kg/m²   Physical Exam   Constitutional: She appears well-developed.   HENT:   Head: Normocephalic.   Neck: Normal carotid pulses and no JVD present. No tracheal tenderness present. Carotid bruit is not present. No tracheal deviation and no edema present.   Cardiovascular: Regular rhythm, normal heart sounds and normal pulses.   Pulmonary/Chest: Effort normal. No stridor.   Abdominal: Soft.     Vascular Status -  Her right foot exhibits abnormal foot edema. Her left foot exhibits abnormal foot edema.  Neurological: She is alert. She has normal strength. No cranial nerve deficit or sensory deficit.   Skin: Skin is warm.   Psychiatric: She has a normal mood and affect. Her speech is normal and behavior is normal.       Results Review:  Zio patch     · Average HR: 64. Min HR: 45. Max HR: 143.     · The predominant rhythm noted during the testing period was sinus rhythm.  · Premature atrial and ventricular contractions occured as below.  · Total ectopy burden during the monitoring period; PVCs:  <1% and APCs: <1%                   · No significant supraventricular or ventricular tachy or kaitlynn arrhythmia.  · 28    supraventricular tachycardia episodes fastest 5    beats at maximum rate of    143    BPM   · No correlated arrhythmia  · No significant pauses  · Entire report was reviewed        Conclusion  Non obstructive  coronary artery disease with dominant RCA  Normal LVEF  LVEDP   29    mm Hg     Plan     Hydration overnight   Nephrology consult  Observation  Risk factor modifications  Workup for non cardiac causes of chest " pain          ECG 12 Lead  Date/Time: 1/28/2019 10:22 AM  Performed by: Shane Singh MD  Authorized by: Shane Singh MD   Comparison: compared with previous ECG from 7/25/2018  Similar to previous ECG  Rhythm: sinus rhythm  Rate: normal  Conduction: conduction normal  ST Segments: ST segments normal  T Waves: T waves normal  QRS axis: normal  Other: no other findings  Clinical impression: normal ECG            Assessment/Plan   Patient Active Problem List   Diagnosis   • Palpitation   • Morbid obesity due to excess calories (CMS/[facility])   • BLADIMIR treated with BiPAP   • Chest pain   • Essential hypertension   • Type 2 diabetes mellitus without complication, without long-term current use of insulin (CMS/[facility])   • Mixed hyperlipidemia   • Anxiety and depression   • Other dysphagia   • Gastroesophageal reflux disease without esophagitis   • Encounter for screening for malignant neoplasm of colon   • Esophageal dysphagia   • Nonsmoker   • Obesity, unspecified obesity severity, unspecified obesity type   • Slow transit constipation   • Sleep apnea   • Obesity, Class III, BMI 40-49.9 (morbid obesity) (CMS/[facility])     Plan      She is not sure if she wants to see Dr Conrad back  She has been eating more and gaining weight   I do not see how she can loose weight     Keep A1c less than 7 Primary to monitor  Keep LDL below 70 mg/dl. Monitor liver and renal functions.   Monitor CBC, CMP, TSH (as indicated) and Lipid Panel by primary      Requested Prescriptions     Signed Prescriptions Disp Refills   • metoprolol tartrate (LOPRESSOR) 50 MG tablet 180 tablet 3     Sig: Take 1 tablet by mouth Every 12 (Twelve) Hours.      ____________________________________________________________________________________________________________________________________________  Health maintenance and recommendations      Low salt/ HTN/ Heart healthy carbohydrate restricted cardiac diet as    The patient is advised to reduce or avoid caffeine or other  cardiac stimulants.     The patient was advised that NSAID-type medications        Monitor for any signs of bleeding including red or dark stools. Fall precautions.        Advised staying uptodate with immunizations per established standard guidelines.      Offered to give patient  a copy      Questions were encouraged, asked and answered to the patient's  understanding and satisfaction. Questions if any regarding current medications and side effects, need for refills and importance of compliance to medications stressed.    Reviewed available prior notes, consults, prior visits, laboratory findings, radiology and cardiology relevant reports. Updated chart as applicable. I have reviewed the patient's medical history in detail and updated the computerized patient record as relevant.      Updated patient regarding any new or relevant abnormalities on review of records or any new findings on physical exam. Mentioned to patient about purpose of visit and desirable health short and long term goals and objectives.    ___________________________________________________________________________________________________________________________________________         Return in about 6 months (around 7/28/2019).

## 2019-07-29 ENCOUNTER — OFFICE VISIT (OUTPATIENT)
Dept: CARDIOLOGY | Facility: CLINIC | Age: 59
End: 2019-07-29

## 2019-07-29 VITALS
BODY MASS INDEX: 47.09 KG/M2 | SYSTOLIC BLOOD PRESSURE: 122 MMHG | WEIGHT: 293 LBS | DIASTOLIC BLOOD PRESSURE: 84 MMHG | HEART RATE: 95 BPM | OXYGEN SATURATION: 98 % | HEIGHT: 66 IN

## 2019-07-29 DIAGNOSIS — I95.1 AUTONOMIC ORTHOSTATIC HYPOTENSION: ICD-10-CM

## 2019-07-29 DIAGNOSIS — E11.9 TYPE 2 DIABETES MELLITUS WITHOUT COMPLICATION, WITHOUT LONG-TERM CURRENT USE OF INSULIN (HCC): ICD-10-CM

## 2019-07-29 DIAGNOSIS — R00.2 PALPITATION: ICD-10-CM

## 2019-07-29 DIAGNOSIS — E66.01 MORBID OBESITY DUE TO EXCESS CALORIES (HCC): ICD-10-CM

## 2019-07-29 DIAGNOSIS — E66.9 OBESITY, UNSPECIFIED OBESITY SEVERITY, UNSPECIFIED OBESITY TYPE: ICD-10-CM

## 2019-07-29 DIAGNOSIS — K21.9 GASTROESOPHAGEAL REFLUX DISEASE WITHOUT ESOPHAGITIS: ICD-10-CM

## 2019-07-29 DIAGNOSIS — F32.A ANXIETY AND DEPRESSION: Primary | ICD-10-CM

## 2019-07-29 DIAGNOSIS — F41.9 ANXIETY AND DEPRESSION: Primary | ICD-10-CM

## 2019-07-29 DIAGNOSIS — I10 ESSENTIAL HYPERTENSION: ICD-10-CM

## 2019-07-29 DIAGNOSIS — E78.2 MIXED HYPERLIPIDEMIA: ICD-10-CM

## 2019-07-29 DIAGNOSIS — G47.33 OSA TREATED WITH BIPAP: ICD-10-CM

## 2019-07-29 PROCEDURE — 93000 ELECTROCARDIOGRAM COMPLETE: CPT | Performed by: INTERNAL MEDICINE

## 2019-07-29 PROCEDURE — 99214 OFFICE O/P EST MOD 30 MIN: CPT | Performed by: INTERNAL MEDICINE

## 2019-07-29 RX ORDER — LOSARTAN POTASSIUM 50 MG/1
50 TABLET ORAL DAILY
Qty: 90 TABLET | Refills: 3 | Status: SHIPPED | OUTPATIENT
Start: 2019-07-29 | End: 2021-01-19 | Stop reason: DRUGHIGH

## 2019-07-29 RX ORDER — HYDROCHLOROTHIAZIDE 12.5 MG/1
12.5 CAPSULE, GELATIN COATED ORAL DAILY PRN
Qty: 90 CAPSULE | Refills: 3 | Status: SHIPPED | OUTPATIENT
Start: 2019-07-29 | End: 2021-07-01 | Stop reason: DRUGHIGH

## 2019-07-29 NOTE — PROGRESS NOTES
Barbara Tapia  [id number]  1960  59 y.o.  female    Referring Provider: Austin Ocampo MD    Reason for  Visit:  Routine follow up.  Chest pain    Shortness of breath and palpitations  Now sees Dr Ocampo now as Dr Bartlett      Subjective    Mild chronic exertional shortness of breath on exertion relieved with rest  No significant cough or wheezing  Going on for several months or longer    No palpitations  No associated chest pain  No significant pedal edema    No fever or chills  No significant expectoration    No hemoptysis  No presyncope or syncope     Atypical chest pain   Non exertional chest pain     Has seen Dr Conrad for Gastric sleeve, decided against this    Intentional weight loss of  7 lbs per last visit per patient at home  Cardiac cath non obstructive coronary artery disease      This AM got dizzy and felt blacked out for a seconds when she got out of her car  orthostatic hypotension BP supine 130/82 sitting 100/72 with symptoms  Heart rates increased from 85 to 91    History of present illness:  Barbara Tapia is a 59 y.o. yo female with history of Essential Hypertension, Type 2 diabetes mellitus  Morbid obesity     who presents today for   Chief Complaint   Patient presents with   • Palpitations     6 MON FU    .    History  Past Medical History:   Diagnosis Date   • Anxiety    • Arthritis    • Asthma    • Back pain    • Chest pain    • Chronic fatigue    • Chronic kidney disease     Kidneys are only functioning 50 %   • Diabetes (CMS/HCC)    • Diabetes mellitus (CMS/HCC)    • Fibromyalgia    • Hiatal hernia    • Hypertension    • Knee contracture     total   • Palpitations    • PONV (postoperative nausea and vomiting)    • Sleep apnea    • Sleep apnea     BiPAP   ,   Past Surgical History:   Procedure Laterality Date   • ANKLE SURGERY Left     multiple surgeries   • CARDIAC CATHETERIZATION Left 4/19/2017    Procedure: Cardiac Catheterization/Vascular Study;  Surgeon: Shane Singh MD;  Location:   [facility] CATH INVASIVE LOCATION;  Service:    • CARDIAC CATHETERIZATION N/A 2017    Procedure: Coronary angiography;  Surgeon: Shane Singh MD;  Location:  [facility] CATH INVASIVE LOC[facility];  Service:    • CARDIAC CATHETERIZATION N/A 2017    Procedure: Left Heart Cath;  Surgeon: Shane Singh MD;  Location:  [facility] CATH INVASIVE LOCATION;  Service:    • CERVICAL FUSION      STITCHED UP TO PREVENT LOSING BABY   • CERVIX LESION DESTRUCTION     •  SECTION     • COLONOSCOPY     • COLONOSCOPY N/A 2017    Procedure: COLONOSCOPY WITH ANESTHESIA;  Surgeon: Raffaele Buchanan MD;  Location: [facility] ENDOSCOPY;  Service:    • ELBOW PROCEDURE Left     screws placed x 2   • ENDOSCOPY N/A 2017    Procedure: ESOPHAGOGASTRODUODENOSCOPY WITH ANESTHESIA;  Surgeon: Raffaele Buchanan MD;  Location: [facility] ENDOSCOPY;  Service:    • EXCISION MASS TRUNK      LEFT SIDE CAT SCRATCH FEVER    • PARTIAL HYSTERECTOMY      ovaries still intact   • REPLACEMENT TOTAL KNEE BILATERAL Bilateral    • ROTATOR CUFF REPAIR     • SINUS SURGERY     • TENNIS ELBOW RELEASE Right    ,   Family History   Problem Relation Age of Onset   • Heart disease Mother    • Obesity Mother    • Diabetes Mother    • Hypertension Mother    • Stroke Mother    • Heart disease Father    • Stroke Brother    • Heart disease Brother    • Colon cancer Neg Hx    • Colon polyps Neg Hx    ,   Social History     Tobacco Use   • Smoking status: Never Smoker   • Smokeless tobacco: Never Used   Substance Use Topics   • Alcohol use: No   • Drug use: No   ,     Medications  Current Outpatient Medications   Medication Sig Dispense Refill   • albuterol (PROVENTIL HFA;VENTOLIN HFA) 108 (90 BASE) MCG/ACT inhaler 2 puffs 4 (Four) Times a Day.     • amLODIPine (NORVASC) 10 MG tablet Take 10 mg by mouth Daily.     • carboxymethylcellulose (REFRESH PLUS) 0.5 % solution 3 (Three) Times a Day As Needed for Dry Eyes.     • diazePAM (VALIUM) 5 MG tablet Take 5 mg by mouth 3 (Three) Times a  Day.     • DULoxetine (CYMBALTA) 60 MG capsule Take 60 mg by mouth 2 (Two) Times a Day.     • fexofenadine (ALLEGRA) 180 MG tablet Take 180 mg by mouth daily     • fluticasone (FLONASE) 50 MCG/ACT nasal spray 2 sprays into the nostril(s) as directed by provider Daily.     • glipiZIDE (GLUCOTROL) 10 MG tablet Take 10 mg by mouth 2 (Two) Times a Day Before Meals.     • insulin glargine (LANTUS) 100 UNIT/ML injection Inject 55 Units under the skin into the appropriate area as directed 2 (Two) Times a Day.     • insulin lispro (humaLOG) 100 UNIT/ML injection Inject  under the skin into the appropriate area as directed 3 (Three) Times a Day Before Meals.     • ketoconazole (NIZORAL) 200 MG tablet Take 200 mg by mouth daily     • meclizine (ANTIVERT) 12.5 MG tablet Take 12.5 mg by mouth 3 (Three) Times a Day.     • MEGARED OMEGA-3 KRILL  MG capsule 1000 MG CAPSULE DAILY     • metoprolol tartrate (LOPRESSOR) 50 MG tablet Take 1 tablet by mouth Every 12 (Twelve) Hours. 180 tablet 3   • Multiple Vitamins-Minerals (CENTRUM ULTRA WOMENS) tablet Take 1 capsule by mouth Daily.     • nystatin (MYCOSTATIN) 196871 UNIT/ML suspension Take 500,000 Units by mouth As Needed.     • probiotic (CULTURELLE) capsule capsule Take 1 capsule by mouth Daily.     • simvastatin (ZOCOR) 10 MG tablet Take 10 mg by mouth Every Night.     • tiZANidine (ZANAFLEX) 4 MG tablet 4 mg 3 (Three) Times a Day.     • traMADol (ULTRAM) 50 MG tablet 50 mg 4 (Four) Times a Day.     • hydrochlorothiazide (MICROZIDE) 12.5 MG capsule Take 1 capsule by mouth Daily As Needed (for edema). 90 capsule 3   • losartan (COZAAR) 50 MG tablet Take 1 tablet by mouth Daily. 90 tablet 3     No current facility-administered medications for this visit.        Allergies:  Azelastine-fluticasone; Contrast dye; Iodides; Metformin and related; Oxycodone-aspirin; Pregabalin; Sulfa antibiotics; and Nisoldipine er    Review of Systems  Review of Systems   Constitution: Positive  "for weakness and malaise/fatigue.   HENT: Negative.    Eyes: Negative.    Cardiovascular: Positive for chest pain, dyspnea on exertion, leg swelling and palpitations. Negative for claudication, cyanosis, irregular heartbeat, near-syncope, orthopnea, paroxysmal nocturnal dyspnea and syncope.   Respiratory: Negative.    Endocrine: Negative.    Hematologic/Lymphatic: Negative.    Skin: Negative.    Musculoskeletal: Positive for arthritis and back pain.   Gastrointestinal: Positive for dysphagia and heartburn. Negative for anorexia.   Genitourinary: Negative.    Neurological: Positive for dizziness.   Psychiatric/Behavioral: Negative.        Objective     Physical Exam:  /84   Pulse 95   Ht 167.6 cm (66\")   Wt 133 kg (294 lb)   LMP  (LMP Unknown)   SpO2 98%   BMI 47.45 kg/m²   Physical Exam   Constitutional: She appears well-developed.   HENT:   Head: Normocephalic.   Neck: Normal carotid pulses and no JVD present. No tracheal tenderness present. Carotid bruit is not present. No tracheal deviation and no edema present.   Cardiovascular: Regular rhythm, normal heart sounds and normal pulses.   Pulmonary/Chest: Effort normal. No stridor.   Abdominal: Soft.     Vascular Status -  Her right foot exhibits abnormal foot edema. Her left foot exhibits abnormal foot edema.  Neurological: She is alert. She has normal strength. No cranial nerve deficit or sensory deficit.   Skin: Skin is warm.   Psychiatric: She has a normal mood and affect. Her speech is normal and behavior is normal.       Results Review:  Zio patch     · Average HR: 64. Min HR: 45. Max HR: 143.     · The predominant rhythm noted during the testing period was sinus rhythm.  · Premature atrial and ventricular contractions occured as below.  · Total ectopy burden during the monitoring period; PVCs:  <1% and APCs: <1%                   · No significant supraventricular or ventricular tachy or kaitlynn arrhythmia.  · 28    supraventricular tachycardia " episodes fastest 5    beats at maximum rate of    143    BPM   · No correlated arrhythmia  · No significant pauses  · Entire report was reviewed        Conclusion  Non obstructive  coronary artery disease with dominant RCA  Normal LVEF  LVEDP   29    mm Hg     Plan     Hydration overnight   Nephrology consult  Observation  Risk factor modifications  Workup for non cardiac causes of chest pain          ECG 12 Lead  Date/Time: 7/29/2019 10:16 AM  Performed by: Shane Singh MD  Authorized by: Shane Singh MD   Comparison: compared with previous ECG from 1/28/2019  Similar to previous ECG  Rhythm: sinus rhythm  Rate: normal  Conduction: conduction normal  ST Segments: ST segments normal  T Waves: T waves normal  QRS axis: normal  Other: no other findings    Clinical impression: normal ECG            Assessment/Plan   Patient Active Problem List   Diagnosis   • Palpitation   • Morbid obesity due to excess calories (CMS/[facility])   • BLADIMIR treated with BiPAP   • Chest pain   • Essential hypertension   • Type 2 diabetes mellitus without complication, without long-term current use of insulin (CMS/[facility])   • Mixed hyperlipidemia   • Anxiety and depression   • Other dysphagia   • Gastroesophageal reflux disease without esophagitis   • Encounter for screening for malignant neoplasm of colon   • Esophageal dysphagia   • Nonsmoker   • Obesity, unspecified obesity severity, unspecified obesity type   • Slow transit constipation   • Sleep apnea   • Obesity, Class III, BMI 40-49.9 (morbid obesity) (CMS/[facility])   • Autonomic orthostatic hypotension     Plan    Requested Prescriptions     Signed Prescriptions Disp Refills   • losartan (COZAAR) 50 MG tablet 90 tablet 3     Sig: Take 1 tablet by mouth Daily.   • hydrochlorothiazide (MICROZIDE) 12.5 MG capsule 90 capsule 3     Sig: Take 1 capsule by mouth Daily As Needed (for edema).        Take HCTZ PRN for edema   See above order    Check BP and heart rates twice daily at least 3x / week at  home and bring a recording for me to review next visit  IF BP >135/85 call sooner        ____________________________________________________________________________________________________________________________________________  Health maintenance and recommendations      Similar recommendations as last visit       Offered to give patient  a copy      Questions were encouraged, asked and answered to the patient's  understanding and satisfaction. Questions if any regarding current medications and side effects, need for refills and importance of compliance to medications stressed.    Reviewed available prior notes, consults, prior visits, laboratory findings, radiology and cardiology relevant reports. Updated chart as applicable. I have reviewed the patient's medical history in detail and updated the computerized patient record as relevant.      Updated patient regarding any new or relevant abnormalities on review of records or any new findings on physical exam. Mentioned to patient about purpose of visit and desirable health short and long term goals and objectives.    ___________________________________________________________________________________________________________________________________________         Return in about 4 months (around 11/29/2019).

## 2020-03-17 ENCOUNTER — APPOINTMENT (OUTPATIENT)
Dept: PREADMISSION TESTING | Facility: HOSPITAL | Age: 60
End: 2020-03-17

## 2020-03-17 ENCOUNTER — HOSPITAL ENCOUNTER (OUTPATIENT)
Dept: GENERAL RADIOLOGY | Facility: HOSPITAL | Age: 60
Discharge: HOME | End: 2020-03-17
Admitting: ORTHOPAEDIC SURGERY

## 2020-03-17 VITALS
HEIGHT: 66 IN | RESPIRATION RATE: 16 BRPM | DIASTOLIC BLOOD PRESSURE: 62 MMHG | WEIGHT: 276.46 LBS | HEART RATE: 65 BPM | SYSTOLIC BLOOD PRESSURE: 150 MMHG | BODY MASS INDEX: 44.43 KG/M2 | OXYGEN SATURATION: 96 %

## 2020-03-17 LAB
ALBUMIN SERPL-MCNC: 4.5 G/DL (ref 3.5–5.2)
ALBUMIN/GLOB SERPL: 1.2 G/DL
ALP SERPL-CCNC: 101 U/L (ref 39–117)
ALT SERPL W P-5'-P-CCNC: 28 U/L (ref 1–33)
ANION GAP SERPL CALCULATED.3IONS-SCNC: 15 MMOL/L (ref 5–15)
APTT PPP: 37.1 SECONDS (ref 24.1–35)
AST SERPL-CCNC: 27 U/L (ref 1–32)
BACTERIA UR QL AUTO: ABNORMAL /HPF
BASOPHILS # BLD AUTO: 0.06 10*3/MM3 (ref 0–0.2)
BASOPHILS NFR BLD AUTO: 0.5 % (ref 0–1.5)
BILIRUB SERPL-MCNC: 0.3 MG/DL (ref 0.2–1.2)
BILIRUB UR QL STRIP: NEGATIVE
BUN BLD-MCNC: 13 MG/DL (ref 8–23)
BUN/CREAT SERPL: 13.8 (ref 7–25)
CALCIUM SPEC-SCNC: 9.8 MG/DL (ref 8.6–10.5)
CHLORIDE SERPL-SCNC: 92 MMOL/L (ref 98–107)
CLARITY UR: CLEAR
CO2 SERPL-SCNC: 28 MMOL/L (ref 22–29)
COLOR UR: YELLOW
CREAT BLD-MCNC: 0.94 MG/DL (ref 0.57–1)
DEPRECATED RDW RBC AUTO: 39.6 FL (ref 37–54)
EOSINOPHIL # BLD AUTO: 0.06 10*3/MM3 (ref 0–0.4)
EOSINOPHIL NFR BLD AUTO: 0.5 % (ref 0.3–6.2)
ERYTHROCYTE [DISTWIDTH] IN BLOOD BY AUTOMATED COUNT: 12.5 % (ref 12.3–15.4)
GFR SERPL CREATININE-BSD FRML MDRD: 60 ML/MIN/1.73
GLOBULIN UR ELPH-MCNC: 3.8 GM/DL
GLUCOSE BLD-MCNC: 204 MG/DL (ref 65–99)
GLUCOSE UR STRIP-MCNC: NEGATIVE MG/DL
HCT VFR BLD AUTO: 43.1 % (ref 34–46.6)
HGB BLD-MCNC: 14.8 G/DL (ref 12–15.9)
HGB UR QL STRIP.AUTO: NEGATIVE
HYALINE CASTS UR QL AUTO: ABNORMAL /LPF
IMM GRANULOCYTES # BLD AUTO: 0.07 10*3/MM3 (ref 0–0.05)
IMM GRANULOCYTES NFR BLD AUTO: 0.6 % (ref 0–0.5)
INR PPP: 0.95 (ref 0.91–1.09)
KETONES UR QL STRIP: NEGATIVE
LEUKOCYTE ESTERASE UR QL STRIP.AUTO: ABNORMAL
LYMPHOCYTES # BLD AUTO: 2.5 10*3/MM3 (ref 0.7–3.1)
LYMPHOCYTES NFR BLD AUTO: 22.6 % (ref 19.6–45.3)
MCH RBC QN AUTO: 29.9 PG (ref 26.6–33)
MCHC RBC AUTO-ENTMCNC: 34.3 G/DL (ref 31.5–35.7)
MCV RBC AUTO: 87.1 FL (ref 79–97)
MONOCYTES # BLD AUTO: 0.49 10*3/MM3 (ref 0.1–0.9)
MONOCYTES NFR BLD AUTO: 4.4 % (ref 5–12)
NEUTROPHILS # BLD AUTO: 7.87 10*3/MM3 (ref 1.7–7)
NEUTROPHILS NFR BLD AUTO: 71.4 % (ref 42.7–76)
NITRITE UR QL STRIP: NEGATIVE
NRBC BLD AUTO-RTO: 0 /100 WBC (ref 0–0.2)
PH UR STRIP.AUTO: 6.5 [PH] (ref 5–8)
PLATELET # BLD AUTO: 431 10*3/MM3 (ref 140–450)
PMV BLD AUTO: 9.9 FL (ref 6–12)
POTASSIUM BLD-SCNC: 4 MMOL/L (ref 3.5–5.2)
PROT SERPL-MCNC: 8.3 G/DL (ref 6–8.5)
PROT UR QL STRIP: NEGATIVE
PROTHROMBIN TIME: 12.6 SECONDS (ref 11.9–14.6)
RBC # BLD AUTO: 4.95 10*6/MM3 (ref 3.77–5.28)
RBC # UR: ABNORMAL /HPF
REF LAB TEST METHOD: ABNORMAL
SODIUM BLD-SCNC: 135 MMOL/L (ref 136–145)
SP GR UR STRIP: 1.01 (ref 1–1.03)
SQUAMOUS #/AREA URNS HPF: ABNORMAL /HPF
UROBILINOGEN UR QL STRIP: ABNORMAL
WBC NRBC COR # BLD: 11.05 10*3/MM3 (ref 3.4–10.8)
WBC UR QL AUTO: ABNORMAL /HPF

## 2020-03-17 PROCEDURE — 93010 ELECTROCARDIOGRAM REPORT: CPT | Performed by: INTERNAL MEDICINE

## 2020-03-17 PROCEDURE — 85610 PROTHROMBIN TIME: CPT | Performed by: ORTHOPAEDIC SURGERY

## 2020-03-17 PROCEDURE — 71046 X-RAY EXAM CHEST 2 VIEWS: CPT

## 2020-03-17 PROCEDURE — 81001 URINALYSIS AUTO W/SCOPE: CPT | Performed by: ORTHOPAEDIC SURGERY

## 2020-03-17 PROCEDURE — 85025 COMPLETE CBC W/AUTO DIFF WBC: CPT | Performed by: ORTHOPAEDIC SURGERY

## 2020-03-17 PROCEDURE — 87086 URINE CULTURE/COLONY COUNT: CPT | Performed by: ORTHOPAEDIC SURGERY

## 2020-03-17 PROCEDURE — 85730 THROMBOPLASTIN TIME PARTIAL: CPT | Performed by: ORTHOPAEDIC SURGERY

## 2020-03-17 PROCEDURE — 93005 ELECTROCARDIOGRAM TRACING: CPT

## 2020-03-17 PROCEDURE — 80053 COMPREHEN METABOLIC PANEL: CPT | Performed by: ORTHOPAEDIC SURGERY

## 2020-03-17 PROCEDURE — 36415 COLL VENOUS BLD VENIPUNCTURE: CPT

## 2020-03-17 NOTE — DISCHARGE INSTRUCTIONS
DAY OF SURGERY INSTRUCTIONS        YOUR SURGEON: ***ALVARO STRENGE    PROCEDURE: ***ANTERIOR CERVICAL FUSION    DATE OF SURGERY: ***April 3,2020    ARRIVAL TIME: AS DIRECTED BY OFFICE    YOU MAY TAKE THE FOLLOWING MEDICATION(S) THE MORNING OF SURGERY WITH A SIP OF WATER: ***METOPROLOL, VALIUM. TRAMADOL,NORVASC      ALL OTHER HOME MEDICATION CHECK WITH YOUR PHYSICIAN      DO NOT TAKE ANY ERECTILE DYSFUNCTION MEDICATIONS (EX: CIALIS, VIAGRA) 24 HOURS PRIOR TO SURGERY                      MANAGING PAIN AFTER SURGERY    We know you are probably wondering what your pain will be like after surgery.  Following surgery it is unrealistic to expect you will not have pain.   Pain is how our bodies let us know that something is wrong or cautions us to be careful.  That said, our goal is to make your pain tolerable.    Methods we may use to treat your pain include (oral or IV medications, PCAs, epidurals, nerve blocks, etc.)   While some procedures require IV pain medications for a short time after surgery, transitioning to pain medications by mouth allows for better management of pain.   Your nurse will encourage you to take oral pain medications whenever possible.  IV medications work almost immediately, but only last a short while.  Taking medications by mouth allows for a more constant level of medication in your blood stream for a longer period of time.      Once your pain is out of control it is harder to get back under control.  It is important you are aware when your next dose of pain medication is due.  If you are admitted, your nurse may write the time of your next dose on the white board in your room to help you remember.      We are interested in your pain and encourage you to inform us about aggravating factors during your visit.   Many times a simple repositioning every few hours can make a big difference.    If your physician says it is okay, do not let your pain prevent you from getting out of bed. Be sure to  call your nurse for assistance prior to getting up so you do not fall.      Before surgery, please decide your tolerable pain goal.  These faces help describe the pain ratings we use on a 0-10 scale.   Be prepared to tell us your goal and whether or not you take pain or anxiety medications at home.          BEFORE YOU COME TO THE HOSPITAL  (Pre-op instructions)  • Do not eat, drink, smoke or chew gum after midnight the night before surgery.  This also includes no mints.  • Morning of surgery take only the medicines you have been instructed with a sip of water unless otherwise instructed  by your physician.  • Do not shave, wear makeup or dark nail polish.  • Remove all jewelry including rings.  • Leave anything you consider valuable at home.  • Leave your suitcase in the car until after your surgery.  • Bring the following with you if applicable:  o Picture ID and insurance, Medicare or Medicaid cards  o Co-pay/deductible required by insurance (cash, check, credit card)  o Copy of advance directive, living will or power-of- documents if not brought to PAT  o CPAP or BIPAP mask and tubing  o Relaxation aids ( book, magazine), etc.  o Hearing aids                        ON THE DAY OF SURGERY  · On the day of surgery check in at registration located at the main entrance of the hospital.   ? You will be registered and given a beeper with instructions where to wait in the main lobby.  ? When your beeper lights up and vibrates a member of the Outpatient Surgery staff will meet you at the double doors under the stair steps and escort you to your preoperative room.   · You may have cloth compression devices placed on your legs. These help to prevent blood clots and reduce swelling in your legs.  · An IV may be inserted into one of your veins.  · In the operating room, you may be given one or more of the following:  ? A medicine to help you relax (sedative).  ? A medicine to numb the area (local anesthetic).  ? A  "medicine to make you fall asleep (general anesthetic).  ? A medicine that is injected into an area of your body to numb everything below the injection site (regional anesthetic).  · Your surgical site will be marked or identified.  · You may be given an antibiotic through your IV to help prevent infection.  Contact a health care provider if you:  · Develop a fever of more than 100.4°F (38°C) or other feelings of illness during the 48 hours before your surgery.  · Have symptoms that get worse.  Have questions or concerns about your surgery    General Anesthesia/Surgery, Adult  General anesthesia is the use of medicines to make a person \"go to sleep\" (unconscious) for a medical procedure. General anesthesia must be used for certain procedures, and is often recommended for procedures that:  · Last a long time.  · Require you to be still or in an unusual position.  · Are major and can cause blood loss.  The medicines used for general anesthesia are called general anesthetics. As well as making you unconscious for a certain amount of time, these medicines:  · Prevent pain.  · Control your blood pressure.  · Relax your muscles.  Tell a health care provider about:  · Any allergies you have.  · All medicines you are taking, including vitamins, herbs, eye drops, creams, and over-the-counter medicines.  · Any problems you or family members have had with anesthetic medicines.  · Types of anesthetics you have had in the past.  · Any blood disorders you have.  · Any surgeries you have had.  · Any medical conditions you have.  · Any recent upper respiratory, chest, or ear infections.  · Any history of:  ? Heart or lung conditions, such as heart failure, sleep apnea, asthma, or chronic obstructive pulmonary disease (COPD).  ?  service.  ? Depression or anxiety.  · Any tobacco or drug use, including marijuana or alcohol use.  · Whether you are pregnant or may be pregnant.  What are the risks?  Generally, this is a safe " procedure. However, problems may occur, including:  · Allergic reaction.  · Lung and heart problems.  · Inhaling food or liquid from the stomach into the lungs (aspiration).  · Nerve injury.  · Air in the bloodstream, which can lead to stroke.  · Extreme agitation or confusion (delirium) when you wake up from the anesthetic.  · Waking up during your procedure and being unable to move. This is rare.  These problems are more likely to develop if you are having a major surgery or if you have an advanced or serious medical condition. You can prevent some of these complications by answering all of your health care provider's questions thoroughly and by following all instructions before your procedure.  General anesthesia can cause side effects, including:  · Nausea or vomiting.  · A sore throat from the breathing tube.  · Hoarseness.  · Wheezing or coughing.  · Shaking chills.  · Tiredness.  · Body aches.  · Anxiety.  · Sleepiness or drowsiness.  · Confusion or agitation.  RISKS AND COMPLICATIONS OF SURGERY  Your health care provider will discuss possible risks and complications with you before surgery. Common risks and complications include:    · Problems due to the use of anesthetics.  · Blood loss and replacement (does not apply to minor surgical procedures).  · Temporary increase in pain due to surgery.  · Uncorrected pain or problems that the surgery was meant to correct.  · Infection.  · New damage.    What happens before the procedure?    Medicines  Ask your health care provider about:  · Changing or stopping your regular medicines. This is especially important if you are taking diabetes medicines or blood thinners.  · Taking medicines such as aspirin and ibuprofen. These medicines can thin your blood. Do not take these medicines unless your health care provider tells you to take them.  · Taking over-the-counter medicines, vitamins, herbs, and supplements. Do not take these during the week before your procedure  unless your health care provider approves them.  General instructions  · Starting 3-6 weeks before the procedure, do not use any products that contain nicotine or tobacco, such as cigarettes and e-cigarettes. If you need help quitting, ask your health care provider.  · If you brush your teeth on the morning of the procedure, make sure to spit out all of the toothpaste.  · Tell your health care provider if you become ill or develop a cold, cough, or fever.  · If instructed by your health care provider, bring your sleep apnea device with you on the day of your surgery (if applicable).  · Ask your health care provider if you will be going home the same day, the following day, or after a longer hospital stay.  ? Plan to have someone take you home from the hospital or clinic.  ? Plan to have a responsible adult care for you for at least 24 hours after you leave the hospital or clinic. This is important.  What happens during the procedure?  · You will be given anesthetics through both of the following:  ? A mask placed over your nose and mouth.  ? An IV in one of your veins.  · You may receive a medicine to help you relax (sedative).  · After you are unconscious, a breathing tube may be inserted down your throat to help you breathe. This will be removed before you wake up.  · An anesthesia specialist will stay with you throughout your procedure. He or she will:  ? Keep you comfortable and safe by continuing to give you medicines and adjusting the amount of medicine that you get.  ? Monitor your blood pressure, pulse, and oxygen levels to make sure that the anesthetics do not cause any problems.  The procedure may vary among health care providers and hospitals.  What happens after the procedure?  · Your blood pressure, temperature, heart rate, breathing rate, and blood oxygen level will be monitored until the medicines you were given have worn off.  · You will wake up in a recovery area. You may wake up slowly.  · If you  feel anxious or agitated, you may be given medicine to help you calm down.  · If you will be going home the same day, your health care provider may check to make sure you can walk, drink, and urinate.  · Your health care provider will treat any pain or side effects you have before you go home.  · Do not drive for 24 hours if you were given a sedative.  Summary  · General anesthesia is used to keep you still and prevent pain during a procedure.  · It is important to tell your healthcare provider about your medical history and any surgeries you have had, and previous experience with anesthesia.  · Follow your healthcare provider’s instructions about when to stop eating, drinking, or taking certain medicines before your procedure.  · Plan to have someone take you home from the hospital or clinic.  This information is not intended to replace advice given to you by your health care provider. Make sure you discuss any questions you have with your health care provider.  Document Released: 03/26/2009 Document Revised: 08/03/2018 Document Reviewed: 08/03/2018  [employer] Interactive Patient Education © 2019 [employer]       Fall Prevention in Hospitals, Adult  As a hospital patient, your condition and the treatments you receive can increase your risk for falls. Some additional risk factors for falls in a hospital include:  · Being in an unfamiliar environment.  · Being on bed rest.  · Your surgery.  · Taking certain medicines.  · Your tubing requirements, such as intravenous (IV) therapy or catheters.  It is important that you learn how to decrease fall risks while at the hospital. Below are important tips that can help prevent falls.  SAFETY TIPS FOR PREVENTING FALLS  Talk about your risk of falling.  · Ask your health care provider why you are at risk for falling. Is it your medicine, illness, tubing placement, or something else?  · Make a plan with your health care provider to keep you safe from falls.  · Ask your health  care provider or pharmacist about side effects of your medicines. Some medicines can make you dizzy or affect your coordination.  Ask for help.  · Ask for help before getting out of bed. You may need to press your call button.  · Ask for assistance in getting safely to the toilet.  · Ask for a walker or cane to be put at your bedside. Ask that most of the side rails on your bed be placed up before your health care provider leaves the room.  · Ask family or friends to sit with you.  · Ask for things that are out of your reach, such as your glasses, hearing aids, telephone, bedside table, or call button.  Follow these tips to avoid falling:  · Stay lying or seated, rather than standing, while waiting for help.  · Wear rubber-soled slippers or shoes whenever you walk in the hospital.  · Avoid quick, sudden movements.  ¨ Change positions slowly.  ¨ Sit on the side of your bed before standing.  ¨ Stand up slowly and wait before you start to walk.  · Let your health care provider know if there is a spill on the floor.  · Pay careful attention to the medical equipment, electrical cords, and tubes around you.  · When you need help, use your call button by your bed or in the bathroom. Wait for one of your health care providers to help you.  · If you feel dizzy or unsure of your footing, return to bed and wait for assistance.  · Avoid being distracted by the TV, telephone, or another person in your room.  · Do not lean or support yourself on rolling objects, such as IV poles or bedside tables.     This information is not intended to replace advice given to you by your health care provider. Make sure you discuss any questions you have with your health care provider.     Document Released: 12/15/2001 Document Revised: 01/08/2016 Document Reviewed: 08/25/2013  [employer] Interactive Patient Education ©2016 [employer]       [facility] 4% Patient Instruction Sheet    Chlorhexidine Before Surgery  Chlorhexidine  gluconate (CHG) is a germ-killing (antiseptic) solution that is used to clean the skin. It gets rid of the bacteria that normally live on the skin. Cleaning your skin with CHG before surgery helps lower the risk for infection after surgery.    How to use CHG solution  · You will take 2 showers, one shower the night before surgery, the second shower the morning of surgery before coming to the hospital.  · Use CHG only as told by your health care provider, and follow the instructions on the label.  · Use CHG solution while taking a shower. Follow these steps when using CHG solution (unless your health care provider gives you different instructions):  1. Start the shower.  2. Use your normal soap and shampoo to wash your face and hair.  3. Turn off the shower or move out of the shower stream.  4. Pour the CHG onto a clean washcloth. Do not use any type of brush or rough-edged sponge.  5. Starting at your neck, lather your body down to your toes. Make sure you:  6. Pay special attention to the part of your body where you will be having surgery. Scrub this area for at least 1 minute.  7. Use the full amount of CHG as directed. Usually, this is one half bottle for each shower.  8. Do not use CHG on your head or face. If the solution gets into your ears or eyes, rinse them well with water.  9. Avoid your genital area.  10. Avoid any areas of skin that have broken skin, cuts, or scrapes.  11. Scrub your back and under your arms. Make sure to wash skin folds.  12. Let the lather sit on your skin for 1-2 minutes or as long as told by your health care  provider.  13. Thoroughly rinse your entire body in the shower. Make sure that all body creases and crevices are rinsed well.  14. Dry off with a clean towel. Do not put any substances on your body afterward, such as powder, lotion, or perfume.  15. Put on clean clothes or pajamas.  16. If it is the night before your surgery, sleep in clean sheets.    What are the risks?  Risks  of using CHG include:  · A skin reaction.  · Hearing loss, if CHG gets in your ears.  · Eye injury, if CHG gets in your eyes and is not rinsed out.  · The CHG product catching fire.  Make sure that you avoid smoking and flames after applying CHG to your skin.  Do not use CHG:  · If you have a chlorhexidine allergy or have previously reacted to chlorhexidine.  · On babies younger than 2 months of age.      On the day of surgery, when you are taken to your room in Outpatient Surgery you will be given a CHG prepackaged cloth to wipe the site for your surgery.  How to use CHG prepackaged cloths  · Follow the instructions on the label.  · Use the CHG cloth on clean, dry skin. Follow these steps when using a CHG cloth (unless your health care provider gives you different instructions):  1. Using the CHG cloth, vigorously scrub the part of your body where you will be having surgery. Scrub using a back-and-forth motion for 3 minutes. The area on your body should be completely wet with CHG when you are finished scrubbing.  2. Do not rinse. Discard the cloth and let the area air-dry for 1 minute. Do not put any substances on your body afterward, such as powder, lotion, or perfume.  Contact a health care provider if:  · Your skin gets irritated after scrubbing.  · You have questions about using your solution or cloth.  Get help right away if:  · Your eyes become very red or swollen.  · Your eyes itch badly.  · Your skin itches badly and is red or swollen.  · Your hearing changes.  · You have trouble seeing.  · You have swelling or tingling in your mouth or throat.  · You have trouble breathing.  · You swallow any chlorhexidine.  Summary  · Chlorhexidine gluconate (CHG) is a germ-killing (antiseptic) solution that is used to clean the skin. Cleaning your skin with CHG before surgery helps lower the risk for infection after surgery.  · You may be given CHG to use at home. It may be in a bottle or in a prepackaged cloth to use on  your skin. Carefully follow your health care provider's instructions and the instructions on the product label.  · Do not use CHG if you have a chlorhexidine allergy.  · Contact your health care provider if your skin gets irritated after scrubbing.  This information is not intended to replace advice given to you by your health care provider. Make sure you discuss any questions you have with your health care provider.  Document Released: 09/11/2013 Document Revised: 11/15/2018 Document Reviewed: 11/15/2018  [employer] Interactive Patient Education © 2019 [employer]          PATIENT/FAMILY/RESPONSIBLE PARTY VERBALIZES UNDERSTANDING OF ABOVE EDUCATION.  COPY OF PAIN SCALE GIVEN AND REVIEWED WITH VERBALIZED UNDERSTANDING.

## 2020-03-18 LAB — BACTERIA SPEC AEROBE CULT: NORMAL

## 2020-05-07 ENCOUNTER — APPOINTMENT (OUTPATIENT)
Dept: PREADMISSION TESTING | Facility: HOSPITAL | Age: 60
End: 2020-05-07

## 2020-05-07 ENCOUNTER — HOSPITAL ENCOUNTER (OUTPATIENT)
Dept: GENERAL RADIOLOGY | Facility: HOSPITAL | Age: 60
Discharge: HOME | End: 2020-05-07
Admitting: ORTHOPAEDIC SURGERY

## 2020-05-07 VITALS
HEIGHT: 67 IN | BODY MASS INDEX: 43.94 KG/M2 | SYSTOLIC BLOOD PRESSURE: 211 MMHG | RESPIRATION RATE: 16 BRPM | DIASTOLIC BLOOD PRESSURE: 80 MMHG | HEART RATE: 73 BPM | WEIGHT: 279.98 LBS | OXYGEN SATURATION: 96 %

## 2020-05-07 LAB
ALBUMIN SERPL-MCNC: 4.2 G/DL (ref 3.5–5.2)
ALBUMIN/GLOB SERPL: 1.2 G/DL
ALP SERPL-CCNC: 91 U/L (ref 39–117)
ALT SERPL W P-5'-P-CCNC: 27 U/L (ref 1–33)
ANION GAP SERPL CALCULATED.3IONS-SCNC: 11 MMOL/L (ref 5–15)
APTT PPP: 30.9 SECONDS (ref 24.1–35)
AST SERPL-CCNC: 31 U/L (ref 1–32)
BILIRUB SERPL-MCNC: 0.2 MG/DL (ref 0.2–1.2)
BILIRUB UR QL STRIP: NEGATIVE
BUN BLD-MCNC: 12 MG/DL (ref 8–23)
BUN/CREAT SERPL: 14 (ref 7–25)
CALCIUM SPEC-SCNC: 9.6 MG/DL (ref 8.6–10.5)
CHLORIDE SERPL-SCNC: 96 MMOL/L (ref 98–107)
CLARITY UR: CLEAR
CO2 SERPL-SCNC: 28 MMOL/L (ref 22–29)
COLOR UR: YELLOW
CREAT BLD-MCNC: 0.86 MG/DL (ref 0.57–1)
DEPRECATED RDW RBC AUTO: 40.3 FL (ref 37–54)
ERYTHROCYTE [DISTWIDTH] IN BLOOD BY AUTOMATED COUNT: 12.7 % (ref 12.3–15.4)
GFR SERPL CREATININE-BSD FRML MDRD: 67 ML/MIN/1.73
GLOBULIN UR ELPH-MCNC: 3.5 GM/DL
GLUCOSE BLD-MCNC: 264 MG/DL (ref 65–99)
GLUCOSE UR STRIP-MCNC: ABNORMAL MG/DL
HCT VFR BLD AUTO: 38.7 % (ref 34–46.6)
HGB BLD-MCNC: 13.3 G/DL (ref 12–15.9)
HGB UR QL STRIP.AUTO: NEGATIVE
INR PPP: 1.01 (ref 0.91–1.09)
KETONES UR QL STRIP: NEGATIVE
LEUKOCYTE ESTERASE UR QL STRIP.AUTO: NEGATIVE
MCH RBC QN AUTO: 30 PG (ref 26.6–33)
MCHC RBC AUTO-ENTMCNC: 34.4 G/DL (ref 31.5–35.7)
MCV RBC AUTO: 87.2 FL (ref 79–97)
NITRITE UR QL STRIP: NEGATIVE
PH UR STRIP.AUTO: 6.5 [PH] (ref 5–8)
PLATELET # BLD AUTO: 390 10*3/MM3 (ref 140–450)
PMV BLD AUTO: 9.6 FL (ref 6–12)
POTASSIUM BLD-SCNC: 4.3 MMOL/L (ref 3.5–5.2)
PROT SERPL-MCNC: 7.7 G/DL (ref 6–8.5)
PROT UR QL STRIP: NEGATIVE
PROTHROMBIN TIME: 12.9 SECONDS (ref 11.9–14.6)
RBC # BLD AUTO: 4.44 10*6/MM3 (ref 3.77–5.28)
SODIUM BLD-SCNC: 135 MMOL/L (ref 136–145)
SP GR UR STRIP: 1.03 (ref 1–1.03)
UROBILINOGEN UR QL STRIP: ABNORMAL
WBC NRBC COR # BLD: 9.92 10*3/MM3 (ref 3.4–10.8)

## 2020-05-07 PROCEDURE — 81003 URINALYSIS AUTO W/O SCOPE: CPT | Performed by: ORTHOPAEDIC SURGERY

## 2020-05-07 PROCEDURE — 93010 ELECTROCARDIOGRAM REPORT: CPT | Performed by: INTERNAL MEDICINE

## 2020-05-07 PROCEDURE — 85610 PROTHROMBIN TIME: CPT | Performed by: ORTHOPAEDIC SURGERY

## 2020-05-07 PROCEDURE — 36415 COLL VENOUS BLD VENIPUNCTURE: CPT

## 2020-05-07 PROCEDURE — 85730 THROMBOPLASTIN TIME PARTIAL: CPT | Performed by: ORTHOPAEDIC SURGERY

## 2020-05-07 PROCEDURE — 71045 X-RAY EXAM CHEST 1 VIEW: CPT

## 2020-05-07 PROCEDURE — 93005 ELECTROCARDIOGRAM TRACING: CPT

## 2020-05-07 PROCEDURE — 85027 COMPLETE CBC AUTOMATED: CPT | Performed by: ORTHOPAEDIC SURGERY

## 2020-05-07 PROCEDURE — 80053 COMPREHEN METABOLIC PANEL: CPT | Performed by: ORTHOPAEDIC SURGERY

## 2020-05-07 NOTE — DISCHARGE INSTRUCTIONS
DAY OF SURGERY INSTRUCTIONS        YOUR SURGEON: Dr. Blanchard    PROCEDURE: Anterior Cervical Discectomy Fusion Cervical 5-7    DATE OF SURGERY: May 14, 2020    ARRIVAL TIME: AS DIRECTED BY OFFICE    YOU MAY TAKE THE FOLLOWING MEDICATION(S) THE MORNING OF SURGERY WITH A SIP OF WATER: Amlodipine, Diazepam, Metoprolol, Tramadol      ALL OTHER HOME MEDICATION CHECK WITH YOUR PHYSICIAN      DO NOT TAKE ANY ERECTILE DYSFUNCTION MEDICATIONS (EX: CIALIS, VIAGRA) 24 HOURS PRIOR TO SURGERY                      MANAGING PAIN AFTER SURGERY    We know you are probably wondering what your pain will be like after surgery.  Following surgery it is unrealistic to expect you will not have pain.   Pain is how our bodies let us know that something is wrong or cautions us to be careful.  That said, our goal is to make your pain tolerable.    Methods we may use to treat your pain include (oral or IV medications, PCAs, epidurals, nerve blocks, etc.)   While some procedures require IV pain medications for a short time after surgery, transitioning to pain medications by mouth allows for better management of pain.   Your nurse will encourage you to take oral pain medications whenever possible.  IV medications work almost immediately, but only last a short while.  Taking medications by mouth allows for a more constant level of medication in your blood stream for a longer period of time.      Once your pain is out of control it is harder to get back under control.  It is important you are aware when your next dose of pain medication is due.  If you are admitted, your nurse may write the time of your next dose on the white board in your room to help you remember.      We are interested in your pain and encourage you to inform us about aggravating factors during your visit.   Many times a simple repositioning every few hours can make a big difference.    If your physician says it is okay, do not let your pain prevent you from getting out of  bed. Be sure to call your nurse for assistance prior to getting up so you do not fall.      Before surgery, please decide your tolerable pain goal.  These faces help describe the pain ratings we use on a 0-10 scale.   Be prepared to tell us your goal and whether or not you take pain or anxiety medications at home.          BEFORE YOU COME TO THE HOSPITAL  (Pre-op instructions)  • Do not eat, drink, smoke or chew gum after midnight the night before surgery.  This also includes no mints.  • Morning of surgery take only the medicines you have been instructed with a sip of water unless otherwise instructed  by your physician.  • Do not shave, wear makeup or dark nail polish.  • Remove all jewelry including rings.  • Leave anything you consider valuable at home.  • Leave your suitcase in the car until after your surgery.  • Bring the following with you if applicable:  o Picture ID and insurance, Medicare or Medicaid cards  o Co-pay/deductible required by insurance (cash, check, credit card)  o Copy of advance directive, living will or power-of- documents if not brought to PAT  o CPAP or BIPAP mask and tubing  o Relaxation aids ( book, magazine), etc.  o Hearing aids                        ON THE DAY OF SURGERY  · On the day of surgery check in at registration located at the main entrance of the hospital.   ? You will be registered and given a beeper with instructions where to wait in the main lobby.  ? When your beeper lights up and vibrates a member of the Outpatient Surgery staff will meet you at the double doors under the stair steps and escort you to your preoperative room.   · You may have cloth compression devices placed on your legs. These help to prevent blood clots and reduce swelling in your legs.  · An IV may be inserted into one of your veins.  · In the operating room, you may be given one or more of the following:  ? A medicine to help you relax (sedative).  ? A medicine to numb the area (local  "anesthetic).  ? A medicine to make you fall asleep (general anesthetic).  ? A medicine that is injected into an area of your body to numb everything below the injection site (regional anesthetic).  · Your surgical site will be marked or identified.  · You may be given an antibiotic through your IV to help prevent infection.  Contact a health care provider if you:  · Develop a fever of more than 100.4°F (38°C) or other feelings of illness during the 48 hours before your surgery.  · Have symptoms that get worse.  Have questions or concerns about your surgery    General Anesthesia/Surgery, Adult  General anesthesia is the use of medicines to make a person \"go to sleep\" (unconscious) for a medical procedure. General anesthesia must be used for certain procedures, and is often recommended for procedures that:  · Last a long time.  · Require you to be still or in an unusual position.  · Are major and can cause blood loss.  The medicines used for general anesthesia are called general anesthetics. As well as making you unconscious for a certain amount of time, these medicines:  · Prevent pain.  · Control your blood pressure.  · Relax your muscles.  Tell a health care provider about:  · Any allergies you have.  · All medicines you are taking, including vitamins, herbs, eye drops, creams, and over-the-counter medicines.  · Any problems you or family members have had with anesthetic medicines.  · Types of anesthetics you have had in the past.  · Any blood disorders you have.  · Any surgeries you have had.  · Any medical conditions you have.  · Any recent upper respiratory, chest, or ear infections.  · Any history of:  ? Heart or lung conditions, such as heart failure, sleep apnea, asthma, or chronic obstructive pulmonary disease (COPD).  ?  service.  ? Depression or anxiety.  · Any tobacco or drug use, including marijuana or alcohol use.  · Whether you are pregnant or may be pregnant.  What are the risks?  Generally, " this is a safe procedure. However, problems may occur, including:  · Allergic reaction.  · Lung and heart problems.  · Inhaling food or liquid from the stomach into the lungs (aspiration).  · Nerve injury.  · Air in the bloodstream, which can lead to stroke.  · Extreme agitation or confusion (delirium) when you wake up from the anesthetic.  · Waking up during your procedure and being unable to move. This is rare.  These problems are more likely to develop if you are having a major surgery or if you have an advanced or serious medical condition. You can prevent some of these complications by answering all of your health care provider's questions thoroughly and by following all instructions before your procedure.  General anesthesia can cause side effects, including:  · Nausea or vomiting.  · A sore throat from the breathing tube.  · Hoarseness.  · Wheezing or coughing.  · Shaking chills.  · Tiredness.  · Body aches.  · Anxiety.  · Sleepiness or drowsiness.  · Confusion or agitation.  RISKS AND COMPLICATIONS OF SURGERY  Your health care provider will discuss possible risks and complications with you before surgery. Common risks and complications include:    · Problems due to the use of anesthetics.  · Blood loss and replacement (does not apply to minor surgical procedures).  · Temporary increase in pain due to surgery.  · Uncorrected pain or problems that the surgery was meant to correct.  · Infection.  · New damage.    What happens before the procedure?    Medicines  Ask your health care provider about:  · Changing or stopping your regular medicines. This is especially important if you are taking diabetes medicines or blood thinners.  · Taking medicines such as aspirin and ibuprofen. These medicines can thin your blood. Do not take these medicines unless your health care provider tells you to take them.  · Taking over-the-counter medicines, vitamins, herbs, and supplements. Do not take these during the week before  your procedure unless your health care provider approves them.  General instructions  · Starting 3-6 weeks before the procedure, do not use any products that contain nicotine or tobacco, such as cigarettes and e-cigarettes. If you need help quitting, ask your health care provider.  · If you brush your teeth on the morning of the procedure, make sure to spit out all of the toothpaste.  · Tell your health care provider if you become ill or develop a cold, cough, or fever.  · If instructed by your health care provider, bring your sleep apnea device with you on the day of your surgery (if applicable).  · Ask your health care provider if you will be going home the same day, the following day, or after a longer hospital stay.  ? Plan to have someone take you home from the hospital or clinic.  ? Plan to have a responsible adult care for you for at least 24 hours after you leave the hospital or clinic. This is important.  What happens during the procedure?  · You will be given anesthetics through both of the following:  ? A mask placed over your nose and mouth.  ? An IV in one of your veins.  · You may receive a medicine to help you relax (sedative).  · After you are unconscious, a breathing tube may be inserted down your throat to help you breathe. This will be removed before you wake up.  · An anesthesia specialist will stay with you throughout your procedure. He or she will:  ? Keep you comfortable and safe by continuing to give you medicines and adjusting the amount of medicine that you get.  ? Monitor your blood pressure, pulse, and oxygen levels to make sure that the anesthetics do not cause any problems.  The procedure may vary among health care providers and hospitals.  What happens after the procedure?  · Your blood pressure, temperature, heart rate, breathing rate, and blood oxygen level will be monitored until the medicines you were given have worn off.  · You will wake up in a recovery area. You may wake up  slowly.  · If you feel anxious or agitated, you may be given medicine to help you calm down.  · If you will be going home the same day, your health care provider may check to make sure you can walk, drink, and urinate.  · Your health care provider will treat any pain or side effects you have before you go home.  · Do not drive for 24 hours if you were given a sedative.  Summary  · General anesthesia is used to keep you still and prevent pain during a procedure.  · It is important to tell your healthcare provider about your medical history and any surgeries you have had, and previous experience with anesthesia.  · Follow your healthcare provider’s instructions about when to stop eating, drinking, or taking certain medicines before your procedure.  · Plan to have someone take you home from the hospital or clinic.  This information is not intended to replace advice given to you by your health care provider. Make sure you discuss any questions you have with your health care provider.  Document Released: 03/26/2009 Document Revised: 08/03/2018 Document Reviewed: 08/03/2018  [employer] Interactive Patient Education © 2019 [employer]       Fall Prevention in Hospitals, Adult  As a hospital patient, your condition and the treatments you receive can increase your risk for falls. Some additional risk factors for falls in a hospital include:  · Being in an unfamiliar environment.  · Being on bed rest.  · Your surgery.  · Taking certain medicines.  · Your tubing requirements, such as intravenous (IV) therapy or catheters.  It is important that you learn how to decrease fall risks while at the hospital. Below are important tips that can help prevent falls.  SAFETY TIPS FOR PREVENTING FALLS  Talk about your risk of falling.  · Ask your health care provider why you are at risk for falling. Is it your medicine, illness, tubing placement, or something else?  · Make a plan with your health care provider to keep you safe from  falls.  · Ask your health care provider or pharmacist about side effects of your medicines. Some medicines can make you dizzy or affect your coordination.  Ask for help.  · Ask for help before getting out of bed. You may need to press your call button.  · Ask for assistance in getting safely to the toilet.  · Ask for a walker or cane to be put at your bedside. Ask that most of the side rails on your bed be placed up before your health care provider leaves the room.  · Ask family or friends to sit with you.  · Ask for things that are out of your reach, such as your glasses, hearing aids, telephone, bedside table, or call button.  Follow these tips to avoid falling:  · Stay lying or seated, rather than standing, while waiting for help.  · Wear rubber-soled slippers or shoes whenever you walk in the hospital.  · Avoid quick, sudden movements.  ¨ Change positions slowly.  ¨ Sit on the side of your bed before standing.  ¨ Stand up slowly and wait before you start to walk.  · Let your health care provider know if there is a spill on the floor.  · Pay careful attention to the medical equipment, electrical cords, and tubes around you.  · When you need help, use your call button by your bed or in the bathroom. Wait for one of your health care providers to help you.  · If you feel dizzy or unsure of your footing, return to bed and wait for assistance.  · Avoid being distracted by the TV, telephone, or another person in your room.  · Do not lean or support yourself on rolling objects, such as IV poles or bedside tables.     This information is not intended to replace advice given to you by your health care provider. Make sure you discuss any questions you have with your health care provider.     Document Released: 12/15/2001 Document Revised: 01/08/2016 Document Reviewed: 08/25/2013  [employer] Interactive Patient Education ©2016 [employer]       [facility] 4% Patient Instruction Sheet    Chlorhexidine Before  Surgery  Chlorhexidine gluconate (CHG) is a germ-killing (antiseptic) solution that is used to clean the skin. It gets rid of the bacteria that normally live on the skin. Cleaning your skin with CHG before surgery helps lower the risk for infection after surgery.    How to use CHG solution  · You will take 2 showers, one shower the night before surgery, the second shower the morning of surgery before coming to the hospital.  · Use CHG only as told by your health care provider, and follow the instructions on the label.  · Use CHG solution while taking a shower. Follow these steps when using CHG solution (unless your health care provider gives you different instructions):  1. Start the shower.  2. Use your normal soap and shampoo to wash your face and hair.  3. Turn off the shower or move out of the shower stream.  4. Pour the CHG onto a clean washcloth. Do not use any type of brush or rough-edged sponge.  5. Starting at your neck, lather your body down to your toes. Make sure you:  6. Pay special attention to the part of your body where you will be having surgery. Scrub this area for at least 1 minute.  7. Use the full amount of CHG as directed. Usually, this is one half bottle for each shower.  8. Do not use CHG on your head or face. If the solution gets into your ears or eyes, rinse them well with water.  9. Avoid your genital area.  10. Avoid any areas of skin that have broken skin, cuts, or scrapes.  11. Scrub your back and under your arms. Make sure to wash skin folds.  12. Let the lather sit on your skin for 1-2 minutes or as long as told by your health care  provider.  13. Thoroughly rinse your entire body in the shower. Make sure that all body creases and crevices are rinsed well.  14. Dry off with a clean towel. Do not put any substances on your body afterward, such as powder, lotion, or perfume.  15. Put on clean clothes or pajamas.  16. If it is the night before your surgery, sleep in clean sheets.    What  are the risks?  Risks of using CHG include:  · A skin reaction.  · Hearing loss, if CHG gets in your ears.  · Eye injury, if CHG gets in your eyes and is not rinsed out.  · The CHG product catching fire.  Make sure that you avoid smoking and flames after applying CHG to your skin.  Do not use CHG:  · If you have a chlorhexidine allergy or have previously reacted to chlorhexidine.  · On babies younger than 2 months of age.      On the day of surgery, when you are taken to your room in Outpatient Surgery you will be given a CHG prepackaged cloth to wipe the site for your surgery.  How to use CHG prepackaged cloths  · Follow the instructions on the label.  · Use the CHG cloth on clean, dry skin. Follow these steps when using a CHG cloth (unless your health care provider gives you different instructions):  1. Using the CHG cloth, vigorously scrub the part of your body where you will be having surgery. Scrub using a back-and-forth motion for 3 minutes. The area on your body should be completely wet with CHG when you are finished scrubbing.  2. Do not rinse. Discard the cloth and let the area air-dry for 1 minute. Do not put any substances on your body afterward, such as powder, lotion, or perfume.  Contact a health care provider if:  · Your skin gets irritated after scrubbing.  · You have questions about using your solution or cloth.  Get help right away if:  · Your eyes become very red or swollen.  · Your eyes itch badly.  · Your skin itches badly and is red or swollen.  · Your hearing changes.  · You have trouble seeing.  · You have swelling or tingling in your mouth or throat.  · You have trouble breathing.  · You swallow any chlorhexidine.  Summary  · Chlorhexidine gluconate (CHG) is a germ-killing (antiseptic) solution that is used to clean the skin. Cleaning your skin with CHG before surgery helps lower the risk for infection after surgery.  · You may be given CHG to use at home. It may be in a bottle or in a  prepackaged cloth to use on your skin. Carefully follow your health care provider's instructions and the instructions on the product label.  · Do not use CHG if you have a chlorhexidine allergy.  · Contact your health care provider if your skin gets irritated after scrubbing.  This information is not intended to replace advice given to you by your health care provider. Make sure you discuss any questions you have with your health care provider.  Document Released: 09/11/2013 Document Revised: 11/15/2018 Document Reviewed: 11/15/2018  [employer] Interactive Patient Education © 2019 [employer]          PATIENT/FAMILY/RESPONSIBLE PARTY VERBALIZES UNDERSTANDING OF ABOVE EDUCATION.  COPY OF PAIN SCALE GIVEN AND REVIEWED WITH VERBALIZED UNDERSTANDING.

## 2020-05-08 ENCOUNTER — TRANSCRIBE ORDERS (OUTPATIENT)
Dept: ADMINISTRATIVE | Facility: HOSPITAL | Age: 60
End: 2020-05-08

## 2020-05-08 DIAGNOSIS — Z01.818 PRE-OP TESTING: Primary | ICD-10-CM

## 2020-05-11 ENCOUNTER — APPOINTMENT (OUTPATIENT)
Dept: LAB | Facility: HOSPITAL | Age: 60
End: 2020-05-11

## 2020-05-11 PROCEDURE — U0003 INFECTIOUS AGENT DETECTION BY NUCLEIC ACID (DNA OR RNA); SEVERE ACUTE RESPIRATORY SYNDROME CORONAVIRUS 2 (SARS-COV-2) (CORONAVIRUS DISEASE [COVID-19]), AMPLIFIED PROBE TECHNIQUE, MAKING USE OF HIGH THROUGHPUT TECHNOLOGIES AS DESCRIBED BY CMS-2020-01-R: HCPCS | Performed by: ORTHOPAEDIC SURGERY

## 2020-05-12 LAB
COVID LABCORP PRIORITY: NORMAL
SARS-COV-2 RNA RESP QL NAA+PROBE: NOT DETECTED

## 2020-05-14 ENCOUNTER — HOSPITAL ENCOUNTER (INPATIENT)
Facility: HOSPITAL | Age: 60
LOS: 1 days | Discharge: HOME | End: 2020-05-15
Attending: ORTHOPAEDIC SURGERY | Admitting: ORTHOPAEDIC SURGERY

## 2020-05-14 ENCOUNTER — ANESTHESIA EVENT (OUTPATIENT)
Dept: PERIOP | Facility: HOSPITAL | Age: 60
End: 2020-05-14

## 2020-05-14 ENCOUNTER — APPOINTMENT (OUTPATIENT)
Dept: GENERAL RADIOLOGY | Facility: HOSPITAL | Age: 60
End: 2020-05-14

## 2020-05-14 ENCOUNTER — ANESTHESIA (OUTPATIENT)
Dept: PERIOP | Facility: HOSPITAL | Age: 60
End: 2020-05-14

## 2020-05-14 DIAGNOSIS — Z78.9 DECREASED ACTIVITIES OF DAILY LIVING (ADL): ICD-10-CM

## 2020-05-14 DIAGNOSIS — M54.2 CERVICALGIA: Primary | ICD-10-CM

## 2020-05-14 DIAGNOSIS — R26.2 DIFFICULTY WALKING: ICD-10-CM

## 2020-05-14 PROBLEM — M47.22 CERVICAL RADICULOPATHY DUE TO DEGENERATIVE JOINT DISEASE OF SPINE: Status: ACTIVE | Noted: 2020-05-14

## 2020-05-14 LAB
ABO GROUP BLD: NORMAL
ANION GAP SERPL CALCULATED.3IONS-SCNC: 13 MMOL/L (ref 5–15)
BLD GP AB SCN SERPL QL: NEGATIVE
BUN BLD-MCNC: 11 MG/DL (ref 8–23)
BUN/CREAT SERPL: 13.9 (ref 7–25)
CALCIUM SPEC-SCNC: 9.4 MG/DL (ref 8.6–10.5)
CHLORIDE SERPL-SCNC: 94 MMOL/L (ref 98–107)
CO2 SERPL-SCNC: 25 MMOL/L (ref 22–29)
CREAT BLD-MCNC: 0.79 MG/DL (ref 0.57–1)
GFR SERPL CREATININE-BSD FRML MDRD: 74 ML/MIN/1.73
GLUCOSE BLD-MCNC: 300 MG/DL (ref 65–99)
GLUCOSE BLDC GLUCOMTR-MCNC: 260 MG/DL (ref 70–130)
GLUCOSE BLDC GLUCOMTR-MCNC: 265 MG/DL (ref 70–130)
GLUCOSE BLDC GLUCOMTR-MCNC: 294 MG/DL (ref 70–130)
GLUCOSE BLDC GLUCOMTR-MCNC: 323 MG/DL (ref 70–130)
MAGNESIUM SERPL-MCNC: 1.9 MG/DL (ref 1.6–2.4)
POTASSIUM BLD-SCNC: 4.3 MMOL/L (ref 3.5–5.2)
RH BLD: POSITIVE
SODIUM BLD-SCNC: 132 MMOL/L (ref 136–145)
T&S EXPIRATION DATE: NORMAL
TROPONIN T SERPL-MCNC: <0.01 NG/ML (ref 0–0.03)

## 2020-05-14 PROCEDURE — 97165 OT EVAL LOW COMPLEX 30 MIN: CPT | Performed by: OCCUPATIONAL THERAPIST

## 2020-05-14 PROCEDURE — 4A11X4G MONITORING OF PERIPHERAL NERVOUS ELECTRICAL ACTIVITY, INTRAOPERATIVE, EXTERNAL APPROACH: ICD-10-PCS | Performed by: ORTHOPAEDIC SURGERY

## 2020-05-14 PROCEDURE — 83735 ASSAY OF MAGNESIUM: CPT | Performed by: INTERNAL MEDICINE

## 2020-05-14 PROCEDURE — C1713 ANCHOR/SCREW BN/BN,TIS/BN: HCPCS | Performed by: ORTHOPAEDIC SURGERY

## 2020-05-14 PROCEDURE — 86901 BLOOD TYPING SEROLOGIC RH(D): CPT | Performed by: ORTHOPAEDIC SURGERY

## 2020-05-14 PROCEDURE — 25010000002 FENTANYL CITRATE (PF) 100 MCG/2ML SOLUTION: Performed by: NURSE ANESTHETIST, CERTIFIED REGISTERED

## 2020-05-14 PROCEDURE — 0RB30ZZ EXCISION OF CERVICAL VERTEBRAL DISC, OPEN APPROACH: ICD-10-PCS | Performed by: ORTHOPAEDIC SURGERY

## 2020-05-14 PROCEDURE — 25010000003 CEFAZOLIN SODIUM-DEXTROSE 2-3 GM-%(50ML) RECONSTITUTED SOLUTION: Performed by: ORTHOPAEDIC SURGERY

## 2020-05-14 PROCEDURE — 97162 PT EVAL MOD COMPLEX 30 MIN: CPT

## 2020-05-14 PROCEDURE — 84484 ASSAY OF TROPONIN QUANT: CPT | Performed by: INTERNAL MEDICINE

## 2020-05-14 PROCEDURE — 25010000002 DEXAMETHASONE PER 1 MG: Performed by: ANESTHESIOLOGY

## 2020-05-14 PROCEDURE — 0RG20A0 FUSION OF 2 OR MORE CERVICAL VERTEBRAL JOINTS WITH INTERBODY FUSION DEVICE, ANTERIOR APPROACH, ANTERIOR COLUMN, OPEN APPROACH: ICD-10-PCS | Performed by: ORTHOPAEDIC SURGERY

## 2020-05-14 PROCEDURE — 63710000001 INSULIN DETEMIR PER 5 UNITS: Performed by: ORTHOPAEDIC SURGERY

## 2020-05-14 PROCEDURE — 86850 RBC ANTIBODY SCREEN: CPT | Performed by: ORTHOPAEDIC SURGERY

## 2020-05-14 PROCEDURE — 25010000002 SUCCINYLCHOLINE PER 20 MG: Performed by: NURSE ANESTHETIST, CERTIFIED REGISTERED

## 2020-05-14 PROCEDURE — 82962 GLUCOSE BLOOD TEST: CPT

## 2020-05-14 PROCEDURE — 86900 BLOOD TYPING SEROLOGIC ABO: CPT | Performed by: ORTHOPAEDIC SURGERY

## 2020-05-14 PROCEDURE — 93010 ELECTROCARDIOGRAM REPORT: CPT | Performed by: INTERNAL MEDICINE

## 2020-05-14 PROCEDURE — 25010000003 HYDROMORPHONE 1 MG/ML SOLUTION: Performed by: ORTHOPAEDIC SURGERY

## 2020-05-14 PROCEDURE — 94799 UNLISTED PULMONARY SVC/PX: CPT

## 2020-05-14 PROCEDURE — 25010000002 ONDANSETRON PER 1 MG: Performed by: NURSE ANESTHETIST, CERTIFIED REGISTERED

## 2020-05-14 PROCEDURE — 25010000002 PROPOFOL 10 MG/ML EMULSION: Performed by: NURSE ANESTHETIST, CERTIFIED REGISTERED

## 2020-05-14 PROCEDURE — L0174 CERV SR 2PC THOR EXT PRE OTS: HCPCS | Performed by: ORTHOPAEDIC SURGERY

## 2020-05-14 PROCEDURE — 94640 AIRWAY INHALATION TREATMENT: CPT

## 2020-05-14 PROCEDURE — 80048 BASIC METABOLIC PNL TOTAL CA: CPT | Performed by: INTERNAL MEDICINE

## 2020-05-14 PROCEDURE — 93005 ELECTROCARDIOGRAM TRACING: CPT | Performed by: INTERNAL MEDICINE

## 2020-05-14 PROCEDURE — 71045 X-RAY EXAM CHEST 1 VIEW: CPT

## 2020-05-14 PROCEDURE — 72040 X-RAY EXAM NECK SPINE 2-3 VW: CPT

## 2020-05-14 PROCEDURE — 76000 FLUOROSCOPY <1 HR PHYS/QHP: CPT

## 2020-05-14 PROCEDURE — 01N10ZZ RELEASE CERVICAL NERVE, OPEN APPROACH: ICD-10-PCS | Performed by: ORTHOPAEDIC SURGERY

## 2020-05-14 PROCEDURE — 63710000001 INSULIN LISPRO (HUMAN) PER 5 UNITS: Performed by: ORTHOPAEDIC SURGERY

## 2020-05-14 DEVICE — 9MM SM ADJUSTABLE LORDOTIC IMPLANT, HIJAK™ CERVICAL
Type: IMPLANTABLE DEVICE | Status: FUNCTIONAL
Brand: HIJAK™ CERVICAL

## 2020-05-14 DEVICE — 14MM VARIABLE SCREW
Type: IMPLANTABLE DEVICE | Status: FUNCTIONAL
Brand: V3™ SEGMENTAL PLATING SYSTEM

## 2020-05-14 DEVICE — 8MM SM ADJUSTABLE LORDOTIC IMPLANT, HIJAK™ CERVICAL
Type: IMPLANTABLE DEVICE | Status: FUNCTIONAL
Brand: HIJAK™ CERVICAL

## 2020-05-14 DEVICE — ALLOGRFT FLD BIOBURST 1ML: Type: IMPLANTABLE DEVICE | Status: FUNCTIONAL

## 2020-05-14 DEVICE — 11MM PLATE ASSEMBLY
Type: IMPLANTABLE DEVICE | Status: FUNCTIONAL
Brand: V3™ SEGMENTAL PLATING SYSTEM

## 2020-05-14 DEVICE — BONE CANC CRUSHED 5CC 1TO4MM: Type: IMPLANTABLE DEVICE | Status: FUNCTIONAL

## 2020-05-14 DEVICE — 13MM PLATE ASSEMBLY
Type: IMPLANTABLE DEVICE | Status: FUNCTIONAL
Brand: V3™ SEGMENTAL PLATING SYSTEM

## 2020-05-14 RX ORDER — SODIUM CHLORIDE 0.9 % (FLUSH) 0.9 %
3 SYRINGE (ML) INJECTION EVERY 12 HOURS SCHEDULED
Status: DISCONTINUED | OUTPATIENT
Start: 2020-05-14 | End: 2020-05-15 | Stop reason: HOSPADM

## 2020-05-14 RX ORDER — DIAZEPAM 5 MG/1
5 TABLET ORAL EVERY 8 HOURS PRN
Status: DISCONTINUED | OUTPATIENT
Start: 2020-05-14 | End: 2020-05-15 | Stop reason: HOSPADM

## 2020-05-14 RX ORDER — SODIUM CHLORIDE 0.9 % (FLUSH) 0.9 %
10 SYRINGE (ML) INJECTION AS NEEDED
Status: DISCONTINUED | OUTPATIENT
Start: 2020-05-14 | End: 2020-05-14 | Stop reason: HOSPADM

## 2020-05-14 RX ORDER — LABETALOL HYDROCHLORIDE 5 MG/ML
5 INJECTION, SOLUTION INTRAVENOUS
Status: DISCONTINUED | OUTPATIENT
Start: 2020-05-14 | End: 2020-05-14 | Stop reason: HOSPADM

## 2020-05-14 RX ORDER — ALUMINA, MAGNESIA, AND SIMETHICONE 2400; 2400; 240 MG/30ML; MG/30ML; MG/30ML
30 SUSPENSION ORAL ONCE
Status: COMPLETED | OUTPATIENT
Start: 2020-05-14 | End: 2020-05-14

## 2020-05-14 RX ORDER — CEFAZOLIN SODIUM 2 G/50ML
2 SOLUTION INTRAVENOUS EVERY 8 HOURS
Status: COMPLETED | OUTPATIENT
Start: 2020-05-14 | End: 2020-05-15

## 2020-05-14 RX ORDER — L.ACID,PARA/B.BIFIDUM/S.THERM 8B CELL
1 CAPSULE ORAL DAILY
Status: DISCONTINUED | OUTPATIENT
Start: 2020-05-14 | End: 2020-05-15 | Stop reason: HOSPADM

## 2020-05-14 RX ORDER — LIDOCAINE HYDROCHLORIDE 10 MG/ML
0.5 INJECTION, SOLUTION EPIDURAL; INFILTRATION; INTRACAUDAL; PERINEURAL ONCE AS NEEDED
Status: DISCONTINUED | OUTPATIENT
Start: 2020-05-14 | End: 2020-05-14 | Stop reason: HOSPADM

## 2020-05-14 RX ORDER — SCOLOPAMINE TRANSDERMAL SYSTEM 1 MG/1
1 PATCH, EXTENDED RELEASE TRANSDERMAL CONTINUOUS
Status: DISCONTINUED | OUTPATIENT
Start: 2020-05-14 | End: 2020-05-14 | Stop reason: HOSPADM

## 2020-05-14 RX ORDER — AMOXICILLIN 250 MG
1 CAPSULE ORAL 2 TIMES DAILY
Status: DISCONTINUED | OUTPATIENT
Start: 2020-05-14 | End: 2020-05-15 | Stop reason: HOSPADM

## 2020-05-14 RX ORDER — ALBUTEROL SULFATE 2.5 MG/3ML
2.5 SOLUTION RESPIRATORY (INHALATION)
Status: DISCONTINUED | OUTPATIENT
Start: 2020-05-14 | End: 2020-05-15 | Stop reason: HOSPADM

## 2020-05-14 RX ORDER — SODIUM CHLORIDE 0.9 % (FLUSH) 0.9 %
10 SYRINGE (ML) INJECTION EVERY 12 HOURS SCHEDULED
Status: DISCONTINUED | OUTPATIENT
Start: 2020-05-14 | End: 2020-05-14 | Stop reason: HOSPADM

## 2020-05-14 RX ORDER — NALOXONE HCL 0.4 MG/ML
0.4 VIAL (ML) INJECTION
Status: DISCONTINUED | OUTPATIENT
Start: 2020-05-14 | End: 2020-05-15 | Stop reason: HOSPADM

## 2020-05-14 RX ORDER — OXYCODONE AND ACETAMINOPHEN 10; 325 MG/1; MG/1
1 TABLET ORAL ONCE AS NEEDED
Status: DISCONTINUED | OUTPATIENT
Start: 2020-05-14 | End: 2020-05-14 | Stop reason: HOSPADM

## 2020-05-14 RX ORDER — LIDOCAINE HYDROCHLORIDE 20 MG/ML
5 SOLUTION OROPHARYNGEAL ONCE
Status: COMPLETED | OUTPATIENT
Start: 2020-05-14 | End: 2020-05-14

## 2020-05-14 RX ORDER — ONDANSETRON 2 MG/ML
4 INJECTION INTRAMUSCULAR; INTRAVENOUS AS NEEDED
Status: DISCONTINUED | OUTPATIENT
Start: 2020-05-14 | End: 2020-05-14 | Stop reason: HOSPADM

## 2020-05-14 RX ORDER — SODIUM CHLORIDE, SODIUM LACTATE, POTASSIUM CHLORIDE, CALCIUM CHLORIDE 600; 310; 30; 20 MG/100ML; MG/100ML; MG/100ML; MG/100ML
9 INJECTION, SOLUTION INTRAVENOUS CONTINUOUS
Status: DISCONTINUED | OUTPATIENT
Start: 2020-05-14 | End: 2020-05-14 | Stop reason: HOSPADM

## 2020-05-14 RX ORDER — PROPOFOL 10 MG/ML
VIAL (ML) INTRAVENOUS AS NEEDED
Status: DISCONTINUED | OUTPATIENT
Start: 2020-05-14 | End: 2020-05-14 | Stop reason: SURG

## 2020-05-14 RX ORDER — ACETAMINOPHEN 500 MG
1000 TABLET ORAL ONCE
Status: COMPLETED | OUTPATIENT
Start: 2020-05-14 | End: 2020-05-14

## 2020-05-14 RX ORDER — SODIUM CHLORIDE 9 MG/ML
100 INJECTION, SOLUTION INTRAVENOUS CONTINUOUS
Status: DISCONTINUED | OUTPATIENT
Start: 2020-05-14 | End: 2020-05-15 | Stop reason: HOSPADM

## 2020-05-14 RX ORDER — DEXTROSE MONOHYDRATE 25 G/50ML
12.5 INJECTION, SOLUTION INTRAVENOUS AS NEEDED
Status: DISCONTINUED | OUTPATIENT
Start: 2020-05-14 | End: 2020-05-14 | Stop reason: HOSPADM

## 2020-05-14 RX ORDER — MECLIZINE HYDROCHLORIDE 25 MG/1
12.5 TABLET ORAL 3 TIMES DAILY
Status: DISCONTINUED | OUTPATIENT
Start: 2020-05-14 | End: 2020-05-15 | Stop reason: HOSPADM

## 2020-05-14 RX ORDER — SODIUM CHLORIDE, SODIUM LACTATE, POTASSIUM CHLORIDE, CALCIUM CHLORIDE 600; 310; 30; 20 MG/100ML; MG/100ML; MG/100ML; MG/100ML
1000 INJECTION, SOLUTION INTRAVENOUS CONTINUOUS
Status: DISCONTINUED | OUTPATIENT
Start: 2020-05-14 | End: 2020-05-14 | Stop reason: HOSPADM

## 2020-05-14 RX ORDER — CETIRIZINE HYDROCHLORIDE 10 MG/1
10 TABLET ORAL DAILY
Status: DISCONTINUED | OUTPATIENT
Start: 2020-05-14 | End: 2020-05-15 | Stop reason: HOSPADM

## 2020-05-14 RX ORDER — NALOXONE HCL 0.4 MG/ML
0.04 VIAL (ML) INJECTION AS NEEDED
Status: DISCONTINUED | OUTPATIENT
Start: 2020-05-14 | End: 2020-05-14 | Stop reason: HOSPADM

## 2020-05-14 RX ORDER — ACETAMINOPHEN 325 MG/1
650 TABLET ORAL EVERY 4 HOURS PRN
Status: DISCONTINUED | OUTPATIENT
Start: 2020-05-14 | End: 2020-05-15 | Stop reason: HOSPADM

## 2020-05-14 RX ORDER — LOSARTAN POTASSIUM 50 MG/1
100 TABLET ORAL DAILY
Status: DISCONTINUED | OUTPATIENT
Start: 2020-05-14 | End: 2020-05-15 | Stop reason: HOSPADM

## 2020-05-14 RX ORDER — FLUTICASONE PROPIONATE 50 MCG
2 SPRAY, SUSPENSION (ML) NASAL DAILY
Status: DISCONTINUED | OUTPATIENT
Start: 2020-05-14 | End: 2020-05-15 | Stop reason: HOSPADM

## 2020-05-14 RX ORDER — AMLODIPINE BESYLATE 10 MG/1
10 TABLET ORAL DAILY
Status: DISCONTINUED | OUTPATIENT
Start: 2020-05-15 | End: 2020-05-15 | Stop reason: HOSPADM

## 2020-05-14 RX ORDER — DIPHENHYDRAMINE HCL 12.5MG/5ML
25 LIQUID (ML) ORAL ONCE
Status: COMPLETED | OUTPATIENT
Start: 2020-05-14 | End: 2020-05-14

## 2020-05-14 RX ORDER — MORPHINE SULFATE 2 MG/ML
2 INJECTION, SOLUTION INTRAMUSCULAR; INTRAVENOUS
Status: DISCONTINUED | OUTPATIENT
Start: 2020-05-14 | End: 2020-05-14 | Stop reason: HOSPADM

## 2020-05-14 RX ORDER — SUCCINYLCHOLINE CHLORIDE 20 MG/ML
INJECTION INTRAMUSCULAR; INTRAVENOUS AS NEEDED
Status: DISCONTINUED | OUTPATIENT
Start: 2020-05-14 | End: 2020-05-14 | Stop reason: SURG

## 2020-05-14 RX ORDER — FENTANYL CITRATE 50 UG/ML
INJECTION, SOLUTION INTRAMUSCULAR; INTRAVENOUS AS NEEDED
Status: DISCONTINUED | OUTPATIENT
Start: 2020-05-14 | End: 2020-05-14 | Stop reason: SURG

## 2020-05-14 RX ORDER — DEXAMETHASONE SODIUM PHOSPHATE 4 MG/ML
4 INJECTION, SOLUTION INTRA-ARTICULAR; INTRALESIONAL; INTRAMUSCULAR; INTRAVENOUS; SOFT TISSUE ONCE AS NEEDED
Status: COMPLETED | OUTPATIENT
Start: 2020-05-14 | End: 2020-05-14

## 2020-05-14 RX ORDER — DEXTROSE MONOHYDRATE 25 G/50ML
25 INJECTION, SOLUTION INTRAVENOUS
Status: DISCONTINUED | OUTPATIENT
Start: 2020-05-14 | End: 2020-05-14 | Stop reason: SDUPTHER

## 2020-05-14 RX ORDER — FLUMAZENIL 0.1 MG/ML
0.2 INJECTION INTRAVENOUS AS NEEDED
Status: DISCONTINUED | OUTPATIENT
Start: 2020-05-14 | End: 2020-05-14 | Stop reason: HOSPADM

## 2020-05-14 RX ORDER — TRAMADOL HYDROCHLORIDE 50 MG/1
50 TABLET ORAL EVERY 6 HOURS PRN
Status: DISCONTINUED | OUTPATIENT
Start: 2020-05-14 | End: 2020-05-15 | Stop reason: HOSPADM

## 2020-05-14 RX ORDER — FENTANYL CITRATE 50 UG/ML
25 INJECTION, SOLUTION INTRAMUSCULAR; INTRAVENOUS
Status: DISCONTINUED | OUTPATIENT
Start: 2020-05-14 | End: 2020-05-14 | Stop reason: HOSPADM

## 2020-05-14 RX ORDER — OXYCODONE AND ACETAMINOPHEN 7.5; 325 MG/1; MG/1
2 TABLET ORAL ONCE AS NEEDED
Status: DISCONTINUED | OUTPATIENT
Start: 2020-05-14 | End: 2020-05-14 | Stop reason: HOSPADM

## 2020-05-14 RX ORDER — DEXTROSE MONOHYDRATE 25 G/50ML
25 INJECTION, SOLUTION INTRAVENOUS
Status: DISCONTINUED | OUTPATIENT
Start: 2020-05-14 | End: 2020-05-15 | Stop reason: HOSPADM

## 2020-05-14 RX ORDER — CEFAZOLIN SODIUM IN 0.9 % NACL 3 G/100 ML
3 INTRAVENOUS SOLUTION, PIGGYBACK (ML) INTRAVENOUS ONCE
Status: COMPLETED | OUTPATIENT
Start: 2020-05-14 | End: 2020-05-14

## 2020-05-14 RX ORDER — SODIUM CHLORIDE, SODIUM LACTATE, POTASSIUM CHLORIDE, CALCIUM CHLORIDE 600; 310; 30; 20 MG/100ML; MG/100ML; MG/100ML; MG/100ML
100 INJECTION, SOLUTION INTRAVENOUS CONTINUOUS
Status: DISCONTINUED | OUTPATIENT
Start: 2020-05-14 | End: 2020-05-14 | Stop reason: HOSPADM

## 2020-05-14 RX ORDER — ATORVASTATIN CALCIUM 10 MG/1
10 TABLET, FILM COATED ORAL NIGHTLY
Status: DISCONTINUED | OUTPATIENT
Start: 2020-05-14 | End: 2020-05-15 | Stop reason: HOSPADM

## 2020-05-14 RX ORDER — MAGNESIUM HYDROXIDE 1200 MG/15ML
LIQUID ORAL AS NEEDED
Status: DISCONTINUED | OUTPATIENT
Start: 2020-05-14 | End: 2020-05-14 | Stop reason: HOSPADM

## 2020-05-14 RX ORDER — DULOXETIN HYDROCHLORIDE 30 MG/1
60 CAPSULE, DELAYED RELEASE ORAL 2 TIMES DAILY
Status: DISCONTINUED | OUTPATIENT
Start: 2020-05-14 | End: 2020-05-15 | Stop reason: HOSPADM

## 2020-05-14 RX ORDER — GLIPIZIDE 10 MG/1
10 TABLET ORAL
Status: DISCONTINUED | OUTPATIENT
Start: 2020-05-14 | End: 2020-05-15 | Stop reason: HOSPADM

## 2020-05-14 RX ORDER — OXYCODONE HCL 20 MG/1
20 TABLET, FILM COATED, EXTENDED RELEASE ORAL ONCE
Status: COMPLETED | OUTPATIENT
Start: 2020-05-14 | End: 2020-05-14

## 2020-05-14 RX ORDER — ROCURONIUM BROMIDE 10 MG/ML
INJECTION, SOLUTION INTRAVENOUS AS NEEDED
Status: DISCONTINUED | OUTPATIENT
Start: 2020-05-14 | End: 2020-05-14 | Stop reason: SURG

## 2020-05-14 RX ORDER — OXYCODONE AND ACETAMINOPHEN 10; 325 MG/1; MG/1
1 TABLET ORAL EVERY 4 HOURS PRN
Status: DISCONTINUED | OUTPATIENT
Start: 2020-05-14 | End: 2020-05-15 | Stop reason: HOSPADM

## 2020-05-14 RX ORDER — ONDANSETRON 4 MG/1
4 TABLET, FILM COATED ORAL EVERY 6 HOURS PRN
Status: DISCONTINUED | OUTPATIENT
Start: 2020-05-14 | End: 2020-05-15 | Stop reason: HOSPADM

## 2020-05-14 RX ORDER — PROMETHAZINE HYDROCHLORIDE 25 MG/ML
12.5 INJECTION, SOLUTION INTRAMUSCULAR; INTRAVENOUS EVERY 6 HOURS PRN
Status: DISCONTINUED | OUTPATIENT
Start: 2020-05-14 | End: 2020-05-15 | Stop reason: HOSPADM

## 2020-05-14 RX ORDER — SODIUM CHLORIDE 0.9 % (FLUSH) 0.9 %
3 SYRINGE (ML) INJECTION AS NEEDED
Status: DISCONTINUED | OUTPATIENT
Start: 2020-05-14 | End: 2020-05-14 | Stop reason: HOSPADM

## 2020-05-14 RX ORDER — FAMOTIDINE 10 MG/ML
20 INJECTION, SOLUTION INTRAVENOUS
Status: DISCONTINUED | OUTPATIENT
Start: 2020-05-14 | End: 2020-05-14 | Stop reason: HOSPADM

## 2020-05-14 RX ORDER — NICOTINE POLACRILEX 4 MG
15 LOZENGE BUCCAL
Status: DISCONTINUED | OUTPATIENT
Start: 2020-05-14 | End: 2020-05-15 | Stop reason: HOSPADM

## 2020-05-14 RX ORDER — METOPROLOL TARTRATE 50 MG/1
50 TABLET, FILM COATED ORAL EVERY 12 HOURS SCHEDULED
Status: DISCONTINUED | OUTPATIENT
Start: 2020-05-14 | End: 2020-05-15 | Stop reason: HOSPADM

## 2020-05-14 RX ORDER — SODIUM CHLORIDE 0.9 % (FLUSH) 0.9 %
10 SYRINGE (ML) INJECTION AS NEEDED
Status: DISCONTINUED | OUTPATIENT
Start: 2020-05-14 | End: 2020-05-15 | Stop reason: HOSPADM

## 2020-05-14 RX ORDER — ONDANSETRON 2 MG/ML
4 INJECTION INTRAMUSCULAR; INTRAVENOUS EVERY 6 HOURS PRN
Status: DISCONTINUED | OUTPATIENT
Start: 2020-05-14 | End: 2020-05-15 | Stop reason: HOSPADM

## 2020-05-14 RX ORDER — LIDOCAINE HYDROCHLORIDE 20 MG/ML
INJECTION, SOLUTION INFILTRATION; PERINEURAL AS NEEDED
Status: DISCONTINUED | OUTPATIENT
Start: 2020-05-14 | End: 2020-05-14 | Stop reason: SURG

## 2020-05-14 RX ORDER — HYDROCHLOROTHIAZIDE 25 MG/1
25 TABLET ORAL DAILY
Status: DISCONTINUED | OUTPATIENT
Start: 2020-05-14 | End: 2020-05-15 | Stop reason: HOSPADM

## 2020-05-14 RX ORDER — ONDANSETRON 2 MG/ML
INJECTION INTRAMUSCULAR; INTRAVENOUS AS NEEDED
Status: DISCONTINUED | OUTPATIENT
Start: 2020-05-14 | End: 2020-05-14 | Stop reason: SURG

## 2020-05-14 RX ORDER — NICOTINE POLACRILEX 4 MG
15 LOZENGE BUCCAL
Status: DISCONTINUED | OUTPATIENT
Start: 2020-05-14 | End: 2020-05-14 | Stop reason: SDUPTHER

## 2020-05-14 RX ADMIN — CETIRIZINE HYDROCHLORIDE 10 MG: 10 TABLET, FILM COATED ORAL at 16:47

## 2020-05-14 RX ADMIN — ATORVASTATIN CALCIUM 10 MG: 10 TABLET, FILM COATED ORAL at 22:21

## 2020-05-14 RX ADMIN — HYDROCHLOROTHIAZIDE 25 MG: 25 TABLET ORAL at 16:47

## 2020-05-14 RX ADMIN — SODIUM CHLORIDE 100 ML/HR: 9 INJECTION, SOLUTION INTRAVENOUS at 17:13

## 2020-05-14 RX ADMIN — LIDOCAINE HYDROCHLORIDE 5 ML: 20 SOLUTION ORAL; TOPICAL at 20:44

## 2020-05-14 RX ADMIN — OXYCODONE HYDROCHLORIDE AND ACETAMINOPHEN 1 TABLET: 10; 325 TABLET ORAL at 22:33

## 2020-05-14 RX ADMIN — DIAZEPAM 5 MG: 5 TABLET ORAL at 22:34

## 2020-05-14 RX ADMIN — SODIUM CHLORIDE, POTASSIUM CHLORIDE, SODIUM LACTATE AND CALCIUM CHLORIDE: 600; 310; 30; 20 INJECTION, SOLUTION INTRAVENOUS at 08:58

## 2020-05-14 RX ADMIN — INSULIN DETEMIR 55 UNITS: 100 INJECTION, SOLUTION SUBCUTANEOUS at 22:17

## 2020-05-14 RX ADMIN — ALBUTEROL SULFATE 2.5 MG: 2.5 SOLUTION RESPIRATORY (INHALATION) at 14:59

## 2020-05-14 RX ADMIN — CEFAZOLIN 3 G: 1 INJECTION, POWDER, FOR SOLUTION INTRAMUSCULAR; INTRAVENOUS; PARENTERAL at 09:24

## 2020-05-14 RX ADMIN — PROPOFOL 100 MG: 10 INJECTION, EMULSION INTRAVENOUS at 09:24

## 2020-05-14 RX ADMIN — DIPHENHYDRAMINE HYDROCHLORIDE 25 MG: 12.5 SOLUTION ORAL at 20:45

## 2020-05-14 RX ADMIN — ALBUTEROL SULFATE 2.5 MG: 2.5 SOLUTION RESPIRATORY (INHALATION) at 20:12

## 2020-05-14 RX ADMIN — PROPOFOL 50 MG: 10 INJECTION, EMULSION INTRAVENOUS at 09:10

## 2020-05-14 RX ADMIN — FAMOTIDINE 20 MG: 10 INJECTION, SOLUTION INTRAVENOUS at 08:41

## 2020-05-14 RX ADMIN — FLUTICASONE PROPIONATE 2 SPRAY: 50 SPRAY, METERED NASAL at 16:48

## 2020-05-14 RX ADMIN — DULOXETINE HYDROCHLORIDE 60 MG: 30 CAPSULE, DELAYED RELEASE ORAL at 22:22

## 2020-05-14 RX ADMIN — DEXAMETHASONE SODIUM PHOSPHATE 4 MG: 4 INJECTION, SOLUTION INTRAMUSCULAR; INTRAVENOUS at 08:41

## 2020-05-14 RX ADMIN — ONDANSETRON HYDROCHLORIDE 4 MG: 2 SOLUTION INTRAMUSCULAR; INTRAVENOUS at 11:22

## 2020-05-14 RX ADMIN — SODIUM CHLORIDE, PRESERVATIVE FREE 3 ML: 5 INJECTION INTRAVENOUS at 22:23

## 2020-05-14 RX ADMIN — OXYCODONE HYDROCHLORIDE 20 MG: 20 TABLET, FILM COATED, EXTENDED RELEASE ORAL at 08:10

## 2020-05-14 RX ADMIN — OXYCODONE HYDROCHLORIDE AND ACETAMINOPHEN 1 TABLET: 10; 325 TABLET ORAL at 16:43

## 2020-05-14 RX ADMIN — SCOPALAMINE 1 PATCH: 1 PATCH, EXTENDED RELEASE TRANSDERMAL at 08:41

## 2020-05-14 RX ADMIN — MECLIZINE HYDROCHLORIDE 12.5 MG: 25 TABLET ORAL at 22:22

## 2020-05-14 RX ADMIN — PROPOFOL 50 MG: 10 INJECTION, EMULSION INTRAVENOUS at 09:20

## 2020-05-14 RX ADMIN — ACETAMINOPHEN 1000 MG: 500 TABLET, FILM COATED ORAL at 08:41

## 2020-05-14 RX ADMIN — FENTANYL CITRATE 100 MCG: 50 INJECTION, SOLUTION INTRAMUSCULAR; INTRAVENOUS at 09:05

## 2020-05-14 RX ADMIN — METOPROLOL TARTRATE 50 MG: 50 TABLET, FILM COATED ORAL at 22:21

## 2020-05-14 RX ADMIN — FENTANYL CITRATE 100 MCG: 50 INJECTION, SOLUTION INTRAMUSCULAR; INTRAVENOUS at 09:42

## 2020-05-14 RX ADMIN — SODIUM CHLORIDE, POTASSIUM CHLORIDE, SODIUM LACTATE AND CALCIUM CHLORIDE 1000 ML: 600; 310; 30; 20 INJECTION, SOLUTION INTRAVENOUS at 07:58

## 2020-05-14 RX ADMIN — ROCURONIUM BROMIDE 10 MG: 10 INJECTION INTRAVENOUS at 09:05

## 2020-05-14 RX ADMIN — MECLIZINE HYDROCHLORIDE 12.5 MG: 25 TABLET ORAL at 16:46

## 2020-05-14 RX ADMIN — LIDOCAINE HYDROCHLORIDE 100 MG: 20 INJECTION, SOLUTION INFILTRATION; PERINEURAL at 09:06

## 2020-05-14 RX ADMIN — ALUMINUM HYDROXIDE, MAGNESIUM HYDROXIDE, AND DIMETHICONE 30 ML: 400; 400; 40 SUSPENSION ORAL at 20:45

## 2020-05-14 RX ADMIN — PROPOFOL 100 MCG/KG/MIN: 10 INJECTION, EMULSION INTRAVENOUS at 09:04

## 2020-05-14 RX ADMIN — INSULIN LISPRO 8 UNITS: 100 INJECTION, SOLUTION INTRAVENOUS; SUBCUTANEOUS at 17:13

## 2020-05-14 RX ADMIN — LOSARTAN POTASSIUM 100 MG: 50 TABLET, FILM COATED ORAL at 16:47

## 2020-05-14 RX ADMIN — GLIPIZIDE 10 MG: 10 TABLET ORAL at 16:47

## 2020-05-14 RX ADMIN — DOCUSATE SODIUM 50 MG AND SENNOSIDES 8.6 MG 1 TABLET: 8.6; 5 TABLET, FILM COATED ORAL at 22:21

## 2020-05-14 RX ADMIN — CEFAZOLIN SODIUM 2 G: 2 SOLUTION INTRAVENOUS at 16:59

## 2020-05-14 RX ADMIN — SUCCINYLCHOLINE CHLORIDE 120 MG: 20 INJECTION, SOLUTION INTRAMUSCULAR; INTRAVENOUS at 09:06

## 2020-05-14 RX ADMIN — HYDROMORPHONE HYDROCHLORIDE 1 MG: 1 INJECTION, SOLUTION INTRAMUSCULAR; INTRAVENOUS; SUBCUTANEOUS at 20:39

## 2020-05-14 RX ADMIN — PROPOFOL 50 MG: 10 INJECTION, EMULSION INTRAVENOUS at 09:14

## 2020-05-14 RX ADMIN — PROPOFOL 100 MG: 10 INJECTION, EMULSION INTRAVENOUS at 09:06

## 2020-05-14 RX ADMIN — FENTANYL CITRATE 100 MCG: 50 INJECTION, SOLUTION INTRAMUSCULAR; INTRAVENOUS at 09:15

## 2020-05-14 RX ADMIN — Medication 1 CAPSULE: at 16:47

## 2020-05-14 NOTE — OP NOTE
Anterior cervical discectomy with fusion procedure Note    Barbaar Tapia  5/14/2020    Pre-op Diagnosis:     1.  Increasing chronic neck pain.    2.  Bilateral shoulder and arm radiculopathy.   3.  Bilateral upper extremity weakness.    4.  Degenerative disk disease, C5 to 7.    5.  Disk herniation, left C5-6.    6.  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      Post-op Diagnosis:    same    Procedure/CPT® Codes:    1.  Anterior cervical discectomy with interbody fusion C5-6, C6-7  2.  Anterior spinal instrumentation C5-6, C6-7 ([place] anterior plate and screws x 2)  3.  Use of titanium interbody biomechanical device for fusion C5-6, C6-7 ([place] HiJak titanium spacers x 2)  4.  Use of locally obtained autograft bone for fusion C5 to 7  5.  Use of allograft bone chips for fusion C5 to 7  6.  Use of allograft liquid for fusion C5 to 7 (BioBurst)  7.  Use of operating microscope for decompression and microdissection  8.  Use of fluoroscopy for confirmation of surgical level, placement of instrumentation and interbody spacers  9.  Intraoperative neuromonitoring    Anesthesia: General    Surgeon: MAXWELL Blanchard MD    Assistant: Bennett Fountain    Estimated Blood Loss: 50 mL    Complications: None    Condition: Stable to PACU.    Indications:    The patient is a 60-year-old who sees Dr. Yaakov Ocampo for medical issues.  She presented to the office with complaints of increasing chronic neck pain along with bilateral shoulder and arm radiculopathy.  On exam she was noted to have weakness in both upper extremities.  Imaging studies revealed degenerative disc disease from C5-7 along with central and bilateral foraminal stenosis.  She was also noted to have a disc herniation on the left side of C5-6 contributing to the stenosis.    After failing all conservative measures it was mutually decided that surgery would be the best option.  Risks, benefits, and complications of surgery were discussed with the patient.  The  patient appeared well informed and wished to proceed.  We specifically discussed the risks of infection, blood loss, nerve root injury, CSF leak, spinal cord injury, and the possibility of incomplete resolution of symptoms.  We also discussed the possible risk of a nonunion and the potential need for additional surgery in the event of a pseudoarthrosis or hardware failure.    Operative Procedure:    After obtaining informed consent and verifying the correct operative levels, the patient was brought to the operating room and placed supine on an operating table.  A general anesthetic was provided by the anesthesia service with the assistance of an endotracheal tube.  Once this was properly positioned and secured, a bump was placed under the patient's shoulders placing the neck into a gentle extended position.  Head halter traction was then used with 10 pounds weight to maintain head position.  The shoulders were taped using 3 inch wide cloth tape to assist with radiographic visualization.  Fluoroscopy was used to identify the disc spaces from C5 to 7, and the skin was marked for our planned incision.  The anterior neck region was then prepped and draped in usual sterile fashion.  A surgical timeout was taken to confirm this was the correct patient, we are working at the correct levels, and that preoperative antibiotics were given in a timely fashion.     A transverse incision was then created over the anterior cervical region using a 10 blade scalpel directly centered over the levels of interest.  Dissection was carried sharply through subcutaneous tissues.  The platysma was divided in line with the incision and blunt dissection was carried along the medial border of the sternocleidomastoid muscle, lateral to the strap musculature the neck.  The carotid pulse was then palpated, and dissection was carried medial to the carotid sheath and lateral to the trachea and esophagus.  Handheld Cloward retractors along with  Kitners were used to dissect through pretracheal and prevertebral fascia.  A radiographic marker was placed into one of the disc spaces and fluoroscopy was used to confirm that we are indeed at the correct levels.  The longus coli muscles were then elevated from either side of the spine using Bovie cautery.     An initial discectomy was started by creating an annulotomy with Bovie cautery.  A Richardson elevator was used to remove some of the disc material off of the endplates.  Disc material was initially removed using forward angled curettes and pituitaries.  Some anterior osteophytes were removed using a rongeur.  All retrieved bone was cleaned, morcellized and saved for later packing into the disc spaces to assist with obtaining a fusion.  Gratz pins were then placed to allow gentle distraction across the disc spaces.  The microscope was then positioned for the microdissection and decompression portion of the procedure.     The disc spaces of C5-6 and C6-7 were prepared in an identical fashion.  I used Kerrisons to remove remaining anterior osteophytes off of the endplates.  Straight curettes were used to remove the cartilaginous endplates and all remaining disc material.  The high-speed bur was then used to remove posterior osteophytes and to contour the endplates in preparation for fusion.  A forward angled curette was used to free up the posterior margins of the vertebral bodies, releasing the posterior longitudinal ligament.  Posterior osteophytes, posterior disc material, and the PLL were all resected using Kerrisons.  Kerrisons were also used to perform a bilateral neural foraminotomy.  At the end of the discectomy decompression, the central spinal canal and the exiting nerve roots at each level appeared to be free of compression.  Bleeding in the epidural space was controlled using thrombin and gelfoam.  The wound was then copiously irrigated with saline solution.     Next, [place] titanium spacers were malleted  into each of the disc spaces.  I used 8 mm spacers at both C5-6 and C6-7.  The spacers were expanded using the appropriate screwdriver.  The spacers were then packed as tightly as possible with allograft bone matrix using a funnel and impaction device through the .  The spacers were placed as interbody biomechanical devices to assist with fusion.     After confirming the interbody spacers were properly positioned with fluoroscopy, the [place] pins were removed.  Remaining anterior osteophytes were contoured off of the vertebral bodies using a combination of the high-speed bur and the Ron[place]ur to allow for the best possible plate fixation.  An anterior plate from the [place] instrumentation set was then chosen to span each of the levels.  Each plate was held into position using a series of screws.    I placed 2 screws into C5 and one screw into C6 to span C5-6.  I then placed a second plate over C6-7 with 2 screws into C6 and one screw into C7.  I then packed more locally obtained autograft bone and allograft bone chips into the disc spaces from C5-C7 on the sides of the interbody devices to assist with obtaining a fusion.     Final fluoroscopy imaging confirmed adequate position of the plate and screws as well as the titanium spacers.  All implants appeared to be properly positioned with good maintenance of cervical alignment with improved lordosis.  A final inspection of the operative field was then undertaken to ensure that we had adequate hemostasis.  Bleeding at this point was controlled with thrombin, gelfoam and bipolar cautery.    Closure was accomplished by reapproximating the platysma with a 3-0 Vicryl.  Immediate subcutaneous tissues were reapproximated with a 4-0 Vicryl in a buried fashion.  Final skin closure was accomplished with Mastisol and Steri-Strips.  The wound was then washed and a sterile Bioclusive dressing was applied.  The patient was then placed into a hard cervical collar, extubated,  and sent to the recovery room in good stable condition.     The patient tolerated the procedure well.  There were no complications.  We estimated blood loss to be 50 mL.  Intraoperative neural monitoring was used during the procedure.  We used motor evoked potentials, free run EMG, and SSEPs.  There were no neuro monitoring signal changes during the procedure.     Bennett Fountain PA-C provided critical assistance during the procedure.  His assistance was medically necessary in order to allow the procedure to occur in the most safe and efficient manner.  He assisted with not only agent positioning and wound closure, but more importantly with retraction of delicate neurovascular structures, assistance during the discectomy decompression, as well as the placement of the titanium spacers and instrumentation to obtain a fusion.    MAXWELL Blanchard MD     Date: 5/14/2020  Time: 14:01

## 2020-05-14 NOTE — PLAN OF CARE
Problem: Patient Care Overview  Goal: Plan of Care Review  Outcome: Ongoing (interventions implemented as appropriate)  Flowsheets  Taken 5/14/2020 1605 by Inés Dorantes, OTR/L  Progress: no change  Taken 5/14/2020 1350 by Shonna Ye, PT  Plan of Care Reviewed With: patient  Outcome Summary: PT eval completed.  Pt motivated and eager to improve.  Pt w/ hx of fused L ankle which limits normal gait mechanics noting decrease balance w/ gait.  Pt now s/p cervical spine surgery w/ increase fall risk.  Pt will benefit from continued PT services to improve knowledge of spinal precautions, use of brace, safe tech w/ tfers and for proper use of rwx to improve stability and reduce fall risk.  Recommend continued skilled care through HH upon discharge to ensure safe return to the home at discharge.

## 2020-05-14 NOTE — THERAPY EVALUATION
Patient Name: Barbara Tapia  : 1960    MRN: [id number]                              Today's Date: 2020       Admit Date: 2020    Visit Dx:     ICD-10-CM ICD-9-CM   1. Decreased activities of daily living (ADL) Z78.9 V49.89   2. Difficulty walking R26.2 719.7     Patient Active Problem List   Diagnosis   • Palpitation   • Morbid obesity due to excess calories (CMS/HCC)   • BLADIMIR treated with BiPAP   • Chest pain   • Essential hypertension   • Type 2 diabetes mellitus without complication, without long-term current use of insulin (CMS/HCC)   • Mixed hyperlipidemia   • Anxiety and depression   • Other dysphagia   • Gastroesophageal reflux disease without esophagitis   • Encounter for screening for malignant neoplasm of colon   • Esophageal dysphagia   • Nonsmoker   • Obesity, unspecified obesity severity, unspecified obesity type   • Slow transit constipation   • Sleep apnea   • Obesity, Class III, BMI 40-49.9 (morbid obesity) (CMS/HCC)   • Autonomic orthostatic hypotension   • Cervicalgia   • Cervical radiculopathy due to degenerative joint disease of spine     Past Medical History:   Diagnosis Date   • Anxiety    • Anxiety    • Arthritis    • Asthma    • Back pain    • Cervicalgia 2020   • Chest pain    • Chronic fatigue    • Chronic kidney disease     Kidneys are only functioning 50 %   • Depression    • Diabetes (CMS/HCC)    • Diabetes mellitus (CMS/HCC)    • Dizzy    • Fibromyalgia    • Fibromyalgia    • Hiatal hernia    • Hypertension    • Infection of skin and subcutaneous tissue due to fungus    • Knee contracture     total   • Palpitations    • Panic attacks    • PONV (postoperative nausea and vomiting)    • Sleep apnea    • Sleep apnea     BiPAP   • Vertigo      Past Surgical History:   Procedure Laterality Date   • ANKLE FUSION Left    • ANKLE SURGERY Left     multiple surgeries   • CARDIAC CATHETERIZATION Left 2017    Procedure: Cardiac Catheterization/Vascular Study;  Surgeon:  Shane Singh MD;  Location: [facility] CATH INVASIVE LOCATION;  Service:    • CARDIAC CATHETERIZATION N/A 2017    Procedure: Coronary angiography;  Surgeon: Shane Singh MD;  Location: [facility] CATH INVASIVE LOCATION;  Service:    • CARDIAC CATHETERIZATION N/A 2017    Procedure: Left Heart Cath;  Surgeon: Shane Singh MD;  Location: [facility] CATH INVASIVE LOCATION;  Service:    • CERVICAL FUSION      STITCHED UP TO PREVENT LOSING BABY   • CERVIX LESION DESTRUCTION     •  SECTION     • COLONOSCOPY     • COLONOSCOPY N/A 2017    Procedure: COLONOSCOPY WITH ANESTHESIA;  Surgeon: Raffaele Buchanan MD;  Location: [facility] ENDOSCOPY;  Service:    • ELBOW PROCEDURE Left     screws placed x 2   • ENDOSCOPY N/A 2017    Procedure: ESOPHAGOGASTRODUODENOSCOPY WITH ANESTHESIA;  Surgeon: Raffaele Buchanan MD;  Location: [facility] ENDOSCOPY;  Service:    • EXCISION MASS TRUNK      LEFT SIDE CAT SCRATCH FEVER    • EYE SURGERY      cataract removal sam   • REPLACEMENT TOTAL KNEE BILATERAL Bilateral    • ROTATOR CUFF REPAIR     • SINUS SURGERY     • SUBTOTAL HYSTERECTOMY      ovaries still intact   • TENNIS ELBOW RELEASE Right      General Information     Row Name 20 9510          PT Evaluation Time/Intention    Document Type  evaluation s/p anterior cervical discectomy and fusion C5-7  -     Mode of Treatment  physical therapy  -     Row Name 20 4200          General Information    Patient Profile Reviewed?  yes  -JE     Prior Level of Function  independent:;all household mobility;feeding reports she used a quad cane  -     Existing Precautions/Restrictions  fall;spinal;other (see comments) brace worn at all times  -     Barriers to Rehab  previous functional deficit  -     Row Name 20 6907          Relationship/Environment    Lives With  spouse reports spouse works away from the home during the day  -JE     Name(s) of Who Lives With Patient  lives w/ spouse  -JE     Row Name 20 9589           Resource/Environmental Concerns    Current Living Arrangements  home/apartment/condo  -     Row Name 05/14/20 [facility]          Home Main Entrance    Number of Stairs, Main Entrance  other (see comments) ramp to enter home  -     Row Name 05/14/20 1350          Cognitive Assessment/Intervention- PT/OT    Orientation Status (Cognition)  oriented x 4  -     Personal Safety Interventions  fall prevention program maintained;gait belt;muscle strengthening facilitated;nonskid shoes/slippers when out of bed;supervised activity  -     Row Name 05/14/20 1350          Safety Issues, Functional Mobility    Safety Issues Affecting Function (Mobility)  friction/shear risk;safety precaution awareness  -     Impairments Affecting Function (Mobility)  balance;endurance/activity tolerance;pain;sensation/sensory awareness  -       User Key  (r) = Recorded By, (t) = Taken By, (c) = Cosigned By    Initials Name Provider Type    Shonna Duran, PT Physical Therapist        Mobility     Row Name 05/14/20 1350          Bed Mobility Assessment/Treatment    Bed Mobility Assessment/Treatment  rolling right;sidelying-sit;sit-sidelying  -     Rolling Right [place] (Bed Mobility)  contact guard;minimum assist (75% patient effort);verbal cues  -     Sidelying-Sit [place] (Bed Mobility)  contact guard;minimum assist (75% patient effort);verbal cues  -     Sit-Sidelying [place] (Bed Mobility)  contact guard;verbal cues  -     Assistive Device (Bed Mobility)  head of bed elevated  -[facility] Name 05/14/20 1350          Sit-Stand Transfer    Sit-Stand [place] (Transfers)  contact guard;verbal cues;1 person assist;2 person assist  -[facility] Name 05/14/20 1350          Gait/Stairs Assessment/Training    Gait/Stairs Assessment/Training  gait/ambulation independence;distance ambulated;gait pattern;gait deviations  -     [place] Level (Gait)  contact guard;1 person assist;2 person assist  -      Assistive Device (Gait)  other (see comments) hand held assist  -     Distance in Feet (Gait)  80 ft  -     Pattern (Gait)  2-point  -     Deviations/Abnormal Patterns (Gait)  base of support, wide;gait speed decreased;stride length decreased  -     Bilateral Gait Deviations  heel strike decreased;weight shift ability decreased  -     Comment (Gait/Stairs)  L ankle is fused; slowed pace  -       User Key  (r) = Recorded By, (t) = Taken By, (c) = Cosigned By    Initials Name Provider Type    Shonna Duran, PT Physical Therapist        Obj/Interventions     Row Name 05/14/20 [facility]          General ROM    GENERAL ROM COMMENTS  AROM all 4 extremities WFLs except B shlds ~ 50% or slightly less  -JE     Row Name 05/14/20 [facility]          MMT (Manual Muscle Testing)    General MMT Comments  no formal assessment, however pt moves all 4 extremities against gravity   -JE     Row Name 05/14/20 [facility]          Static Sitting Balance    Level of [place] (Unsupported Sitting, Static Balance)  supervision  -     Sitting Position (Unsupported Sitting, Static Balance)  sitting on edge of bed  -JE     Row Name 05/14/20 [facility]          Static Standing Balance    Level of [place] (Supported Standing, Static Balance)  contact guard assist  -JE     Row Name 05/14/20 [facility]          Dynamic Standing Balance    Level of [place], Reaches Outside Midline (Standing, Dynamic Balance)  contact guard assist;1 person assist;2 person assist  -JE     Row Name 05/14/20 [facility]          Sensory Assessment/Intervention    Sensory General Assessment  -- reports hx of numbness, tingling, and burning that comes in goes in hands/feet; reports some better in UEs since surgery  -       User Key  (r) = Recorded By, (t) = Taken By, (c) = Cosigned By    Initials Name Provider Type    Shonna Duran, PT Physical Therapist        Goals/Plan     Row Name 05/14/20 [facility]          Bed Mobility Goal 1 (PT)    Activity/Assistive Device (Bed  Mobility Goal 1, PT)  rolling to left;rolling to right;scooting;sidelying to sit/sit to sidelying  -JE     [place] Level/Cues Needed (Bed Mobility Goal 1, PT)  conditional independence  -JE     Time Frame (Bed Mobility Goal 1, PT)  long term goal (LTG);10 days  -JE     Progress/Outcomes (Bed Mobility Goal 1, PT)  goal ongoing  -     Row Name 05/14/20 9830          Transfer Goal 1 (PT)    Activity/Assistive Device (Transfer Goal 1, PT)  sit-to-stand/stand-to-sit;bed-to-chair/chair-to-bed;walker, rolling  -JE     [place] Level/Cues Needed (Transfer Goal 1, PT)  conditional independence  -JE     Time Frame (Transfer Goal 1, PT)  long term goal (LTG);10 days  -JE     Progress/Outcome (Transfer Goal 1, PT)  goal ongoing  -     Row Name 05/14/20 1910          Gait Training Goal 1 (PT)    Activity/Assistive Device (Gait Training Goal 1, PT)  gait (walking locomotion);assistive device use;decrease fall risk;improve balance and speed;increase endurance/gait distance;walker, rolling  -JE     [place] Level (Gait Training Goal 1, PT)  standby assist  -JE     Distance (Gait Goal 1, PT)  150 ft  -JE     Time Frame (Gait Training Goal 1, PT)  long term goal (LTG);10 days  -JE     Progress/Outcome (Gait Training Goal 1, PT)  goal ongoing  -     Row Name 05/14/20 0357          Patient Education Goal (PT)    Activity (Patient Education Goal, PT)  spinal precautions; management of brace  -JE     [place]/Cues/Accuracy (Memory Goal 2, PT)  demonstrates adequately;independent;verbalizes understanding  -JE     Time Frame (Patient Education Goal, PT)  long term goal (LTG);10 days  -JE     Progress/Outcome (Patient Education Goal, PT)  goal ongoing  -       User Key  (r) = Recorded By, (t) = Taken By, (c) = Cosigned By    Initials Name Provider Type    Shonna Duran, PT Physical Therapist        Clinical Impression     Row Name 05/14/20 7526          Pain Assessment    Additional Documentation  Pain Scale:  "Numbers Pre/Post-Treatment (Group)  -     Row Name 05/14/20 1350          Pain Scale: Numbers Pre/Post-Treatment    Pain Scale: Numbers, Pretreatment  8/10  -     Pain Scale: Numbers, Post-Treatment  8/10  -JE     Pain Location  other (see comments) \"all over\"  -     Pain Intervention(s)  Medication (See MAR);Repositioned;Ambulation/increased activity;Rest  -     Row Name 05/14/20 1350          Plan of Care Review    Plan of Care Reviewed With  patient  -     Progress  improving  -     Outcome Summary  PT eval completed.  Pt motivated and eager to improve.  Pt w/ hx of fused L ankle which limits normal gait mechanics noting decrease balance w/ gait.  Pt now s/p cervical spine surgery w/ increase fall risk.  Pt will benefit from continued PT services to improve knowledge of spinal precautions, use of brace, safe tech w/ tfers and for proper use of rwx to improve stability and reduce fall risk.  Recommend continued skilled care through  upon discharge to ensure safe return to the home at discharge.  -     Row Name 05/14/20 1350          Physical Therapy Clinical Impression    Patient/Family Goals Statement (PT Clinical Impression)  improve mobility  -     Criteria for Skilled Interventions Met (PT Clinical Impression)  yes;treatment indicated  -     Rehab Potential (PT Clinical Summary)  good, to achieve stated therapy goals  -     Predicted Duration of Therapy (PT)  until discharge or goals achieved  -     Row Name 05/14/20 1350          Vital Signs    O2 Delivery Pre Treatment  supplemental O2  -JE     O2 Delivery Intra Treatment  room air  -     O2 Delivery Post Treatment  supplemental O2  -JE     Pre Patient Position  Supine  -JE     Intra Patient Position  Standing  -JE     Post Patient Position  Supine  -     Row Name 05/14/20 1350          Positioning and Restraints    Pre-Treatment Position  in bed  -JE     Post Treatment Position  bed  -JE     In Bed  notified italo;bri;call " light within reach;encouraged to call for assist;side rails up x2;with brace;SCD pump applied  -       User Key  (r) = Recorded By, (t) = Taken By, (c) = Cosigned By    Initials Name Provider Type    Shonna Duran, PT Physical Therapist        Outcome Measures     Row Name 05/14/20 4354          How much help from another person do you currently need...    Turning from your back to your side while in flat bed without using bedrails?  3  -JE     Moving from lying on back to sitting on the side of a flat bed without bedrails?  3  -JE     Moving to and from a bed to a chair (including a wheelchair)?  3  -JE     Standing up from a chair using your arms (e.g., wheelchair, bedside chair)?  3  -JE     Climbing 3-5 steps with a railing?  3  -JE     To walk in hospital room?  3  -JE     AM-PAC 6 Clicks Score (PT)  18  -     Row Name 05/14/20 5131          Functional Assessment    Outcome Measure Options  AM-PAC 6 Clicks Basic Mobility (PT)  -       User Key  (r) = Recorded By, (t) = Taken By, (c) = Cosigned By    Initials Name Provider Type    Shonna Duran, PT Physical Therapist        Physical Therapy Education                 Title: PT OT SLP Therapies (In Progress)     Topic: Physical Therapy (Done)     Point: Mobility training (Done)     Description:   Instruct learner(s) on safety and technique for assisting patient out of bed, chair or wheelchair.  Instruct in the proper use of assistive devices, such as walker, crutches, cane or brace.              Patient Friendly Description:   It's important to get you on your feet again, but we need to do so in a way that is safe for you. Falling has serious consequences, and your personal safety is the most important thing of all.        When it's time to get out of bed, one of us or a family member will sit next to you on the bed to give you support.     If your doctor or nurse tells you to use a walker, crutches, a cane, or a brace, be sure you use it every  time you get out of bed, even if you think you don't need it.    Learning Progress Summary           Patient Acceptance, E,TB,D, VU,DU,NR by  at 5/14/2020 1627    Comment:  Education re: purpose of PT/importance of act; educ for safety/falls prevention & improved tech for mob w/ emphasis on spinal precautions; education for improved stability in standing w/ use of rwx to reduce fall risk & to call for assist w/ mobility                   Point: Precautions (Done)     Description:   Instruct learner(s) on prescribed precautions during mobility and gait tasks              Learning Progress Summary           Patient Acceptance, E,TB,D, VU,DU,NR by YADY at 5/14/2020 1627    Comment:  Education re: purpose of PT/importance of act; educ for safety/falls prevention & improved tech for mob w/ emphasis on spinal precautions; education for improved stability in standing w/ use of rwx to reduce fall risk & to call for assist w/ mobility                               User Key     Initials Effective Dates Name Provider Type Discipline     08/02/18 -  Shonna Ye, PT Physical Therapist PT              PT Recommendation and Plan  Planned Therapy Interventions (PT Eval): balance training, bed mobility training, gait training, orthotic fitting/training, patient/family education, transfer training, other (see comments)(safety/falls prevention; spinal precautions)  Outcome Summary/Treatment Plan (PT)  Anticipated Equipment Needs at Discharge (PT): (if pt does not already have rwx, this might improve her stability; she did report multiple canes at home and a handicap accessible shower w/ seat)  Anticipated Discharge Disposition (PT): home with assist, home with home health  Plan of Care Reviewed With: patient  Progress: improving  Outcome Summary: PT eval completed.  Pt motivated and eager to improve.  Pt w/ hx of fused L ankle which limits normal gait mechanics noting decrease balance w/ gait.  Pt now s/p cervical spine surgery  w/ increase fall risk.  Pt will benefit from continued PT services to improve knowledge of spinal precautions, use of brace, safe tech w/ tfers and for proper use of rwx to improve stability and reduce fall risk.  Recommend continued skilled care through  upon discharge to ensure safe return to the home at discharge.     Time Calculation:   PT Charges     Row Name 05/14/20 1625             Time Calculation    Start Time  1350  -      Stop Time  1445  -      Time Calculation (min)  55 min  -      PT Received On  05/14/20  -      PT Goal Re-Cert Due Date  05/24/20  -        User Key  (r) = Recorded By, (t) = Taken By, (c) = Cosigned By    Initials Name Provider Type    Shonna Duran, PT Physical Therapist        Therapy Charges for Today     Code Description Service Date Service Provider Modifiers Qty    03207969864 HC PT EVAL MOD COMPLEXITY 4 5/14/2020 Shonna Ye, PT GP 1          PT G-Codes  Outcome Measure Options: AM-PAC 6 Clicks Basic Mobility (PT)  AM-PAC 6 Clicks Score (PT): 18  AM-PAC 6 Clicks Score (OT): 15    Shonna Ye PT  5/14/2020

## 2020-05-14 NOTE — PLAN OF CARE
Problem: Patient Care Overview  Goal: Plan of Care Review  Flowsheets  Taken 5/14/2020 1605  Progress: no change  Taken 5/14/2020 1346  Plan of Care Reviewed With: patient  Note:   OT eval completed.  Pt appears fatigued & shows minor confusion postop.  She requires CGA for mobility & Max A for LBD. OTR edu'd pt in spinal precautions & usage of c collar; however, pt will require follow up training.  Anticipate DC home vs. Swing bed placement.

## 2020-05-14 NOTE — ANESTHESIA PROCEDURE NOTES
Airway  Urgency: elective    Date/Time: 5/14/2020 9:07 AM  Airway not difficult    General Information and Staff    Patient location during procedure: OR  CRNA: Dipesh Hurtado CRNA    Indications and Patient Condition  Indications for airway management: airway protection    Preoxygenated: yes  Mask difficulty assessment: 0 - not attempted    Final Airway Details  Final airway type: endotracheal airway      Successful airway: ETT  Cuffed: yes   Successful intubation technique: direct laryngoscopy  Facilitating devices/methods: intubating stylet  Endotracheal tube insertion site: oral  Blade: Sams  Blade size: 2  ETT size (mm): 7.0  Cormack-Lehane Classification: grade IIa - partial view of glottis  Placement verified by: capnometry   Cuff volume (mL): 6  Measured from: lips  ETT/EBT  to lips (cm): 7  Number of attempts at approach: 1  Assessment: lips, teeth, and gum same as pre-op and atraumatic intubation

## 2020-05-14 NOTE — THERAPY EVALUATION
Acute Care - Occupational Therapy Evaluation  [facility]     Patient Name: Barbara Tapia  : 1960  MRN: [id number]  Today's Date: 2020  Onset of Illness/Injury or Date of Surgery: 20  Date of Referral to OT: 20  Referring Physician: Dr. Blanchard    Admit Date: 2020       ICD-10-CM ICD-9-CM   1. Decreased activities of daily living (ADL) Z78.9 V49.89     Patient Active Problem List   Diagnosis   • Palpitation   • Morbid obesity due to excess calories (CMS/HCC)   • BLADIMIR treated with BiPAP   • Chest pain   • Essential hypertension   • Type 2 diabetes mellitus without complication, without long-term current use of insulin (CMS/HCC)   • Mixed hyperlipidemia   • Anxiety and depression   • Other dysphagia   • Gastroesophageal reflux disease without esophagitis   • Encounter for screening for malignant neoplasm of colon   • Esophageal dysphagia   • Nonsmoker   • Obesity, unspecified obesity severity, unspecified obesity type   • Slow transit constipation   • Sleep apnea   • Obesity, Class III, BMI 40-49.9 (morbid obesity) (CMS/HCC)   • Autonomic orthostatic hypotension   • Cervicalgia   • Cervical radiculopathy due to degenerative joint disease of spine     Past Medical History:   Diagnosis Date   • Anxiety    • Anxiety    • Arthritis    • Asthma    • Back pain    • Cervicalgia 2020   • Chest pain    • Chronic fatigue    • Chronic kidney disease     Kidneys are only functioning 50 %   • Depression    • Diabetes (CMS/HCC)    • Diabetes mellitus (CMS/HCC)    • Dizzy    • Fibromyalgia    • Fibromyalgia    • Hiatal hernia    • Hypertension    • Infection of skin and subcutaneous tissue due to fungus    • Knee contracture     total   • Palpitations    • Panic attacks    • PONV (postoperative nausea and vomiting)    • Sleep apnea    • Sleep apnea     BiPAP   • Vertigo      Past Surgical History:   Procedure Laterality Date   • ANKLE FUSION Left    • ANKLE SURGERY Left     multiple surgeries   •  CARDIAC CATHETERIZATION Left 2017    Procedure: Cardiac Catheterization/Vascular Study;  Surgeon: Shane Singh MD;  Location:  [facility] CATH [facility];  Service:    • CARDIAC CATHETERIZATION N/A 2017    Procedure: Coronary angiography;  Surgeon: Shane Singh MD;  Location:  [facility] CATH [facility];  Service:    • CARDIAC CATHETERIZATION N/A 2017    Procedure: Left Heart Cath;  Surgeon: Shane Singh MD;  Location:  [facility] CATH [facility];  Service:    • CERVICAL FUSION      STITCHED UP TO PREVENT LOSING BABY   • CERVIX LESION DESTRUCTION     •  SECTION     • COLONOSCOPY     • COLONOSCOPY N/A 2017    Procedure: COLONOSCOPY WITH ANESTHESIA;  Surgeon: Raffaele Buchanan MD;  Location:  [facility] ENDOSCOPY;  Service:    • ELBOW PROCEDURE Left     screws placed x 2   • ENDOSCOPY N/A 2017    Procedure: ESOPHAGOGASTRODUODENOSCOPY WITH ANESTHESIA;  Surgeon: Raffaele Buchanan MD;  Location: [facility] ENDOSCOPY;  Service:    • EXCISION MASS TRUNK      LEFT SIDE CAT SCRATCH FEVER    • EYE SURGERY      cataract removal sam   • REPLACEMENT TOTAL KNEE BILATERAL Bilateral    • ROTATOR CUFF REPAIR     • SINUS SURGERY     • SUBTOTAL HYSTERECTOMY      ovaries still intact   • TENNIS ELBOW RELEASE Right           OT ASSESSMENT FLOWSHEET (last 12 hours)      Occupational Therapy Evaluation     Row Name 20 1346                   OT Evaluation Time/Intention    Subjective Information  complains of;weakness;fatigue  -        Document Type  evaluation  -        Mode of Treatment  occupational therapy  -           General Information    Patient Profile Reviewed?  yes  -        Onset of Illness/Injury or Date of Surgery  20  -        Referring Physician  Dr. Blanchard  -        Patient Observations  alert;cooperative;agree to therapy  -        General Observations of Patient  fowlers, c collar, IVs  -        Equipment Currently Used at Home  cane, quad;walker, rolling  -      "   Existing Precautions/Restrictions  fall;spinal;brace worn when out of bed  -        Limitations/Impairments  safety/cognitive  -        Risks Reviewed  patient:;LOB;increased discomfort  -        Benefits Reviewed  patient:;improve function;increase independence;increase strength;increase balance  -           Relationship/Environment    Lives With  spouse  -           Resource/Environmental Concerns    Current Living Arrangements  home/apartment/condo  -           Cognitive Assessment/Interventions    Additional Documentation  Cognitive Assessment/Intervention (Group)  -           Cognitive Assessment/Intervention- PT/OT    Affect/Mental Status (Cognitive)  WNL;confused pt. reports feeling \"fuzzy\" post op  -        Orientation Status (Cognition)  oriented x 4  -CH        Follows Commands (Cognition)  WNL  -CH        Cognitive Function (Cognitive)  WNL  -        Personal Safety Interventions  fall prevention program maintained;gait belt;muscle strengthening facilitated;nonskid shoes/slippers when out of bed;supervised activity  -           Safety Issues, Functional Mobility    Impairments Affecting Function (Mobility)  balance;endurance/activity tolerance;strength;range of motion (ROM)  -           Bed Mobility Assessment/Treatment    Bed Mobility Assessment/Treatment  rolling right;sidelying-sit;sit-sidelying  -        Rolling Right [place] (Bed Mobility)  contact guard;verbal cues  -        Sidelying-Sit [place] (Bed Mobility)  contact guard;verbal cues  -        Sit-Sidelying [place] (Bed Mobility)  contact guard;verbal cues  -        Bed Mobility, Safety Issues  decreased use of arms for pushing/pulling;decreased use of legs for bridging/pushing;impaired trunk control for bed mobility  -           Functional Mobility    Functional Mobility- Ind. Level  contact guard assist;minimum assist (75% patient effort);1 person  -           Transfer Assessment/Treatment    " Transfer Assessment/Treatment  sit-stand transfer;stand-sit transfer  -           Sit-Stand Transfer    Sit-Stand [place] (Transfers)  contact guard;2 person assist;verbal cues  -           Stand-Sit Transfer    Stand-Sit [place] (Transfers)  contact guard;2 person assist;verbal cues  -           ADL Assessment/Intervention    BADL Assessment/Intervention  lower body dressing;upper body dressing  -           Upper Body Dressing Assessment/Training    Upper Body Dressing [place] Level  maximum assist (25% patient effort);don;doff c collar  -        Upper Body Dressing Position  edge of bed sitting  -           Lower Body Dressing Assessment/Training    Lower Body Dressing [place] Level  maximum assist (25% patient effort);don;socks  -        Lower Body Dressing Position  edge of bed sitting  -           BADL Safety/Performance    Impairments, BADL Safety/Performance  balance;endurance/activity tolerance;pain;range of motion  -           General ROM    GENERAL ROM COMMENTS  bilat shoulders acheive approx 80 degrees flexion, all other UE jts achieve approx full AROM  -           MMT (Manual Muscle Testing)    General MMT Comments  no formal rating taken 2' lifting restriction  -           Motor Assessment/Interventions    Additional Documentation  Balance (Group);Fine Motor Testing & Training (Group)  -           Balance    Balance  static sitting balance;static standing balance  -           Static Sitting Balance    Level of [place] (Unsupported Sitting, Static Balance)  conditional independence  -        Sitting Position (Unsupported Sitting, Static Balance)  sitting on edge of bed  -           Static Standing Balance    Level of [place] (Supported Standing, Static Balance)  contact guard assist;1 person assist  -           Fine Motor Testing & Training    Comment, Fine Motor Coordination  pt. able to oppose at all digits bilaterally  -           Sensory  Assessment/Intervention    Sensory General Assessment  other (see comments) pt. reports decline in tingling at bilat hands  -           Positioning and Restraints    Pre-Treatment Position  in bed  -CH        Post Treatment Position  bed  -CH        In Bed  fowlers;call light within reach;encouraged to call for assist;side rails up x2;notified nsg  -           Pain Assessment    Additional Documentation  Pain Scale: FACES Pre/Post-Treatment (Group)  -           Pain Scale: FACES Pre/Post-Treatment    Pain: FACES Scale, Pretreatment  6-->hurts even more  -CH        Pain: FACES Scale, Post-Treatment  6-->hurts even more  -CH           Orthotics & Prosthetics Management    Orthosis Location  spinal orthosis  -        Additional Documentation  Orthosis Location (Row)  -           Spinal Orthosis Management    Type (Spinal Orthosis)  cervical collar, hard  -CH        Functional Design (Spinal Orthosis)  static orthosis  -        Therapeutic Indications (Spinal Orthosis)  post-op positioning/protection  -        Wearing Schedule (Spinal Orthosis)  wear full time  -        Orthosis Training (Spinal Orthosis)  patient;all orthosis skills;partially meets, needs review/practice  -           Wound 05/14/20 0945 midline throat Incision    Wound - Properties Group Date first assessed: 05/14/20  - Time first assessed: 0945 -SANDRA Orientation: midline  -SANDRA Location: throat  -SANDRA Primary Wound Type: Incision  -SANDRA       Plan of Care Review    Plan of Care Reviewed With  patient  -           Clinical Impression (OT)    Date of Referral to OT  05/14/20  -        OT Diagnosis  post op care  -        Criteria for Skilled Therapeutic Interventions Met (OT Eval)  yes;treatment indicated  -        Rehab Potential (OT Eval)  good, to achieve stated therapy goals  -        Therapy Frequency (OT Eval)  daily  -        Care Plan Review (OT)  evaluation/treatment results reviewed;care plan/treatment goals  reviewed;risks/benefits reviewed;current/potential barriers reviewed;patient/other agree to care plan  -CH        Anticipated Discharge Disposition (OT)  home with assist  -CH           Planned OT Interventions    Planned Therapy Interventions (OT Eval)  activity tolerance training;BADL retraining;functional balance retraining;occupation/activity based interventions;orthotic fabrication/fitting/training;patient/caregiver education/training;transfer/mobility retraining  -CH           OT Goals    Transfer Goal Selection (OT)  transfer, OT goal 1  -CH        Toileting Goal Selection (OT)  toileting, OT goal 1  -CH           Transfer Goal 1 (OT)    Activity/Assistive Device (Transfer Goal 1, OT)  sit-to-stand/stand-to-sit;bed-to-chair/chair-to-bed;toilet;commode  -CH        [place] Level/Cues Needed (Transfer Goal 1, OT)  supervision required;verbal cues required  -CH        Time Frame (Transfer Goal 1, OT)  long term goal (LTG);by discharge  -CH        Barriers (Transfers Goal 1, OT)  .  -CH        Progress/Outcome (Transfer Goal 1, OT)  goal ongoing  -           Toileting Goal 1 (OT)    Activity/Device (Toileting Goal 1, OT)  toileting skills, all;commode  -CH        [place] Level/Cues Needed (Toileting Goal 1, OT)  supervision required  -CH        Time Frame (Toileting Goal 1, OT)  long term goal (LTG);by discharge  -CH        Barriers (Toileting Goal 1, OT)  .  -CH        Progress/Outcome (Toileting Goal 1, OT)  goal ongoing  -CH           Patient Education Goal (OT)    Activity (Patient Education Goal, OT)  Pt. will report spinal precautions to max indep in safety post op  -CH        [place]/Cues/Accuracy (Memory Goal 2, OT)  verbalizes understanding  -CH        Time Frame (Patient Education Goal, OT)  long term goal (LTG);by discharge  -CH        Barriers (Patient Education Goal, OT)  .  -CH        Progress/Outcome (Patient Education Goal, OT)  goal ongoing  -CH           Living Environment     Home Accessibility  wheelchair accessible  -          User Key  (r) = Recorded By, (t) = Taken By, (c) = Cosigned By    Initials Name Effective Dates     Inés Dorantes, OTR/L 08/02/16 -     Gabrielle Bynum RN 08/02/16 -          Occupational Therapy Education                 Title: PT OT SLP Therapies (In Progress)     Topic: Occupational Therapy (In Progress)     Point: ADL training (Done)     Description:   Instruct learner(s) on proper safety adaptation and remediation techniques during self care or transfers.   Instruct in proper use of assistive devices.              Learning Progress Summary           Patient Acceptance, E,D, VU,NR by  at 5/14/2020 1605                   Point: Home exercise program (Not Started)     Description:   Instruct learner(s) on appropriate technique for monitoring, assisting and/or progressing therapeutic exercises/activities.              Learner Progress:   Not documented in this visit.          Point: Precautions (Done)     Description:   Instruct learner(s) on prescribed precautions during self-care and functional transfers.              Learning Progress Summary           Patient Acceptance, E,D, VU,NR by  at 5/14/2020 1605                   Point: Body mechanics (Not Started)     Description:   Instruct learner(s) on proper positioning and spine alignment during self-care, functional mobility activities and/or exercises.              Learner Progress:   Not documented in this visit.                      User Key     Initials Effective Dates Name Provider Type [facility] hospital 08/02/16 -  Inés Dorantes OTR/L Occupational Therapist OT                  OT Recommendation and Plan  Outcome Summary/Treatment Plan (OT)  Anticipated Discharge Disposition (OT): home with assist  Planned Therapy Interventions (OT Eval): activity tolerance training, BADL retraining, functional balance retraining, occupation/activity based interventions, orthotic  fabrication/fitting/training, patient/caregiver education/training, transfer/mobility retraining  Therapy Frequency (OT Eval): daily  Plan of Care Review  Plan of Care Reviewed With: patient  Plan of Care Reviewed With: patient    Outcome Measures     Row Name 05/14/20 1346             How much help from another is currently needed...    Putting on and taking off regular lower body clothing?  2  -CH      Bathing (including washing, rinsing, and drying)  2  -CH      Toileting (which includes using toilet bed pan or urinal)  2  -CH      Putting on and taking off regular upper body clothing  3  -CH      Taking care of personal grooming (such as brushing teeth)  3  -CH      Eating meals  3  -CH      AM-PAC 6 Clicks Score (OT)  15  -CH         Functional Assessment    Outcome Measure Options  AM-PAC 6 Clicks Daily Activity (OT)  -        User Key  (r) = Recorded By, (t) = Taken By, (c) = Cosigned By    Initials Name Provider Type     Inés Dorantes OTR/L Occupational Therapist          Time Calculation:   Time Calculation- OT     Row Name 05/14/20 1346             Time Calculation- OT    OT Start Time  1346  -      OT Stop Time  1427  -      OT Time Calculation (min)  41 min  -      OT Received On  05/14/20  -      OT Goal Re-Cert Due Date  05/24/20  -        User Key  (r) = Recorded By, (t) = Taken By, (c) = Cosigned By    Initials Name Provider Type    Inés Kennedy OTR/L Occupational Therapist        Therapy Charges for Today     Code Description Service Date Service Provider Modifiers Qty    [id number]  OT EVAL LOW COMPLEXITY 3 5/14/2020 Inés Dorantes OTR/L GO 1               ILLLIE Cornelius/KAUSHAL  5/14/2020

## 2020-05-14 NOTE — ANESTHESIA POSTPROCEDURE EVALUATION
Patient: Barbara Tapia    Procedure Summary     Date:  05/14/20 Room / Location:   [facility] OR  /  [facility] OR    Anesthesia Start:  0858 Anesthesia Stop:  1131    Procedure:  ANTERIOR CERVICAL DISCECTOMY FUSION C5-7 (N/A Spine Cervical) Diagnosis:  (M54.12)    Surgeon:  FLO Blanchard MD Provider:  Dipesh Hurtado CRNA    Anesthesia Type:  general ASA Status:  3          Anesthesia Type: general    Vitals  Vitals Value Taken Time   /83 5/14/2020 12:41 PM   Temp 99 °F (37.2 °C) 5/14/2020 12:40 PM   Pulse 80 5/14/2020 12:42 PM   Resp 16 5/14/2020 12:40 PM   SpO2 95 % 5/14/2020 12:42 PM   Vitals shown include unvalidated device data.        Post Anesthesia Care and Evaluation    Patient location during evaluation: PACU  Patient participation: complete - patient participated  Level of consciousness: awake and alert  Pain management: adequate  Airway patency: patent  Anesthetic complications: No anesthetic complications    Cardiovascular status: acceptable  Respiratory status: acceptable  Hydration status: acceptable    Comments: Blood pressure 152/83, pulse 80, temperature 99 °F (37.2 °C), temperature source Temporal, resp. rate 16, SpO2 94 %, not currently breastfeeding.    Pt discharged from PACU based on kanwal score >8

## 2020-05-14 NOTE — ANESTHESIA PREPROCEDURE EVALUATION
Anesthesia Evaluation     Patient summary reviewed   history of anesthetic complications: PONV  NPO Solid Status: > 8 hours             Airway   Mallampati: II  TM distance: >3 FB  Neck ROM: full  Dental    (+) poor dentition        Pulmonary    (+) asthma,sleep apnea (bipap) on CPAP,   (-) COPD, not a smoker  Cardiovascular   Exercise tolerance: good (4-7 METS)    ECG reviewed  Patient on routine beta blocker and Beta blocker given within 24 hours of surgery    (+) hypertension, hyperlipidemia,   (-) pacemaker, past MI, angina, cardiac stents      Neuro/Psych  (-) seizures, TIA, CVA  GI/Hepatic/Renal/Endo    (+) morbid obesity,  renal disease CRI, diabetes mellitus using insulin,   (-) GERD, liver disease    Musculoskeletal     Abdominal    Substance History      OB/GYN          Other                        Anesthesia Plan    ASA 3     general     intravenous induction     Anesthetic plan, all risks, benefits, and alternatives have been provided, discussed and informed consent has been obtained with: patient.

## 2020-05-15 ENCOUNTER — READMISSION MANAGEMENT (OUTPATIENT)
Dept: CALL CENTER | Facility: HOSPITAL | Age: 60
End: 2020-05-15

## 2020-05-15 VITALS
SYSTOLIC BLOOD PRESSURE: 138 MMHG | DIASTOLIC BLOOD PRESSURE: 61 MMHG | RESPIRATION RATE: 16 BRPM | TEMPERATURE: 98.2 F | HEART RATE: 73 BPM | OXYGEN SATURATION: 94 %

## 2020-05-15 LAB
ANION GAP SERPL CALCULATED.3IONS-SCNC: 12 MMOL/L (ref 5–15)
BASOPHILS # BLD AUTO: 0.03 10*3/MM3 (ref 0–0.2)
BASOPHILS NFR BLD AUTO: 0.2 % (ref 0–1.5)
BUN BLD-MCNC: 10 MG/DL (ref 8–23)
BUN/CREAT SERPL: 13.2 (ref 7–25)
CALCIUM SPEC-SCNC: 9.2 MG/DL (ref 8.6–10.5)
CHLORIDE SERPL-SCNC: 97 MMOL/L (ref 98–107)
CO2 SERPL-SCNC: 28 MMOL/L (ref 22–29)
CREAT BLD-MCNC: 0.76 MG/DL (ref 0.57–1)
DEPRECATED RDW RBC AUTO: 41.1 FL (ref 37–54)
EOSINOPHIL # BLD AUTO: 0 10*3/MM3 (ref 0–0.4)
EOSINOPHIL NFR BLD AUTO: 0 % (ref 0.3–6.2)
ERYTHROCYTE [DISTWIDTH] IN BLOOD BY AUTOMATED COUNT: 12.7 % (ref 12.3–15.4)
GFR SERPL CREATININE-BSD FRML MDRD: 77 ML/MIN/1.73
GLUCOSE BLD-MCNC: 243 MG/DL (ref 65–99)
GLUCOSE BLDC GLUCOMTR-MCNC: 211 MG/DL (ref 70–130)
GLUCOSE BLDC GLUCOMTR-MCNC: 212 MG/DL (ref 70–130)
HCT VFR BLD AUTO: 38 % (ref 34–46.6)
HGB BLD-MCNC: 13 G/DL (ref 12–15.9)
IMM GRANULOCYTES # BLD AUTO: 0.05 10*3/MM3 (ref 0–0.05)
IMM GRANULOCYTES NFR BLD AUTO: 0.4 % (ref 0–0.5)
LYMPHOCYTES # BLD AUTO: 1.49 10*3/MM3 (ref 0.7–3.1)
LYMPHOCYTES NFR BLD AUTO: 10.5 % (ref 19.6–45.3)
MCH RBC QN AUTO: 30 PG (ref 26.6–33)
MCHC RBC AUTO-ENTMCNC: 34.2 G/DL (ref 31.5–35.7)
MCV RBC AUTO: 87.8 FL (ref 79–97)
MONOCYTES # BLD AUTO: 0.61 10*3/MM3 (ref 0.1–0.9)
MONOCYTES NFR BLD AUTO: 4.3 % (ref 5–12)
NEUTROPHILS # BLD AUTO: 11.97 10*3/MM3 (ref 1.7–7)
NEUTROPHILS NFR BLD AUTO: 84.6 % (ref 42.7–76)
NRBC BLD AUTO-RTO: 0 /100 WBC (ref 0–0.2)
PLATELET # BLD AUTO: 330 10*3/MM3 (ref 140–450)
PMV BLD AUTO: 9.4 FL (ref 6–12)
POTASSIUM BLD-SCNC: 4.4 MMOL/L (ref 3.5–5.2)
RBC # BLD AUTO: 4.33 10*6/MM3 (ref 3.77–5.28)
SODIUM BLD-SCNC: 137 MMOL/L (ref 136–145)
TROPONIN T SERPL-MCNC: <0.01 NG/ML (ref 0–0.03)
TROPONIN T SERPL-MCNC: <0.01 NG/ML (ref 0–0.03)
WBC NRBC COR # BLD: 14.15 10*3/MM3 (ref 3.4–10.8)

## 2020-05-15 PROCEDURE — 84484 ASSAY OF TROPONIN QUANT: CPT | Performed by: INTERNAL MEDICINE

## 2020-05-15 PROCEDURE — 97535 SELF CARE MNGMENT TRAINING: CPT

## 2020-05-15 PROCEDURE — 85025 COMPLETE CBC W/AUTO DIFF WBC: CPT | Performed by: ORTHOPAEDIC SURGERY

## 2020-05-15 PROCEDURE — 94799 UNLISTED PULMONARY SVC/PX: CPT

## 2020-05-15 PROCEDURE — 80048 BASIC METABOLIC PNL TOTAL CA: CPT | Performed by: ORTHOPAEDIC SURGERY

## 2020-05-15 PROCEDURE — 82962 GLUCOSE BLOOD TEST: CPT

## 2020-05-15 PROCEDURE — 25010000003 HYDROMORPHONE 1 MG/ML SOLUTION: Performed by: ORTHOPAEDIC SURGERY

## 2020-05-15 PROCEDURE — 97530 THERAPEUTIC ACTIVITIES: CPT

## 2020-05-15 PROCEDURE — 25010000003 CEFAZOLIN SODIUM-DEXTROSE 2-3 GM-%(50ML) RECONSTITUTED SOLUTION: Performed by: ORTHOPAEDIC SURGERY

## 2020-05-15 PROCEDURE — 97116 GAIT TRAINING THERAPY: CPT

## 2020-05-15 PROCEDURE — 63710000001 INSULIN LISPRO (HUMAN) PER 5 UNITS: Performed by: ORTHOPAEDIC SURGERY

## 2020-05-15 RX ORDER — OXYCODONE AND ACETAMINOPHEN 10; 325 MG/1; MG/1
1 TABLET ORAL EVERY 4 HOURS PRN
Qty: 42 TABLET | Refills: 0
Start: 2020-05-15 | End: 2020-05-24

## 2020-05-15 RX ADMIN — Medication 1 CAPSULE: at 08:09

## 2020-05-15 RX ADMIN — METOPROLOL TARTRATE 50 MG: 50 TABLET, FILM COATED ORAL at 08:09

## 2020-05-15 RX ADMIN — INSULIN LISPRO 5 UNITS: 100 INJECTION, SOLUTION INTRAVENOUS; SUBCUTANEOUS at 08:09

## 2020-05-15 RX ADMIN — HYDROMORPHONE HYDROCHLORIDE 1 MG: 1 INJECTION, SOLUTION INTRAMUSCULAR; INTRAVENOUS; SUBCUTANEOUS at 10:31

## 2020-05-15 RX ADMIN — INSULIN LISPRO 5 UNITS: 100 INJECTION, SOLUTION INTRAVENOUS; SUBCUTANEOUS at 12:14

## 2020-05-15 RX ADMIN — CEFAZOLIN SODIUM 2 G: 2 SOLUTION INTRAVENOUS at 00:31

## 2020-05-15 RX ADMIN — HYDROCHLOROTHIAZIDE 25 MG: 25 TABLET ORAL at 08:09

## 2020-05-15 RX ADMIN — MECLIZINE HYDROCHLORIDE 12.5 MG: 25 TABLET ORAL at 08:08

## 2020-05-15 RX ADMIN — ALBUTEROL SULFATE 2.5 MG: 2.5 SOLUTION RESPIRATORY (INHALATION) at 06:44

## 2020-05-15 RX ADMIN — SODIUM CHLORIDE, PRESERVATIVE FREE 3 ML: 5 INJECTION INTRAVENOUS at 08:09

## 2020-05-15 RX ADMIN — DULOXETINE HYDROCHLORIDE 60 MG: 30 CAPSULE, DELAYED RELEASE ORAL at 08:08

## 2020-05-15 RX ADMIN — OXYCODONE HYDROCHLORIDE AND ACETAMINOPHEN 1 TABLET: 10; 325 TABLET ORAL at 15:18

## 2020-05-15 RX ADMIN — FLUTICASONE PROPIONATE 2 SPRAY: 50 SPRAY, METERED NASAL at 08:08

## 2020-05-15 RX ADMIN — AMLODIPINE BESYLATE 10 MG: 10 TABLET ORAL at 08:08

## 2020-05-15 RX ADMIN — DOCUSATE SODIUM 50 MG AND SENNOSIDES 8.6 MG 1 TABLET: 8.6; 5 TABLET, FILM COATED ORAL at 08:09

## 2020-05-15 RX ADMIN — ALBUTEROL SULFATE 2.5 MG: 2.5 SOLUTION RESPIRATORY (INHALATION) at 10:30

## 2020-05-15 RX ADMIN — OXYCODONE HYDROCHLORIDE AND ACETAMINOPHEN 1 TABLET: 10; 325 TABLET ORAL at 08:18

## 2020-05-15 RX ADMIN — GLIPIZIDE 10 MG: 10 TABLET ORAL at 08:08

## 2020-05-15 RX ADMIN — LOSARTAN POTASSIUM 100 MG: 50 TABLET, FILM COATED ORAL at 08:08

## 2020-05-15 NOTE — PLAN OF CARE
Problem: Patient Care Overview  Goal: Plan of Care Review  Outcome: Ongoing (interventions implemented as appropriate)  Flowsheets (Taken 5/15/2020 4191)  Progress: no change  Plan of Care Reviewed With: patient  Note:   Pt alert and oriented x4. C/o incisional neck pain. Prn pain meds given as ordered with relief noted. Pt also started c/o sharp 10/10 chest pain, she stated that it had been hurting off and on since yesterday. Cardiac alert was called. EKG was normal. Dr. Trevizo thought it was probably related to her hiatal hernia and ordered a gi cocktail which did help relieve the pain. C collar in place. Incision to her neck is well approximated, CDI. No signs of swelling or problems swallowing. Voiding. Upx1 to bathroom. Safety maintained. Will continue to monitor.

## 2020-05-15 NOTE — PROGRESS NOTES
[facility] Medicine Rapid Response Note       Date of Encounter: May 14, 2020    Attending Physician: Dr. Rafa Blanchard    Description of Events: Called to see patient for a cardiac alert with chest pain.  On arrival patient was complaining of substernal chest pain sharp in nature.  She initially felt like it was indigestion and she has a history of reflux.  She is status post cervical surgery yesterday.  She denies any significant shortness of breath cough.  Patient's lungs were clear her heart rate was regular rate and rhythm without gallops murmurs or rubs.  I could hear GI sounds bowel sounds in her chest which probably would go along with a hiatal hernia.  EKG was obtained showed nothing acute no ischemic changes were noted.  Patient was medicated with her pain medications and given a GI cocktail.  Did also order labs turned care back over to her primary and medical consultant.    Approximately 27 minutes of critical care time were spent managing the patient exclusive of billable procedures.             Raghav Trevizo MD  5/14/2020  20:54

## 2020-05-15 NOTE — THERAPY TREATMENT NOTE
Acute Care - Physical Therapy Treatment Note  [facility]     Patient Name: Barbara Tapia  : 1960  MRN: [id number]  Today's Date: 5/15/2020  Onset of Illness/Injury or Date of Surgery: 20     Referring Physician: Dr. Blanchard    Admit Date: 2020    Visit Dx:    ICD-10-CM ICD-9-CM   1. Decreased activities of daily living (ADL) Z78.9 V49.89   2. Difficulty walking R26.2 719.7     Patient Active Problem List   Diagnosis   • Palpitation   • Morbid obesity due to excess calories (CMS/HCC)   • BLADIMIR treated with BiPAP   • Chest pain   • Essential hypertension   • Type 2 diabetes mellitus without complication, without long-term current use of insulin (CMS/HCC)   • Mixed hyperlipidemia   • Anxiety and depression   • Other dysphagia   • Gastroesophageal reflux disease without esophagitis   • Encounter for screening for malignant neoplasm of colon   • Esophageal dysphagia   • Nonsmoker   • Obesity, unspecified obesity severity, unspecified obesity type   • Slow transit constipation   • Sleep apnea   • Obesity, Class III, BMI 40-49.9 (morbid obesity) (CMS/HCC)   • Autonomic orthostatic hypotension   • Cervicalgia   • Cervical radiculopathy due to degenerative joint disease of spine       Therapy Treatment    Rehabilitation Treatment Summary     Row Name 05/15/20 0838             Treatment Time/Intention    Discipline  physical therapy assistant  -AB      Document Type  therapy note (daily note)  -AB      Subjective Information  complains of;weakness;fatigue  -AB2      Mode of Treatment  physical therapy  -AB2      Existing Precautions/Restrictions  fall;spinal  -AB2      Treatment Considerations/Comments  brace worn at all times, cervical  -AB2      Recorded by [AB] Ericka Venegas, PTA 05/15/20 0841  [AB2] Ericka Venegas, AMARA 05/15/20 0856      Row Name 05/15/20 0838             Bed Mobility Assessment/Treatment    Sidelying-Sit [place] (Bed Mobility)  verbal cues;contact guard  -AB      Assistive  Device (Bed Mobility)  head of bed elevated  -AB      Recorded by [AB] Ericka Venegas, PTA 05/15/20 0856      Row Name 05/15/20 0838             Sit-Stand Transfer    Sit-Stand [place] (Transfers)  contact guard  -AB      Recorded by [AB] Ericka Venegas, PTA 05/15/20 0856      Row Name 05/15/20 0838             Stand-Sit Transfer    Stand-Sit [place] (Transfers)  contact guard  -AB      Recorded by [AB] Ericka Venegas, PTA 05/15/20 0856      Row Name 05/15/20 0838             Gait/Stairs Assessment/Training    Gait/Stairs Assessment/Training  gait/ambulation independence  -AB      [place] Level (Gait)  contact guard  -AB      Assistive Device (Gait)  walker, front-wheeled  -AB      Distance in Feet (Gait)  100'  -AB      Deviations/Abnormal Patterns (Gait)  gait speed decreased;base of support, wide  -AB      Comment (Gait/Stairs)  walks on outside of [place] foot due to fusion  -AB      Recorded by [AB] Erikca Venegas, PTA 05/15/20 0856      Row Name 05/15/20 0838             Positioning and Restraints    Pre-Treatment Position  in bed  -AB      Post Treatment Position  bed  -AB      In Bed  fowlers;call light within reach  -AB      Recorded by [AB] Ericka Venegas, PTA 05/15/20 0856      Row Name 05/15/20 0838             Pain Scale: Numbers Pre/Post-Treatment    Pain Scale: Numbers, Pretreatment  6/10  -AB      Pain Scale: Numbers, Post-Treatment  6/10  -AB2      Pain Location  neck  -AB2      Pain Intervention(s)  Repositioned  -AB2      Recorded by [AB] Ericka Venegas, PTA 05/15/20 0856  [AB2] Ericka Venegas, PTA 05/15/20 0902      Row Name                Wound 05/14/20 0945 midline throat Incision    Wound - Properties Group Date first assessed: 05/14/20 [SANDRA] Time first assessed: 0945 [SANDRA] Orientation: midline [SANDRA] Location: throat [SANDRA] Primary Wound Type: Incision [SANDRA] Recorded by:  [SANDRA] Gabrielle Chaparro RN 05/14/20 0954      User Key  (r) = Recorded By, (t) = Taken By, (c) =  Cosigned By    Initials Name Effective Dates Discipline    AB Ericka Venegas, PTA 08/02/16 -  PT    Gabrielle Bynum RN 08/02/16 -  Nurse          Wound 05/14/20 0945 midline throat Incision (Active)   Dressing Appearance open to air 5/15/2020  8:15 AM   Closure Approximated;Liquid skin adhesive 5/15/2020  8:15 AM   Drainage Amount none 5/15/2020  8:15 AM   Dressing Care, Wound other (see comments) 5/14/2020 11:10 AM           Physical Therapy Education                 Title: PT OT SLP Therapies (In Progress)     Topic: Physical Therapy (Done)     Point: Mobility training (Done)     Description:   Instruct learner(s) on safety and technique for assisting patient out of bed, chair or wheelchair.  Instruct in the proper use of assistive devices, such as walker, crutches, cane or brace.              Patient Friendly Description:   It's important to get you on your feet again, but we need to do so in a way that is safe for you. Falling has serious consequences, and your personal safety is the most important thing of all.        When it's time to get out of bed, one of us or a family member will sit next to you on the bed to give you support.     If your doctor or nurse tells you to use a walker, crutches, a cane, or a brace, be sure you use it every time you get out of bed, even if you think you don't need it.    Learning Progress Summary           Patient Acceptance, E,TB,D, MICHELLE,YULISA,NR by  at 5/14/2020 5702    Comment:  Education re: purpose of PT/importance of act; educ for safety/falls prevention & improved tech for mob w/ emphasis on spinal precautions; education for improved stability in standing w/ use of rwx to reduce fall risk & to call for assist w/ mobility                   Point: Precautions (Done)     Description:   Instruct learner(s) on prescribed precautions during mobility and gait tasks              Learning Progress Summary           Patient Acceptance, E,TB,D, MICHELLE,YULISA,NR by  at 5/14/2020  [id number]    Comment:  Education re: purpose of PT/importance of act; educ for safety/falls prevention & improved tech for mob w/ emphasis on spinal precautions; education for improved stability in standing w/ use of rwx to reduce fall risk & to call for assist w/ mobility                               User Key     Initials Effective Dates Name Provider Type Sp CONTEH 08/02/18 -  Shonna Ye, PT Physical Therapist PT                PT Recommendation and Plan     Plan of Care Reviewed With: patient  Progress: improving  Outcome Summary: She was CGA with bedmobility, sit/stand, bathroom t/f and ambulate 100' with Rwx. Recommend Home with assist and HH at D/C.  Outcome Measures     Row Name 05/14/20 1346             How much help from another is currently needed...    Putting on and taking off regular lower body clothing?  2  -CH      Bathing (including washing, rinsing, and drying)  2  -CH      Toileting (which includes using toilet bed pan or urinal)  2  -CH      Putting on and taking off regular upper body clothing  3  -CH      Taking care of personal grooming (such as brushing teeth)  3  -CH      Eating meals  3  -CH      AM-PAC 6 Clicks Score (OT)  15  -CH         Functional Assessment    Outcome Measure Options  AM-PAC 6 Clicks Daily Activity (OT)  -CH        User Key  (r) = Recorded By, (t) = Taken By, (c) = Cosigned By    Initials Name Provider Type    CH Inés Dorantes, OTR/L Occupational Therapist         Time Calculation:   PT Charges     Row Name 05/15/20 0838             Time Calculation    Start Time  0838  -AB      Stop Time  0907  -AB      Time Calculation (min)  29 min  -AB      PT Received On  05/15/20  -AB         Time Calculation- PT    Total Timed Code Minutes- PT  29 minute(s)  -AB        User Key  (r) = Recorded By, (t) = Taken By, (c) = Cosigned By    Initials Name Provider Type    AB Ericka Venegas, PTA Physical Therapy Assistant        Therapy Charges for Today     Code Description  Service Date Service Provider Modifiers Qty    27871573494 HC GAIT TRAINING EA 15 MIN 5/15/2020 Ericka Venegas, PTA GP 1    18769520323 HC PT THERAPEUTIC ACT EA 15 MIN 5/15/2020 Ericka Venegas, PTA GP 1          PT G-Codes  Outcome Measure Options: AM-PAC 6 Clicks Basic Mobility (PT)  AM-PAC 6 Clicks Score (PT): 18  AM-PAC 6 Clicks Score (OT): 15    Ericka Venegas, AMARA  5/15/2020

## 2020-05-15 NOTE — THERAPY DISCHARGE NOTE
Acute Care - Occupational Therapy Treatment Note/Discharge  [facility]     Patient Name: Barbara Tapia  : 1960  MRN: [id number]  Today's Date: 5/15/2020  Onset of Illness/Injury or Date of Surgery: 20  Date of Referral to OT: 20  Referring Physician: Dr. Blanchard      Admit Date: 2020    Visit Dx:     ICD-10-CM ICD-9-CM   1. Cervicalgia M54.2 723.1   2. Decreased activities of daily living (ADL) Z78.9 V49.89   3. Difficulty walking R26.2 719.7     Patient Active Problem List   Diagnosis   • Palpitation   • Morbid obesity due to excess calories (CMS/[facility])   • BLADIMIR treated with BiPAP   • Chest pain   • Essential hypertension   • Type 2 diabetes mellitus without complication, without long-term current use of insulin (CMS/[facility])   • Mixed hyperlipidemia   • Anxiety and depression   • Other dysphagia   • Gastroesophageal reflux disease without esophagitis   • Encounter for screening for malignant neoplasm of colon   • Esophageal dysphagia   • Nonsmoker   • Obesity, unspecified obesity severity, unspecified obesity type   • Slow transit constipation   • Sleep apnea   • Obesity, Class III, BMI 40-49.9 (morbid obesity) (CMS/[facility])   • Autonomic orthostatic hypotension   • Cervicalgia   • Cervical radiculopathy due to degenerative joint disease of spine       Therapy Treatment    Rehabilitation Treatment Summary     Row Name 05/15/20 1425 05/15/20 0838          Treatment Time/Intention    Discipline  occupational therapist  -MW  physical therapy assistant  -AB     Document Type  therapy note (daily note)  -MW  therapy note (daily note)  -AB     Subjective Information  complains of anxiety  -  complains of;weakness;fatigue  -AB2     Mode of Treatment  occupational therapy  -  physical therapy  -AB2     Existing Precautions/Restrictions  fall;spinal [place] at all times  -  fall;spinal  -AB2     Treatment Considerations/Comments  --  brace worn at all times, cervical  -AB2     Recorded by [NEHA] Sho  Roxanne ROSSI OTR/L 05/15/20 1457 [AB] Ericka Venegas, PTA 05/15/20 0841  [AB2] Ericka Venegas, PTA 05/15/20 0856     Row Name 05/15/20 1425             Cognitive Assessment/Intervention- PT/OT    Personal Safety Interventions  fall prevention program maintained;gait belt;muscle strengthening facilitated;nonskid shoes/slippers when out of bed;supervised activity  -MW      Recorded by [MW] Roxanne Berkowitz OTR/L 05/15/20 1457      Row Name 05/15/20 1425 05/15/20 0838          Bed Mobility Assessment/Treatment    Sidelying-Sit [place] (Bed Mobility)  --  verbal cues;contact guard  -AB     Assistive Device (Bed Mobility)  --  head of bed elevated  -AB     Comment (Bed Mobility)  sitting up EOB  -MW  --     Recorded by [MW] Roxanne Berkowitz OTR/L 05/15/20 1457 [AB] Ericka Venegas, PTA 05/15/20 0856     Row Name 05/15/20 1425             Functional Mobility    Functional Mobility- Ind. Level  contact guard assist;standby assist  -MW      Functional Mobility- Comment  amb within room  -MW      Recorded by [MW] Roxanne Berkowitz OTR/L 05/15/20 1457      Row Name 05/15/20 1425             Transfer Assessment/Treatment    Transfer Assessment/Treatment  sit-stand transfer;stand-sit transfer  -MW      Recorded by [MW] Roaxnne Berkowitz OTR/L 05/15/20 1457      Row Name 05/15/20 1425 05/15/20 0838          Sit-Stand Transfer    Sit-Stand [place] (Transfers)  stand by assist  -MW  contact guard  -AB     Recorded by [MW] Roxanne Berkowitz OTR/L 05/15/20 1457 [AB] Ericka Venegas, PTA 05/15/20 0856     Row Name 05/15/20 1425 05/15/20 0838          Stand-Sit Transfer    Stand-Sit [place] (Transfers)  stand by assist  -MW  contact guard  -AB     Recorded by [MW] Roxanne Berkowitz OTR/L 05/15/20 1457 [AB] Ericka Venegas, PTA 05/15/20 0856     Row Name 05/15/20 0838             Gait/Stairs Assessment/Training    Gait/Stairs Assessment/Training  gait/ambulation independence  -AB      [place] Level (Gait)   contact guard  -AB      Assistive Device (Gait)  walker, front-wheeled  -AB      Distance in Feet (Gait)  100'  -AB      Deviations/Abnormal Patterns (Gait)  gait speed decreased;base of support, wide  -AB      Comment (Gait/Stairs)  walks on outside of [place] foot due to fusion  -AB      Recorded by [AB] Ericka Venegas, PTA 05/15/20 0856      Row Name 05/15/20 1425             ADL Assessment/Intervention    BADL Assessment/Intervention  upper body dressing;lower body dressing  -MW      Recorded by [MW] Roxanne Berkowitz OTR/L 05/15/20 1457      Row Name 05/15/20 1425             Upper Body Dressing Assessment/Training    Upper Body Dressing [place] Level  don;pull-over garment;supervision;set up;verbal cues  -MW      Upper Body Dressing Position  unsupported standing  -MW      Recorded by [MW] Roxanne Berkowitz OTR/L 05/15/20 1457      Row Name 05/15/20 1425             Lower Body Dressing Assessment/Training    Lower Body Dressing [place] Level  don;pants/bottoms;supervision;set up  -MW      Lower Body Dressing Position  edge of bed sitting;unsupported standing  -MW      Recorded by [MW] Roxanne Berkowitz OTR/L 05/15/20 1457      Row Name 05/15/20 [facility] 05/15/20 0838          Positioning and Restraints    Pre-Treatment Position  in bed  -MW  in bed  -AB     Post Treatment Position  chair  -MW  bed  -AB     In Bed  --  fowlers;call light within reach  -AB     In Chair  sitting;call light within reach;encouraged to call for assist;with brace  -MW  --     Recorded by [MW] Roxanne Berkowitz OTR/L 05/15/20 1457 [AB] Ericka Venegas, PTA 05/15/20 0856     Row Name 05/15/20 142 05/15/20 0838          Pain Scale: Numbers Pre/Post-Treatment    Pain Scale: Numbers, Pretreatment  6/10  -MW  6/10  -AB     Pain Scale: Numbers, Post-Treatment  6/10  -MW  6/10  -AB2     Pain Location  neck  -MW  neck  -AB2     Pain Intervention(s)  --  Repositioned  -AB2     Recorded by [MW] Roxanne Berkowitz OTR/L 05/15/20 1457 [AB]  Ericka Venegas, PTA 05/15/20 0856  [AB2] Ericka Venegas, PTA 05/15/20 0902     Row Name                Wound 05/14/20 0945 midline throat Incision    Wound - Properties Group Date first assessed: 05/14/20 [SANDRA] Time first assessed: 0945 [SANDRA] Orientation: midline [SANDRA] Location: throat [SANDRA] Primary Wound Type: Incision [SANDRA] Recorded by:  [SANDRA] Gabrielle Chaparro RN 05/14/20 0947    Row Name 05/15/20 1425             Plan of Care Review    Plan of Care Reviewed With  patient  -MW      Progress  improving  -MW      Outcome Summary  OT txt completed. Pt sitting up EOB with brace. Set up and vc for donning pants and pull over shirt. Provided extra pads for bracing, reviewed precautions for shower and maintenace of brace. All questions answered at this time to pt understanding. Pt to d/c home today, OT to sign off.  -MW      Recorded by [MW] Roxanne Berkowitz OTR/L 05/15/20 1457      Row Name 05/15/20 1425             Outcome Summary/Treatment Plan (OT)    Daily Summary of Progress (OT)  progress toward functional goals is good  -MW      Anticipated Discharge Disposition (OT)  home with assist  -MW      Recorded by [MW] Roxanne Berkowitz OTR/L 05/15/20 9030        User Key  (r) = Recorded By, (t) = Taken By, (c) = Cosigned By    Initials Name Effective Dates Discipline    AB Ericka Venegas, PTA 08/02/16 -  PT    Gabrielle Bynum RN 08/02/16 -  Nurse    Roxanne Caballero, OTR/L 08/28/18 -  OT        Wound 05/14/20 0945 midline throat Incision (Active)   Dressing Appearance open to air 5/15/2020  8:15 AM   Closure Approximated;Liquid skin adhesive 5/15/2020  8:15 AM   Drainage Amount none 5/15/2020  8:15 AM       Rehab Goal Summary     Row Name 05/15/20 1400             Transfer Goal 1 (OT)    Activity/Assistive Device (Transfer Goal 1, OT)  sit-to-stand/stand-to-sit;bed-to-chair/chair-to-bed;toilet;commode  -MW      [place] Level/Cues Needed (Transfer Goal 1, OT)  supervision required;verbal cues  required  -MW      Time Frame (Transfer Goal 1, OT)  long term goal (LTG);by discharge  -MW      Barriers (Transfers Goal 1, OT)  .  -MW      Progress/Outcome (Transfer Goal 1, OT)  goal met  -MW         Toileting Goal 1 (OT)    Activity/Device (Toileting Goal 1, OT)  toileting skills, all;commode  -MW      [place] Level/Cues Needed (Toileting Goal 1, OT)  supervision required  -MW      Time Frame (Toileting Goal 1, OT)  long term goal (LTG);by discharge  -MW      Barriers (Toileting Goal 1, OT)  .  -MW      Progress/Outcome (Toileting Goal 1, OT)  goal met  -MW         Patient Education Goal (OT)    Activity (Patient Education Goal, OT)  Pt. will report spinal precautions to max indep in safety post op  -MW      [place]/Cues/Accuracy (Memory Goal 2, OT)  verbalizes understanding  -MW      Time Frame (Patient Education Goal, OT)  long term goal (LTG);by discharge  -MW      Barriers (Patient Education Goal, OT)  .  -MW      Progress/Outcome (Patient Education Goal, OT)  goal met  -MW        User Key  (r) = Recorded By, (t) = Taken By, (c) = Cosigned By    Initials Name Provider Type Discipline    MW Roxanne Berkowitz, OTR/L Occupational Therapist OT          Occupational Therapy Education                 Title: PT OT SLP Therapies (Resolved)     Topic: Occupational Therapy (Resolved)     Point: ADL training (Resolved)     Description:   Instruct learner(s) on proper safety adaptation and remediation techniques during self care or transfers.   Instruct in proper use of assistive devices.              Learning Progress Summary           Patient Acceptance, E,D, VU,NR by  at 5/14/2020 4970                   Point: Home exercise program (Resolved)     Description:   Instruct learner(s) on appropriate technique for monitoring, assisting and/or progressing therapeutic exercises/activities.              Learner Progress:   Not documented in this visit.          Point: Precautions (Resolved)     Description:    Instruct learner(s) on prescribed precautions during self-care and functional transfers.              Learning Progress Summary           Patient Acceptance, E,D, VU,NR by  at 5/14/2020 1605                   Point: Body mechanics (Resolved)     Description:   Instruct learner(s) on proper positioning and spine alignment during self-care, functional mobility activities and/or exercises.              Learner Progress:   Not documented in this visit.                      User Key     Initials Effective Dates Name Provider Type Discipline     08/02/16 -  Inés Dorantes, OTR/L Occupational Therapist OT                OT Recommendation and Plan  Outcome Summary/Treatment Plan (OT)  Daily Summary of Progress (OT): progress toward functional goals is good  Anticipated Discharge Disposition (OT): home with assist  Daily Summary of Progress (OT): progress toward functional goals is good  Plan of Care Review  Plan of Care Reviewed With: patient  Plan of Care Reviewed With: patient  Outcome Summary: OT txt completed. Pt sitting up EOB with brace. Set up and vc for donning pants and pull over shirt. Provided extra pads for bracing, reviewed precautions for shower and maintenace of brace. All questions answered at this time to pt understanding. Pt to d/c home today, OT to sign off.    Outcome Measures     Row Name 05/15/20 1400 05/14/20 1346          How much help from another is currently needed...    Putting on and taking off regular lower body clothing?  3  -MW  2  -CH     Bathing (including washing, rinsing, and drying)  3  -MW  2  -CH     Toileting (which includes using toilet bed pan or urinal)  3  -MW  2  -CH     Putting on and taking off regular upper body clothing  3  -MW  3  -CH     Taking care of personal grooming (such as brushing teeth)  4  -MW  3  -CH     Eating meals  4  -MW  3  -CH     AM-PAC 6 Clicks Score (OT)  20  -MW  15  -CH        Functional Assessment    Outcome Measure Options  AM-PAC 6 Clicks Daily  Activity (OT)  -MW  AM-PAC 6 Clicks Daily Activity (OT)  -       User Key  (r) = Recorded By, (t) = Taken By, (c) = Cosigned By    Initials Name Provider Type    Inés Kennedy OTR/L Occupational Therapist    Roxanne Caballero OTR/L Occupational Therapist           Time Calculation:    Time Calculation- OT     Row Name 05/15/20 1459             Time Calculation- OT    OT Start Time  1425  -MW      OT Stop Time  1450  -MW      OT Time Calculation (min)  25 min  -MW      Total Timed Code Minutes- OT  25 minute(s)  -MW      OT Received On  05/15/20  -        User Key  (r) = Recorded By, (t) = Taken By, (c) = Cosigned By    Initials Name Provider Type     Roxanne Berkowitz OTR/L Occupational Therapist        Therapy Suggested Charges     Code   Minutes Charges    None           Therapy Charges for Today     Code Description Service Date Service Provider Modifiers Qty    [id number] HC OT SELF CARE/MGMT/TRAIN EA 15 MIN 5/15/2020 Roxanne Berkowitz OTR/L GO 2               OT Discharge Summary  Anticipated Discharge Disposition (OT): home with assist  Reason for Discharge: Discharge from facility  Outcomes Achieved: Refer to plan of care for updates on goals achieved  Discharge Destination: Home with assist    LILLIE Davey/KAUSHAL  5/15/2020

## 2020-05-15 NOTE — THERAPY DISCHARGE NOTE
Acute Care - Physical Therapy Discharge Summary  [facility]       Patient Name: Barbara Tapia  : 1960  MRN: [id number]    Today's Date: 5/15/2020  Onset of Illness/Injury or Date of Surgery: 20       Referring Physician: Dr. Blanchard      Admit Date: 2020      PT Recommendation and Plan    Visit Dx:    ICD-10-CM ICD-9-CM   1. Cervicalgia M54.2 723.1   2. Decreased activities of daily living (ADL) Z78.9 V49.89   3. Difficulty walking R26.2 719.7       Outcome Measures     Row Name 05/15/20 1400 20 1346          How much help from another is currently needed...    Putting on and taking off regular lower body clothing?  3  -MW  2  -CH     Bathing (including washing, rinsing, and drying)  3  -MW  2  -CH     Toileting (which includes using toilet bed pan or urinal)  3  -MW  2  -CH     Putting on and taking off regular upper body clothing  3  -MW  3  -CH     Taking care of personal grooming (such as brushing teeth)  4  -MW  3  -CH     Eating meals  4  -MW  3  -CH     AM-PAC 6 Clicks Score (OT)  20  -MW  15  -CH        Functional Assessment    Outcome Measure Options  AM-PAC 6 Clicks Daily Activity (OT)  -MW  AM-PAC 6 Clicks Daily Activity (OT)  -CH       User Key  (r) = Recorded By, (t) = Taken By, (c) = Cosigned By    Initials Name Provider Type    CH Inés Dorantes K, OTR/L Occupational Therapist    MW Roxanne Berkowitz, OTR/L Occupational Therapist          PT Charges     Row Name 05/15/20 0838             Time Calculation    Start Time  0838  -AB      Stop Time  0907  -AB      Time Calculation (min)  29 min  -AB      PT Received On  05/15/20  -AB         Time Calculation- PT    Total Timed Code Minutes- PT  29 minute(s)  -AB        User Key  (r) = Recorded By, (t) = Taken By, (c) = Cosigned By    Initials Name Provider Type    AB Ericka Venegas, AMARA Physical Therapy Assistant          Rehab Goal Summary     Row Name 05/15/20 1500 05/15/20 1400          Bed Mobility Goal 1 (PT)     Activity/Assistive Device (Bed Mobility Goal 1, PT)  rolling to left;rolling to right;scooting;sidelying to sit/sit to sidelying  -MAITE  --     [place] Level/Cues Needed (Bed Mobility Goal 1, PT)  conditional independence  -MAITE  --     Time Frame (Bed Mobility Goal 1, PT)  long term goal (LTG);10 days  -MAITE  --     Progress/Outcomes (Bed Mobility Goal 1, PT)  goal not met  -MAITE  --        Transfer Goal 1 (PT)    Activity/Assistive Device (Transfer Goal 1, PT)  sit-to-stand/stand-to-sit;bed-to-chair/chair-to-bed;walker, rolling  -MAITE  --     [place] Level/Cues Needed (Transfer Goal 1, PT)  conditional independence  -MAITE  --     Time Frame (Transfer Goal 1, PT)  long term goal (LTG);10 days  -MAITE  --     Progress/Outcome (Transfer Goal 1, PT)  goal not met  -MAITE  --        Gait Training Goal 1 (PT)    Activity/Assistive Device (Gait Training Goal 1, PT)  gait (walking locomotion);assistive device use;decrease fall risk;improve balance and speed;increase endurance/gait distance;walker, rolling  -MAITE  --     [place] Level (Gait Training Goal 1, PT)  standby assist  -MAITE  --     Distance (Gait Goal 1, PT)  150 ft  -MAITE  --     Time Frame (Gait Training Goal 1, PT)  long term goal (LTG);10 days  -MAITE  --     Progress/Outcome (Gait Training Goal 1, PT)  goal not met  -MAITE  --        Patient Education Goal (PT)    Activity (Patient Education Goal, PT)  spinal precautions; management of brace  -MAITE  --     [place]/Cues/Accuracy (Memory Goal 2, PT)  demonstrates adequately;independent;verbalizes understanding  -MAITE  --     Time Frame (Patient Education Goal, PT)  long term goal (LTG);10 days  -MAITE  --     Progress/Outcome (Patient Education Goal, PT)  goal met  -MAITE  --        Transfer Goal 1 (OT)    Activity/Assistive Device (Transfer Goal 1, OT)  --  sit-to-stand/stand-to-sit;bed-to-chair/chair-to-bed;toilet;commode  -MW     [place] Level/Cues Needed (Transfer Goal 1, OT)  --  supervision required;verbal cues  required  -MW     Time Frame (Transfer Goal 1, OT)  --  long term goal (LTG);by discharge  -MW     Barriers (Transfers Goal 1, OT)  --  .  -MW     Progress/Outcome (Transfer Goal 1, OT)  --  goal met  -MW        Toileting Goal 1 (OT)    Activity/Device (Toileting Goal 1, OT)  --  toileting skills, all;commode  -MW     [place] Level/Cues Needed (Toileting Goal 1, OT)  --  supervision required  -MW     Time Frame (Toileting Goal 1, OT)  --  long term goal (LTG);by discharge  -MW     Barriers (Toileting Goal 1, OT)  --  .  -MW     Progress/Outcome (Toileting Goal 1, OT)  --  goal met  -MW        Patient Education Goal (OT)    Activity (Patient Education Goal, OT)  --  Pt. will report spinal precautions to max indep in safety post op  -MW     [place]/Cues/Accuracy (Memory Goal 2, OT)  --  verbalizes understanding  -MW     Time Frame (Patient Education Goal, OT)  --  long term goal (LTG);by discharge  -MW     Barriers (Patient Education Goal, OT)  --  .  -MW     Progress/Outcome (Patient Education Goal, OT)  --  goal met  -MW       User Key  (r) = Recorded By, (t) = Taken By, (c) = Cosigned By    Initials Name Provider Type Discipline    Kvng Yang PTA Physical Therapy Assistant PT    Roxanne Caballero, OTR/L Occupational Therapist OT              PT Discharge Summary  Anticipated Discharge Disposition (PT): home with assist  Reason for Discharge: Discharge from facility  Outcomes Achieved: Refer to plan of care for updates on goals achieved  Discharge Destination: Home with assist      Kvng Doll PTA   5/15/2020

## 2020-05-15 NOTE — PROGRESS NOTES
Discharge Planning Assessment  [facility]     Patient Name: Barbara Tapia  MRN: [id number]  Today's Date: 5/15/2020    Admit Date: 5/14/2020    Discharge Needs Assessment     Row Name 05/15/20 0903       Living Environment    Lives With  spouse    Current Living Arrangements  home/apartment/condo    Primary Care Provided by  self    Provides Primary Care For  no one    Family Caregiver if Needed  spouse    Quality of Family Relationships  helpful;involved;supportive    Able to Return to Prior Arrangements  yes       Resource/Environmental Concerns    Resource/Environmental Concerns  none    Transportation Concerns  car, none       Transition Planning    Patient/Family Anticipates Transition to  home with family;home with help/services    Patient/Family Anticipated Services at Transition  none    Transportation Anticipated  family or friend will provide       Discharge Needs Assessment    Readmission Within the Last 30 Days  no previous admission in last 30 days    Concerns to be Addressed  no discharge needs identified;denies needs/concerns at this time    Equipment Currently Used at Home  bipap/cpap;commode;cane, straight;cane, quad;walker, rolling    Anticipated Changes Related to Illness  none    Equipment Needed After Discharge  none    Patient's Choice of Community Agency(s)  [facility]    Discharge Coordination/Progress  Pt has RX coverage and a PCP. Pt plans on discharging home with her  and is requesting [facility] at discharge. Pt states that she has all DME needed at home and denies any other needs at this time. SW will follow and set up HH at discharge when ordered.         Discharge Plan    No documentation.       Destination      Coordination has not been started for this encounter.      Durable Medical Equipment      Coordination has not been started for this encounter.      Dialysis/Infusion      Coordination has not been started for this encounter.      Home Medical Care      Coordination has  not been started for this encounter.      Therapy      Coordination has not been started for this encounter.      Community Resources      Coordination has not been started for this encounter.          Demographic Summary    No documentation.       Functional Status    No documentation.       Psychosocial    No documentation.       Abuse/Neglect    No documentation.       Legal    No documentation.       Substance Abuse    No documentation.       Patient Forms    No documentation.           Kate Colbert

## 2020-05-15 NOTE — OUTREACH NOTE
Prep Survey      Responses   Hinduism facility patient discharged from?  [place]   Is LACE score < 7 ?  Yes   Eligibility  Readm Mgmt   Discharge diagnosis  Cervical discectomy fusion   COVID-19 Test Status  Negative   Does the patient have one of the following disease processes/diagnoses(primary or secondary)?  General Surgery   Does the patient have Home health ordered?  No   Is there a DME ordered?  No   Prep survey completed?  Yes          Kelley Tapia RN

## 2020-05-15 NOTE — CONSULTS
Referring Provider: Dr. Blanchard  Reason for Consultation: Medical management    Patient Care Team:  Austin Ocampo MD as PCP - General (Internal Medicine)  Briana Bartlett MD as PCP - Claims Attributed  Shane Singh MD as Referring Physician (Cardiology)    Chief complaint chronic neck pain    Subjective .     History of present illness:  The patient presents today for surgical correction after failing conservative management.  They have been through anti-inflammatories, muscle relaxers, and pain medication.  The pain has progressed to the point in time where it is affecting their activities of daily living and after being explained all their options, elected to undergo surgical correction.  Their primary care physician does not attend here at [facility]; therefore, I have been asked to take care of their primary medical needs in the perioperative period.  Postoperative pain is as expected.  There are no other precipitating or relieving factors. I have been requested by the Attending Physician to provide Medical Consultation in the perioperative period. The patient understands my role in their hospitalization and agrees with my treatment plan. They understand the importance of follow up with their PCP upon discharge  from [facility] for any concerns or abnormalities.    Pleasant 60-year-old white female who underwent anterior cervical fusion yesterday.  Last night about 10 PM developed chest pain.  Patient with cardiac history.  Patient followed by Dr. Singh.  Rapid response called and hospitalist respond.  EKG and enzymes unremarkable.  Pain resolved with GI cocktail.  No further pain.      REVIEW OF SYSTEMS:    CONSTITUTIONAL:  Negative for anorexia, chills, fevers, night sweats and weight loss  EYES:  negative for eye dryness, icterus and redness  HEENT:   negative for dental problems, epistaxis, facial trauma and thrush, states difficult to swallow since surgery  RESPIRATORY:  negative  for chest tightness, cough, dyspnea on exertion, pneumonia and sputum  CARDIOVASCULAR: See HPI  GASTROINTESTINAL:  negative for abdominal pain, hematemesis, jaundice, melena and rectal bleeding. No significant changes in bowel habits preoperatively.   MUSCULOSKELETAL:  negative for muscle weakness, myalgias and neck pain, outside of surgical issues noted above  NEUROLOGICAL:   negative for dizziness, headaches, seizures, speech problems, tremors and vertigo  INTEGUMENT: negative for pruritus, rash, skin color change and skin lesion(s)         History    Past Medical History:   Diagnosis Date   • Anxiety    • Anxiety    • Arthritis    • Asthma    • Back pain    • Cervicalgia 2020   • Chest pain    • Chronic fatigue    • Chronic kidney disease     Kidneys are only functioning 50 %   • Depression    • Diabetes (CMS/HCC)    • Diabetes mellitus (CMS/HCC)    • Dizzy    • Fibromyalgia    • Fibromyalgia    • Hiatal hernia    • Hypertension    • Infection of skin and subcutaneous tissue due to fungus    • Knee contracture     total   • Palpitations    • Panic attacks    • PONV (postoperative nausea and vomiting)    • Sleep apnea    • Sleep apnea     BiPAP   • Vertigo      Past Surgical History:   Procedure Laterality Date   • ANKLE FUSION Left    • ANKLE SURGERY Left     multiple surgeries   • CARDIAC CATHETERIZATION Left 2017    Procedure: Cardiac Catheterization/Vascular Study;  Surgeon: Shane Singh MD;  Location:  [facility] CATH INVASIVE LOCATION;  Service:    • CARDIAC CATHETERIZATION N/A 2017    Procedure: Coronary angiography;  Surgeon: Shane Singh MD;  Location:  [facility] CATH INVASIVE [facility];  Service:    • CARDIAC CATHETERIZATION N/A 2017    Procedure: Left Heart Cath;  Surgeon: Shane Singh MD;  Location:  [facility] CATH INVASIVE [facility];  Service:    • CERVICAL FUSION      STITCHED UP TO PREVENT LOSING BABY   • CERVIX LESION DESTRUCTION     •  SECTION     • COLONOSCOPY     • COLONOSCOPY N/A  11/21/2017    Procedure: COLONOSCOPY WITH ANESTHESIA;  Surgeon: Raffaele Buchanan MD;  Location:  [facility] ENDOSCOPY;  Service:    • ELBOW PROCEDURE Left     screws placed x 2   • ENDOSCOPY N/A 11/21/2017    Procedure: ESOPHAGOGASTRODUODENOSCOPY WITH ANESTHESIA;  Surgeon: Raffaele Buchanan MD;  Location: [facility] ENDOSCOPY;  Service:    • EXCISION MASS TRUNK      LEFT SIDE CAT SCRATCH FEVER    • EYE SURGERY      cataract removal sam   • REPLACEMENT TOTAL KNEE BILATERAL Bilateral    • ROTATOR CUFF REPAIR     • SINUS SURGERY     • SUBTOTAL HYSTERECTOMY      ovaries still intact   • TENNIS ELBOW RELEASE Right      Family History   Problem Relation Age of Onset   • Heart disease Mother    • Obesity Mother    • Diabetes Mother    • Hypertension Mother    • Stroke Mother    • Heart disease Father    • Stroke Brother    • Heart disease Brother    • Colon cancer Neg Hx    • Colon polyps Neg Hx      Social History     Tobacco Use   • Smoking status: Never Smoker   • Smokeless tobacco: Never Used   Substance Use Topics   • Alcohol use: No   • Drug use: No     Medications Prior to Admission   Medication Sig Dispense Refill Last Dose   • albuterol (PROVENTIL HFA;VENTOLIN HFA) 108 (90 BASE) MCG/ACT inhaler 2 puffs 4 (Four) Times a Day.   5/13/2020 at 2100   • amLODIPine (NORVASC) 10 MG tablet Take 10 mg by mouth Daily.   5/14/2020 at 0500   • carboxymethylcellulose (REFRESH PLUS) 0.5 % solution 3 (Three) Times a Day As Needed for Dry Eyes.   Past Week at Unknown time   • diazePAM (VALIUM) 5 MG tablet Take 5 mg by mouth 3 (Three) Times a Day.   5/14/2020 at 0500   • DULoxetine (CYMBALTA) 60 MG capsule Take 60 mg by mouth 2 (Two) Times a Day.   5/13/2020 at 1000   • fexofenadine (ALLEGRA) 180 MG tablet Take 180 mg by mouth daily   5/13/2020 at 1000   • fluticasone (FLONASE) 50 MCG/ACT nasal spray 2 sprays into the nostril(s) as directed by provider Daily.   Past Week at Unknown time   • glipiZIDE (GLUCOTROL) 10 MG tablet Take 10 mg by  mouth 2 (Two) Times a Day Before Meals.   5/13/2020 at 2100   • hydrochlorothiazide (MICROZIDE) 12.5 MG capsule Take 1 capsule by mouth Daily As Needed (for edema). (Patient taking differently: Take 25 mg by mouth Daily As Needed (for edema).) 90 capsule 3 5/13/2020 at 1000   • insulin glargine (LANTUS) 100 UNIT/ML injection Inject 55 Units under the skin into the appropriate area as directed 2 (Two) Times a Day.   5/13/2020 at 2100   • insulin lispro (humaLOG) 100 UNIT/ML injection Inject  under the skin into the appropriate area as directed 3 (Three) Times a Day Before Meals.   5/13/2020 at 1400   • ketoconazole (NIZORAL) 200 MG tablet Daily As Needed.   Past Week at Unknown time   • losartan (COZAAR) 50 MG tablet Take 1 tablet by mouth Daily. (Patient taking differently: Take 100 mg by mouth Daily.) 90 tablet 3 5/13/2020 at 1000   • meclizine (ANTIVERT) 12.5 MG tablet Take 12.5 mg by mouth 3 (Three) Times a Day.   5/13/2020 at 2100   • metoprolol tartrate (LOPRESSOR) 50 MG tablet Take 1 tablet by mouth Every 12 (Twelve) Hours. 180 tablet 3 5/14/2020 at 0500   • nystatin (MYCOSTATIN) 972463 UNIT/ML suspension Take 500,000 Units by mouth As Needed.   Past Week at Unknown time   • probiotic (CULTURELLE) capsule capsule Take 1 capsule by mouth Daily.   5/13/2020 at 1000   • simvastatin (ZOCOR) 10 MG tablet Take 10 mg by mouth Every Night.   5/13/2020 at 2100   • tiZANidine (ZANAFLEX) 4 MG tablet 4 mg 3 (Three) Times a Day As Needed for Muscle Spasms.   5/13/2020 at 2100   • traMADol (ULTRAM) 50 MG tablet 50 mg Every 6 (Six) Hours As Needed for Moderate Pain .   5/14/2020 at 0500       Allergies:  Azelastine-fluticasone; Contrast dye; Iodides; Metformin and related; Oxycodone-aspirin; Nisoldipine er; Pregabalin; and Sulfa antibiotics    Objective     Vital Signs   Temp:  [97.7 °F (36.5 °C)-99 °F (37.2 °C)] 98.3 °F (36.8 °C)  Heart Rate:  [67-91] 85  Resp:  [14-18] 16  BP: (135-190)/(60-91) 148/60          Physical  Exam:  Constitutional: oriented to person, place, and time. appears well-developed.   HEENT: Neck is currently in c-collar with some edema surrounding incision  Head: Normocephalic and atraumatic.   Eyes: Pupils are equal, round, and reactive to light.      Cardiovascular: Regular rhythm and normal heart sounds.    Pulmonary/Chest: Effort normal and breath sounds normal. CTAB  Abdominal: Soft. Bowel sounds are normal to hypoactive. No significant distension. There is no tenderness. There is no rebound and no guarding.   Musculoskeletal: Normal range of motion and no edema or tenderness outside of surgical area.   Neurological: Pt is alert and oriented to person, place, and time.  normal reflexes present.   Skin: Skin is warm and dry.     Results Review:   I reviewed the patient's new imaging results and agree with the interpretation.      Assessment/Plan       Morbid obesity due to excess calories (CMS/HCC)    Chest pain    Essential hypertension    Gastroesophageal reflux disease without esophagitis    Obesity, Class III, BMI 40-49.9 (morbid obesity) (CMS/HCC)    Autonomic orthostatic hypotension    Cervicalgia    Cervical radiculopathy due to degenerative joint disease of spine      Chest pain-negative work-up.  Resolved with GI cocktail.  No further episodes.  Will set up outpatient follow-up with Dr. Singh in 4 to 6 weeks.  If discharged home later today instructed patient to go to the emergency room if chest pain returns.    Generalized anxiety disorder- judicious use of opioid analgesics and benzodiazepines for anxiety especially with body habitus and propensity for obstructive sleep apnea.    GERD-exacerbated by pain medications and anesthesia, will add PPI and or H2 blocker as needed and can step up to Carafate 1 gm po before each meal and nightly. Patient educated about smaller portions with more frequent feedings. Patient also instructed on early mobilization to help with return of normal peristalsis of  gut.          Hypertension-review pre and post BP's,will restart home BP meds when BP allows. Recent studies demonstrate the need for patience and less reactivity for precipitous drop of BP in the acute care setting. Will educate staff to use other modalities to help reduce MAP prior to adding additional medical interventions. Add clonidine 0.1 mg every 4 hours prn if bp> 140/90,monitor BP and adjust meds as necessary.      Explained to patient and staff that we were consulted for medical management during their acute care hospitalization. The Medical Consultation was requested by the Attending Physician. The patient has been recommended to f/u with their regular primary care provider concerning any further treatment and review of abnormalities found during their hospitalization at [facility].They agree with the treatment plan as well as understand our role in their hospitalization. All questions were encouraged and answered to best of my ability.          I discussed the patient's findings and my recommendations with patient, nursing staff and consulting provider    Guy Dunbar MD  05/15/20  09:04

## 2020-05-15 NOTE — PLAN OF CARE
Problem: Patient Care Overview  Goal: Plan of Care Review  5/15/2020 5911 by Roxanne Berkowitz, OTR/L  Outcome: Outcome(s) achieved  Flowsheets (Taken 5/15/2020 1425)  Progress: improving  Plan of Care Reviewed With: patient  Outcome Summary: OT txt completed. Pt sitting up EOB with brace. Set up and vc for donning pants and pull over shirt. Provided extra pads for bracing, reviewed precautions for shower and maintenace of brace. All questions answered at this time to pt understanding. Pt to d/c home today, OT to sign off.

## 2020-05-15 NOTE — DISCHARGE SUMMARY
Date of Discharge:  5/15/2020    Admission Diagnosis: M54.12    Discharge Diagnosis:   1.  Increasing chronic neck pain.    2.  Bilateral shoulder and arm radiculopathy.   3.  Bilateral upper extremity weakness.    4.  Degenerative disk disease, C5 to 7.    5.  Disk herniation, left C5-6.    6.  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    7. S/p ACDF with instrumentation C5-6, C6-7, 5/14/2020    Consults During Admission: None    Hospital Course  Patient is a 60 y.o. female Known to our practice. Admitted for the above cervical fusion.  This has been well tolerated and the patient will be discharged home today in good stable condition with instructions for brace at all times.  No driving until directed.  Patient will follow-up with Dr. Blanchard's clinic in two weeks. They will call if problems arise.         Condition on Discharge:  STABLE    Vital Signs  Temp:  [97.7 °F (36.5 °C)-98.6 °F (37 °C)] 98.2 °F (36.8 °C)  Heart Rate:  [67-88] 73  Resp:  [14-18] 16  BP: (135-165)/(60-87) 138/61    Physical Exam:   Alert and oriented ×3, no acute distress, grossly neurovascularly intact, vital signs stable, dressing clean dry and intact, moving all extremities without focal deficit      Discharge Disposition  Home or Self Care    Discharge Medications     Discharge Medications      New Medications      Instructions Start Date   oxyCODONE-acetaminophen  MG per tablet  Commonly known as:  PERCOCET   1 tablet, Oral, Every 4 Hours PRN         Changes to Medications      Instructions Start Date   hydroCHLOROthiazide 12.5 MG capsule  Commonly known as:  MICROZIDE  What changed:  how much to take   12.5 mg, Oral, Daily PRN      losartan 50 MG tablet  Commonly known as:  Cozaar  What changed:  how much to take   50 mg, Oral, Daily         Continue These Medications      Instructions Start Date   albuterol sulfate  (90 Base) MCG/ACT inhaler  Commonly known as:  PROVENTIL HFA;VENTOLIN HFA;PROAIR HFA   2 puffs,  4 Times Daily - RT      amLODIPine 10 MG tablet  Commonly known as:  NORVASC   10 mg, Oral, Daily      carboxymethylcellulose 0.5 % solution  Commonly known as:  REFRESH PLUS   3 Times Daily PRN      diazePAM 5 MG tablet  Commonly known as:  VALIUM   5 mg, Oral, 3 Times Daily      DULoxetine 60 MG capsule  Commonly known as:  CYMBALTA   60 mg, Oral, 2 Times Daily      fexofenadine 180 MG tablet  Commonly known as:  ALLEGRA   Take 180 mg by mouth daily      fluticasone 50 MCG/ACT nasal spray  Commonly known as:  FLONASE   2 sprays, Nasal, Daily      glipizide 10 MG tablet  Commonly known as:  GLUCOTROL   10 mg, Oral, 2 Times Daily Before Meals      insulin glargine 100 UNIT/ML injection  Commonly known as:  LANTUS   55 Units, Subcutaneous, 2 Times Daily      insulin lispro 100 UNIT/ML injection  Commonly known as:  humaLOG   Subcutaneous, 3 Times Daily Before Meals      ketoconazole 200 MG tablet  Commonly known as:  NIZORAL   Daily PRN      meclizine 12.5 MG tablet  Commonly known as:  ANTIVERT   12.5 mg, Oral, 3 Times Daily      metoprolol tartrate 50 MG tablet  Commonly known as:  LOPRESSOR   50 mg, Oral, Every 12 Hours Scheduled      nystatin 104788 UNIT/ML suspension  Commonly known as:  MYCOSTATIN   500,000 Units, Oral, As Needed      probiotic capsule capsule   1 capsule, Oral, Daily      simvastatin 10 MG tablet  Commonly known as:  ZOCOR   10 mg, Oral, Nightly      tiZANidine 4 MG tablet  Commonly known as:  ZANAFLEX   4 mg, 3 Times Daily PRN         Stop These Medications    traMADol 50 MG tablet  Commonly known as:  ULTRAM            Discharge Diet: Resume Home diet, advance as tolerated    Activity at Discharge: Resume home activity advace as tolerated, no lifting, no twisting, no bending, brace as directed, no driving until directed.     Follow-up Appointments  Followup with PCP within one week  Followup [facility] at 2weeks post-op         Kwasi Christianson PA-C  05/15/20  13:09

## 2020-05-15 NOTE — PLAN OF CARE
Problem: Patient Care Overview  Goal: Plan of Care Review  Outcome: Ongoing (interventions implemented as appropriate)  Flowsheets (Taken 5/15/2020 0840)  Progress: improving  Plan of Care Reviewed With: patient  Outcome Summary: She was CGA with bedmobility, sit/stand, bathroom t/f and ambulate 100' with Rwx. Recommend Home with assist and HH at D/C.

## 2020-05-15 NOTE — THERAPY TREATMENT NOTE
Acute Care - Occupational Therapy Treatment Note/Discharge Note   [place]     Patient Name: Barbara Tapia  : 1960  MRN: [id number]  Today's Date: 5/15/2020  Onset of Illness/Injury or Date of Surgery: 20  Date of Referral to OT: 20  Referring Physician: Dr. Blanchard    Admit Date: 2020       ICD-10-CM ICD-9-CM   1. Cervicalgia M54.2 723.1   2. Decreased activities of daily living (ADL) Z78.9 V49.89   3. Difficulty walking R26.2 719.7     Patient Active Problem List   Diagnosis   • Palpitation   • Morbid obesity due to excess calories (CMS/HCC)   • BLADIMIR treated with BiPAP   • Chest pain   • Essential hypertension   • Type 2 diabetes mellitus without complication, without long-term current use of insulin (CMS/HCC)   • Mixed hyperlipidemia   • Anxiety and depression   • Other dysphagia   • Gastroesophageal reflux disease without esophagitis   • Encounter for screening for malignant neoplasm of colon   • Esophageal dysphagia   • Nonsmoker   • Obesity, unspecified obesity severity, unspecified obesity type   • Slow transit constipation   • Sleep apnea   • Obesity, Class III, BMI 40-49.9 (morbid obesity) (CMS/HCC)   • Autonomic orthostatic hypotension   • Cervicalgia   • Cervical radiculopathy due to degenerative joint disease of spine     Past Medical History:   Diagnosis Date   • Anxiety    • Anxiety    • Arthritis    • Asthma    • Back pain    • Cervicalgia 2020   • Chest pain    • Chronic fatigue    • Chronic kidney disease     Kidneys are only functioning 50 %   • Depression    • Diabetes (CMS/HCC)    • Diabetes mellitus (CMS/HCC)    • Dizzy    • Fibromyalgia    • Fibromyalgia    • Hiatal hernia    • Hypertension    • Infection of skin and subcutaneous tissue due to fungus    • Knee contracture     total   • Palpitations    • Panic attacks    • PONV (postoperative nausea and vomiting)    • Sleep apnea    • Sleep apnea     BiPAP   • Vertigo      Past Surgical History:   Procedure  Laterality Date   • ANKLE FUSION Left    • ANKLE SURGERY Left     multiple surgeries   • CARDIAC CATHETERIZATION Left 2017    Procedure: Cardiac Catheterization/Vascular Study;  Surgeon: Shane Singh MD;  Location: [facility] CATH INVASIVE LOCATION;  Service:    • CARDIAC CATHETERIZATION N/A 2017    Procedure: Coronary angiography;  Surgeon: Shane Singh MD;  Location: [facility] CATH INVASIVE LOCATION;  Service:    • CARDIAC CATHETERIZATION N/A 2017    Procedure: Left Heart Cath;  Surgeon: Shane Singh MD;  Location:  [facility] CATH INVASIVE LOCATION;  Service:    • CERVICAL FUSION      STITCHED UP TO PREVENT LOSING BABY   • CERVIX LESION DESTRUCTION     •  SECTION     • COLONOSCOPY     • COLONOSCOPY N/A 2017    Procedure: COLONOSCOPY WITH ANESTHESIA;  Surgeon: Raffaele Buchanan MD;  Location: [facility] ENDOSCOPY;  Service:    • ELBOW PROCEDURE Left     screws placed x 2   • ENDOSCOPY N/A 2017    Procedure: ESOPHAGOGASTRODUODENOSCOPY WITH ANESTHESIA;  Surgeon: Raffaele Buchanan MD;  Location: [facility] ENDOSCOPY;  Service:    • EXCISION MASS TRUNK      LEFT SIDE CAT SCRATCH FEVER    • EYE SURGERY      cataract removal sam   • REPLACEMENT TOTAL KNEE BILATERAL Bilateral    • ROTATOR CUFF REPAIR     • SINUS SURGERY     • SUBTOTAL HYSTERECTOMY      ovaries still intact   • TENNIS ELBOW RELEASE Right        Therapy Treatment    Rehabilitation Treatment Summary     Row Name 05/15/20 1425 05/15/20 0838          Treatment Time/Intention    Discipline  occupational therapist  -MW  physical therapy assistant  -AB     Document Type  therapy note (daily note)  -MW  therapy note (daily note)  -AB     Subjective Information  complains of anxiety  -MW  complains of;weakness;fatigue  -AB2     Mode of Treatment  occupational therapy  -MW  physical therapy  -AB2     Existing Precautions/Restrictions  fall;spinal [place] at all times  -MW  fall;spinal  -AB2     Treatment Considerations/Comments  --  brace worn at all  times, cervical  -AB2     Recorded by [MW] Roxanne Berkowitz OTR/L 05/15/20 1457 [AB] Ericka Venegas, PTA 05/15/20 0841  [AB2] Ericka Venegas, PTA 05/15/20 0856     Row Name 05/15/20 1425             Cognitive Assessment/Intervention- PT/OT    Personal Safety Interventions  fall prevention program maintained;gait belt;muscle strengthening facilitated;nonskid shoes/slippers when out of bed;supervised activity  -MW      Recorded by [MW] Roxanne Berkowitz OTR/L 05/15/20 1457      Row Name 05/15/20 1425 05/15/20 0838          Bed Mobility Assessment/Treatment    Sidelying-Sit Zahl (Bed Mobility)  --  verbal cues;contact guard  -AB     Assistive Device (Bed Mobility)  --  head of bed elevated  -AB     Comment (Bed Mobility)  sitting up EOB  -MW  --     Recorded by [MW] Roxanne Berkowitz OTR/L 05/15/20 1457 [AB] Ericka Venegas, PTA 05/15/20 0856     Row Name 05/15/20 1425             Functional Mobility    Functional Mobility- Ind. Level  contact guard assist;standby assist  -MW      Functional Mobility- Comment  amb within room  -MW      Recorded by [MW] Roxanne Berkowitz OTR/L 05/15/20 1457      Row Name 05/15/20 1425             Transfer Assessment/Treatment    Transfer Assessment/Treatment  sit-stand transfer;stand-sit transfer  -MW      Recorded by [MW] Roxanne Berkowitz OTR/L 05/15/20 1457      Row Name 05/15/20 1425 05/15/20 0838          Sit-Stand Transfer    Sit-Stand Zahl (Transfers)  stand by assist  -  contact guard  -AB     Recorded by [MW] Roxanne Berkowitz OTR/L 05/15/20 1457 [AB] Ericka Venegas, PTA 05/15/20 0856     Row Name 05/15/20 1425 05/15/20 0838          Stand-Sit Transfer    Stand-Sit Zahl (Transfers)  stand by assist  -  contact guard  -AB     Recorded by [MW] Roxanne Berkowitz OTR/L 05/15/20 1457 [AB] Ericka Venegas, PTA 05/15/20 0856     Row Name 05/15/20 0838             Gait/Stairs Assessment/Training    Gait/Stairs Assessment/Training  gait/ambulation  independence  -AB      Worth Level (Gait)  contact guard  -AB      Assistive Device (Gait)  walker, front-wheeled  -AB      Distance in Feet (Gait)  100'  -AB      Deviations/Abnormal Patterns (Gait)  gait speed decreased;base of support, wide  -AB      Comment (Gait/Stairs)  walks on outside of Ledt foot due to fusion  -AB      Recorded by [AB] Ericka Venegas, PTA 05/15/20 0856      Row Name 05/15/20 1425             ADL Assessment/Intervention    BADL Assessment/Intervention  upper body dressing;lower body dressing  -MW      Recorded by [MW] Roxanne Berkowitz, OTR/L 05/15/20 1457      Row Name 05/15/20 1425             Upper Body Dressing Assessment/Training    Upper Body Dressing [place] Level  don;pull-over garment;supervision;set up;verbal cues  -MW      Upper Body Dressing Position  unsupported standing  -MW      Recorded by [MW] Roxanne Berkowitz OTR/L 05/15/20 1457      Row Name 05/15/20 1425             Lower Body Dressing Assessment/Training    Lower Body Dressing [place] Level  don;pants/bottoms;supervision;set up  -MW      Lower Body Dressing Position  edge of bed sitting;unsupported standing  -MW      Recorded by [MW] Roxanne Berkowitz, OTR/L 05/15/20 1457      Row Name 05/15/20 1425 05/15/20 0838          Positioning and Restraints    Pre-Treatment Position  in bed  -MW  in bed  -AB     Post Treatment Position  chair  -MW  bed  -AB     In Bed  --  fowlers;call light within reach  -AB     In Chair  sitting;call light within reach;encouraged to call for assist;with brace  -MW  --     Recorded by [MW] Roxanne Berkwoitz, OTR/L 05/15/20 1457 [AB] Ericka Venegas, PTA 05/15/20 0856     Row Name 05/15/20 1425 05/15/20 0838          Pain Scale: Numbers Pre/Post-Treatment    Pain Scale: Numbers, Pretreatment  6/10  -MW  6/10  -AB     Pain Scale: Numbers, Post-Treatment  6/10  -MW  6/10  -AB2     Pain Location  neck  -MW  neck  -AB2     Pain Intervention(s)  --  Repositioned  -AB2     Recorded  by [MW] Roxanne Berkowitz OTR/L 05/15/20 1457 [AB] Ericka Venegas, PTA 05/15/20 0856  [AB2] Ericka Venegas, PTA 05/15/20 0902     Row Name                Wound 05/14/20 0945 midline throat Incision    Wound - Properties Group Date first assessed: 05/14/20 [SANDRA] Time first assessed: 0945 [SANDRA] Orientation: midline [SANDRA] Location: throat [SANDRA] Primary Wound Type: Incision [SANDRA] Recorded by:  [SANDRA] Gabrielle Chaparro RN 05/14/20 0947    Row Name 05/15/20 1425             Plan of Care Review    Plan of Care Reviewed With  patient  -MW      Progress  improving  -MW      Outcome Summary  OT txt completed. Pt sitting up EOB with brace. Set up and vc for donning pants and pull over shirt. Provided extra pads for bracing, reviewed precautions for shower and maintenace of brace. All questions answered at this time to pt understanding. Pt to d/c home today, OT to sign off.  -MW      Recorded by [MW] Roxanne Berkowitz OTR/L 05/15/20 1457      Row Name 05/15/20 1425             Outcome Summary/Treatment Plan (OT)    Daily Summary of Progress (OT)  progress toward functional goals is good  -MW      Anticipated Discharge Disposition (OT)  home with assist  -MW      Recorded by [MW] Roxanne Berkowitz, OTR/L 05/15/20 1457        User Key  (r) = Recorded By, (t) = Taken By, (c) = Cosigned By    Initials Name Effective Dates Discipline    AB Ericka Venegas PTA 08/02/16 -  PT    Gabrielle Bynum RN 08/02/16 -  Nurse    Roxanne Caballero, OTR/L 08/28/18 -  OT        Wound 05/14/20 0945 midline throat Incision (Active)   Dressing Appearance open to air 5/15/2020  8:15 AM   Closure Approximated;Liquid skin adhesive 5/15/2020  8:15 AM   Drainage Amount none 5/15/2020  8:15 AM     Rehab Goal Summary     Row Name 05/15/20 1400             Transfer Goal 1 (OT)    Activity/Assistive Device (Transfer Goal 1, OT)  sit-to-stand/stand-to-sit;bed-to-chair/chair-to-bed;toilet;commode  -MW      [place] Level/Cues Needed (Transfer  Goal 1, OT)  supervision required;verbal cues required  -MW      Time Frame (Transfer Goal 1, OT)  long term goal (LTG);by discharge  -MW      Barriers (Transfers Goal 1, OT)  .  -MW      Progress/Outcome (Transfer Goal 1, OT)  goal met  -MW         Toileting Goal 1 (OT)    Activity/Device (Toileting Goal 1, OT)  toileting skills, all;commode  -MW      [place] Level/Cues Needed (Toileting Goal 1, OT)  supervision required  -MW      Time Frame (Toileting Goal 1, OT)  long term goal (LTG);by discharge  -MW      Barriers (Toileting Goal 1, OT)  .  -MW      Progress/Outcome (Toileting Goal 1, OT)  goal met  -MW         Patient Education Goal (OT)    Activity (Patient Education Goal, OT)  Pt. will report spinal precautions to max indep in safety post op  -MW      [place]/Cues/Accuracy (Memory Goal 2, OT)  verbalizes understanding  -MW      Time Frame (Patient Education Goal, OT)  long term goal (LTG);by discharge  -MW      Barriers (Patient Education Goal, OT)  .  -MW      Progress/Outcome (Patient Education Goal, OT)  goal met  -MW        User Key  (r) = Recorded By, (t) = Taken By, (c) = Cosigned By    Initials Name Provider Type Discipline    MW Roxanne Berkowitz, OTR/L Occupational Therapist OT        Occupational Therapy Education                 Title: PT OT SLP Therapies (Resolved)     Topic: Occupational Therapy (Resolved)     Point: ADL training (Resolved)     Description:   Instruct learner(s) on proper safety adaptation and remediation techniques during self care or transfers.   Instruct in proper use of assistive devices.              Learning Progress Summary           Patient Acceptance, E,D, VU,NR by  at 5/14/2020 1600                   Point: Home exercise program (Resolved)     Description:   Instruct learner(s) on appropriate technique for monitoring, assisting and/or progressing therapeutic exercises/activities.              Learner Progress:   Not documented in this visit.          Point:  Precautions (Resolved)     Description:   Instruct learner(s) on prescribed precautions during self-care and functional transfers.              Learning Progress Summary           Patient Acceptance, E,D, VU,NR by  at 5/14/2020 1605                   Point: Body mechanics (Resolved)     Description:   Instruct learner(s) on proper positioning and spine alignment during self-care, functional mobility activities and/or exercises.              Learner Progress:   Not documented in this visit.                      User Key     Initials Effective Dates Name Provider Type Discipline     08/02/16 -  Inés Dorantes, OTR/L Occupational Therapist OT                OT Recommendation and Plan  Outcome Summary/Treatment Plan (OT)  Daily Summary of Progress (OT): progress toward functional goals is good  Anticipated Discharge Disposition (OT): home with assist  Daily Summary of Progress (OT): progress toward functional goals is good  Plan of Care Review  Plan of Care Reviewed With: patient  Plan of Care Reviewed With: patient  Outcome Summary: OT txt completed. Pt sitting up EOB with brace. Set up and vc for donning pants and pull over shirt. Provided extra pads for bracing, reviewed precautions for shower and maintenace of brace. All questions answered at this time to pt understanding. Pt to d/c home today, OT to sign off.  Outcome Measures     Row Name 05/15/20 1400 05/14/20 1346          How much help from another is currently needed...    Putting on and taking off regular lower body clothing?  3  -MW  2  -CH     Bathing (including washing, rinsing, and drying)  3  -MW  2  -CH     Toileting (which includes using toilet bed pan or urinal)  3  -MW  2  -CH     Putting on and taking off regular upper body clothing  3  -MW  3  -CH     Taking care of personal grooming (such as brushing teeth)  4  -MW  3  -CH     Eating meals  4  -MW  3  -CH     AM-PAC 6 Clicks Score (OT)  20  -MW  15  -CH        Functional Assessment    Outcome  Measure Options  AM-PAC 6 Clicks Daily Activity (OT)  -  AM-PAC 6 Clicks Daily Activity (OT)  -       User Key  (r) = Recorded By, (t) = Taken By, (c) = Cosigned By    Initials Name Provider Type     Inés Dorantes, OTR/L Occupational Therapist     Roxanne Berkowitz OTR/L Occupational Therapist           Time Calculation:   Time Calculation- OT     Row Name 05/15/20 1459             Time Calculation- OT    OT Start Time  1425  -MW      OT Stop Time  1450  -MW      OT Time Calculation (min)  25 min  -MW      Total Timed Code Minutes- OT  25 minute(s)  -MW      OT Received On  05/15/20  -        User Key  (r) = Recorded By, (t) = Taken By, (c) = Cosigned By    Initials Name Provider Type    Roxanne Caballero, OTR/L Occupational Therapist                 LILLIE Davey/KAUSHAL  5/15/2020

## 2020-05-15 NOTE — PROGRESS NOTES
Continued Stay Note   [place]     Patient Name: Barbara Tapia  MRN: [id number]  Today's Date: 5/15/2020    Admit Date: 5/14/2020    Discharge Plan     Row Name 05/15/20 1349       Plan    Plan Comments  Pt is being discharged home today. SW received phone call from [facility],  of , who states that Dr. Blanchard does not order HH. Pt will be discharged home with no HH orders.    Final Discharge Disposition Code  01 - home or self-care        Discharge Codes    No documentation.       Expected Discharge Date and Time     Expected Discharge Date Expected Discharge Time    May 15, 2020             Kate Colbert

## 2020-05-26 ENCOUNTER — OFFICE VISIT (OUTPATIENT)
Dept: CARDIOLOGY | Facility: CLINIC | Age: 60
End: 2020-05-26

## 2020-05-26 VITALS
BODY MASS INDEX: 42.53 KG/M2 | HEART RATE: 67 BPM | SYSTOLIC BLOOD PRESSURE: 125 MMHG | OXYGEN SATURATION: 98 % | DIASTOLIC BLOOD PRESSURE: 82 MMHG | WEIGHT: 271 LBS | HEIGHT: 67 IN

## 2020-05-26 DIAGNOSIS — I10 ESSENTIAL HYPERTENSION: ICD-10-CM

## 2020-05-26 DIAGNOSIS — R00.2 PALPITATION: ICD-10-CM

## 2020-05-26 DIAGNOSIS — R07.89 OTHER CHEST PAIN: Primary | ICD-10-CM

## 2020-05-26 DIAGNOSIS — K21.9 GASTROESOPHAGEAL REFLUX DISEASE WITHOUT ESOPHAGITIS: ICD-10-CM

## 2020-05-26 DIAGNOSIS — Z79.4 TYPE 2 DIABETES MELLITUS WITH HYPERGLYCEMIA, WITH LONG-TERM CURRENT USE OF INSULIN (HCC): ICD-10-CM

## 2020-05-26 DIAGNOSIS — I25.10 NONOBSTRUCTIVE ATHEROSCLEROSIS OF CORONARY ARTERY: ICD-10-CM

## 2020-05-26 DIAGNOSIS — E78.2 MIXED HYPERLIPIDEMIA: ICD-10-CM

## 2020-05-26 DIAGNOSIS — G47.30 SLEEP APNEA, UNSPECIFIED TYPE: ICD-10-CM

## 2020-05-26 DIAGNOSIS — E11.65 TYPE 2 DIABETES MELLITUS WITH HYPERGLYCEMIA, WITH LONG-TERM CURRENT USE OF INSULIN (HCC): ICD-10-CM

## 2020-05-26 DIAGNOSIS — E66.01 MORBID OBESITY DUE TO EXCESS CALORIES (HCC): ICD-10-CM

## 2020-05-26 PROCEDURE — 99214 OFFICE O/P EST MOD 30 MIN: CPT | Performed by: NURSE PRACTITIONER

## 2020-05-26 PROCEDURE — 93000 ELECTROCARDIOGRAM COMPLETE: CPT | Performed by: NURSE PRACTITIONER

## 2020-05-26 RX ORDER — HYDROCODONE BITARTRATE AND ACETAMINOPHEN 10; 325 MG/1; MG/1
1 TABLET ORAL EVERY 6 HOURS PRN
COMMUNITY
End: 2021-05-17

## 2020-05-26 RX ORDER — TRAMADOL HYDROCHLORIDE 50 MG/1
50 TABLET ORAL EVERY 6 HOURS PRN
COMMUNITY

## 2020-05-26 NOTE — PROGRESS NOTES
Subjective:     Encounter Date:05/26/2020      Patient ID: Barbara Tapia is a 60 y.o. female with a previous medical history of nonobstructive coronary artery disease, hypertension, diabetes mellitus, gastroesophageal reflux disease, sleep apnea, morbid obesity. She was recently hospitalized for cervical discectomy with fusion.  She presents our office today for discharge follow-up.    Chief Complaint: discharge follow up  Hypertension   This is a chronic problem. The current episode started more than 1 year ago. The problem is controlled. Associated symptoms include anxiety, chest pain (sharp pinpoint pain ), malaise/fatigue, neck pain, palpitations and shortness of breath. Pertinent negatives include no orthopnea, peripheral edema or PND. Risk factors for coronary artery disease include diabetes mellitus and obesity. Current antihypertension treatment includes calcium channel blockers, beta blockers, angiotensin blockers and diuretics. The current treatment provides significant improvement. Compliance problems include exercise and diet.  Hypertensive end-organ damage includes CAD/MI. There is no history of angina or heart failure.   Palpitations    This is a chronic problem. The current episode started more than 1 year ago. The problem occurs every several days. The problem has been unchanged. The symptoms are aggravated by stress. Associated symptoms include anxiety, chest pain (sharp pinpoint pain ), malaise/fatigue and shortness of breath. Pertinent negatives include no coughing, diaphoresis, dizziness, fever, nausea, near-syncope, syncope, vomiting or weakness. Risk factors include diabetes mellitus and obesity. Her past medical history is significant for anxiety and heart disease.     Mrs. Tapia was recently hospitalized for an anterior cervical discectomy with fusion and during her hospital stay had complaints of chest pain.  Her chest pain was relieved with GI cocktail, EKG and cardiac enzymes obtained  "at the time of chest pain were both negative.  She takes multiple agents for hypertension.  She was seen during her hospitalization by Dr. Dunbar, however, follows with Dr. Ocampo for PCP management.  It was advised that she follow up with her cardiologist, Dr. Singh, after discharge. She has not seen her PCP since discharge. She states that she has not had further chest pain like she had in the hospital. She notes that she does get a sharp pain in her chest with palpitations from time to time for seconds, less often for a minute or so.  These go away on their own. They \"scare\" her. She continues to take her beta blocker. She has had holter monitoring in the past for this. She is short of breath, however this is unchanged form her baseline. She has been limited in activity recently due to her neck surgery. She has been unable to wear her Bipap as she should, recently, due to her neck brace that she is required to wear. Her blood pressure has been stable, she states that it was much improved after her PCP increased her losartan from 50 to 100 mg daily.     The following portions of the patient's history were reviewed and updated as appropriate: allergies, current medications, past family history, past medical history, past social history and past surgical history.     Allergies   Allergen Reactions   • Azelastine-Fluticasone Itching   • Contrast Dye Nausea And Vomiting   • Iodides Nausea And Vomiting   • Metformin And Related Itching   • Oxycodone-Aspirin Nausea And Vomiting   • Nisoldipine Er Rash   • Pregabalin Rash   • Sulfa Antibiotics Rash     Current outpatient and discharge medications have been reconciled for the patient.  Reviewed by: ELTON Vang      Current Outpatient Medications:   •  albuterol (PROVENTIL HFA;VENTOLIN HFA) 108 (90 BASE) MCG/ACT inhaler, 2 puffs 4 (Four) Times a Day., Disp: , Rfl:   •  amLODIPine (NORVASC) 10 MG tablet, Take 10 mg by mouth Daily., Disp: , Rfl:   •  " carboxymethylcellulose (REFRESH PLUS) 0.5 % solution, 3 (Three) Times a Day As Needed for Dry Eyes., Disp: , Rfl:   •  diazePAM (VALIUM) 5 MG tablet, Take 5 mg by mouth 3 (Three) Times a Day., Disp: , Rfl:   •  DULoxetine (CYMBALTA) 60 MG capsule, Take 60 mg by mouth 2 (Two) Times a Day., Disp: , Rfl:   •  fexofenadine (ALLEGRA) 180 MG tablet, Take 180 mg by mouth daily, Disp: , Rfl:   •  glipiZIDE (GLUCOTROL) 10 MG tablet, Take 10 mg by mouth 2 (Two) Times a Day Before Meals., Disp: , Rfl:   •  hydrochlorothiazide (MICROZIDE) 12.5 MG capsule, Take 1 capsule by mouth Daily As Needed (for edema). (Patient taking differently: Take 25 mg by mouth Daily As Needed (for edema).), Disp: 90 capsule, Rfl: 3  •  HYDROcodone-acetaminophen (NORCO)  MG per tablet, Take 1 tablet by mouth Every 6 (Six) Hours As Needed for Moderate Pain ., Disp: , Rfl:   •  insulin glargine (LANTUS) 100 UNIT/ML injection, Inject 55 Units under the skin into the appropriate area as directed 2 (Two) Times a Day., Disp: , Rfl:   •  insulin lispro (humaLOG) 100 UNIT/ML injection, Inject  under the skin into the appropriate area as directed 3 (Three) Times a Day Before Meals., Disp: , Rfl:   •  ketoconazole (NIZORAL) 200 MG tablet, Daily As Needed., Disp: , Rfl:   •  losartan (COZAAR) 50 MG tablet, Take 1 tablet by mouth Daily. (Patient taking differently: Take 100 mg by mouth Daily.), Disp: 90 tablet, Rfl: 3  •  meclizine (ANTIVERT) 12.5 MG tablet, Take 12.5 mg by mouth 3 (Three) Times a Day., Disp: , Rfl:   •  metoprolol tartrate (LOPRESSOR) 50 MG tablet, Take 1 tablet by mouth Every 12 (Twelve) Hours., Disp: 180 tablet, Rfl: 3  •  nystatin (MYCOSTATIN) 310548 UNIT/ML suspension, Take 500,000 Units by mouth As Needed., Disp: , Rfl:   •  simvastatin (ZOCOR) 10 MG tablet, Take 10 mg by mouth Every Night., Disp: , Rfl:   •  tiZANidine (ZANAFLEX) 4 MG tablet, 4 mg 3 (Three) Times a Day As Needed for Muscle Spasms., Disp: , Rfl:   •  traMADol  (ULTRAM) 50 MG tablet, Take 50 mg by mouth Every 6 (Six) Hours As Needed for Moderate Pain ., Disp: , Rfl:   •  fluticasone (FLONASE) 50 MCG/ACT nasal spray, 2 sprays into the nostril(s) as directed by provider Daily., Disp: , Rfl:   •  probiotic (CULTURELLE) capsule capsule, Take 1 capsule by mouth Daily., Disp: , Rfl:     Past Medical History:   Diagnosis Date   • Anxiety    • Anxiety    • Arthritis    • Asthma    • Back pain    • Cervicalgia 5/14/2020   • Chest pain    • Chronic fatigue    • Chronic kidney disease     Kidneys are only functioning 50 %   • Depression    • Diabetes (CMS/HCC)    • Diabetes mellitus (CMS/HCC)    • Dizzy    • Fibromyalgia    • Fibromyalgia    • Hiatal hernia    • Hypertension    • Infection of skin and subcutaneous tissue due to fungus    • Knee contracture     total   • Palpitations    • Panic attacks    • PONV (postoperative nausea and vomiting)    • Sleep apnea    • Sleep apnea     BiPAP   • Vertigo      Family History   Problem Relation Age of Onset   • Heart disease Mother    • Obesity Mother    • Diabetes Mother    • Hypertension Mother    • Stroke Mother    • Heart disease Father    • Stroke Brother    • Heart disease Brother    • Colon cancer Neg Hx    • Colon polyps Neg Hx      Social History     Socioeconomic History   • Marital status:      Spouse name: Not on file   • Number of children: Not on file   • Years of education: Not on file   • Highest education level: Not on file   Tobacco Use   • Smoking status: Never Smoker   • Smokeless tobacco: Never Used   Substance and Sexual Activity   • Alcohol use: No   • Drug use: No   • Sexual activity: Defer     Past Surgical History:   Procedure Laterality Date   • ANKLE FUSION Left    • ANKLE SURGERY Left     multiple surgeries   • CARDIAC CATHETERIZATION Left 4/19/2017    Procedure: Cardiac Catheterization/Vascular Study;  Surgeon: Shane Singh MD;  Location:  [facility] CATH INVASIVE LOCATION;  Service:    • CARDIAC  CATHETERIZATION N/A 2017    Procedure: Coronary angiography;  Surgeon: Shane Singh MD;  Location: [facility] CATH INVASIVE LOCATION;  Service:    • CARDIAC CATHETERIZATION N/A 2017    Procedure: Left Heart Cath;  Surgeon: Shane Singh MD;  Location: [facility] CATH INVASIVE LOCATION;  Service:    • CERVICAL FUSION      STITCHED UP TO PREVENT LOSING BABY   • CERVIX LESION DESTRUCTION     •  SECTION     • COLONOSCOPY     • COLONOSCOPY N/A 2017    Procedure: COLONOSCOPY WITH ANESTHESIA;  Surgeon: Raffaele Buchanan MD;  Location: [facility] ENDOSCOPY;  Service:    • ELBOW PROCEDURE Left     screws placed x 2   • ENDOSCOPY N/A 2017    Procedure: ESOPHAGOGASTRODUODENOSCOPY WITH ANESTHESIA;  Surgeon: Raffaele Buchanan MD;  Location: [facility] ENDOSCOPY;  Service:    • EXCISION MASS TRUNK      LEFT SIDE CAT SCRATCH FEVER    • EYE SURGERY      cataract removal sam   • REPLACEMENT TOTAL KNEE BILATERAL Bilateral    • ROTATOR CUFF REPAIR     • SINUS SURGERY     • SUBTOTAL HYSTERECTOMY      ovaries still intact   • TENNIS ELBOW RELEASE Right      Review of Systems   Constitution: Positive for malaise/fatigue. Negative for diaphoresis and fever.   Cardiovascular: Positive for chest pain (sharp pinpoint pain ) and palpitations. Negative for dyspnea on exertion, leg swelling, near-syncope, orthopnea, paroxysmal nocturnal dyspnea and syncope.   Respiratory: Positive for shortness of breath. Negative for cough and wheezing.    Hematologic/Lymphatic: Negative for bleeding problem.   Musculoskeletal: Positive for neck pain.   Gastrointestinal: Negative for bloating, abdominal pain, nausea and vomiting.   Neurological: Negative for dizziness, light-headedness, loss of balance and weakness.   Psychiatric/Behavioral: Negative for altered mental status. The patient is nervous/anxious.          ECG 12 Lead  Date/Time: 2020 12:20 PM  Performed by: Ericka Morejon APRN  Authorized by: Ericka Morejon APRN   Comparison:  "compared with previous ECG from 5/14/2020  Similar to previous ECG  Rhythm: sinus rhythm  Rate: normal  BPM: 67  Conduction: conduction normal  ST Segments: ST segments normal  T Waves: T waves normal  QRS axis: normal  Other findings: poor R wave progression    Clinical impression: abnormal EKG          /82   Pulse 67   Ht 170.2 cm (67\")   Wt 123 kg (271 lb)   LMP  (LMP Unknown)   SpO2 98%   BMI 42.44 kg/m²        Objective:     Physical Exam   Constitutional: She is oriented to person, place, and time. She appears well-developed and well-nourished.   HENT:   Head: Normocephalic and atraumatic.   Eyes: Conjunctivae are normal.   Neck: Neck supple.   Cardiovascular: Normal rate, regular rhythm and normal heart sounds.   Pulmonary/Chest: Effort normal. No respiratory distress. She has no wheezes. She has no rales.   Abdominal: Normal appearance and bowel sounds are normal.   Musculoskeletal: She exhibits no edema.   Neurological: She is alert and oriented to person, place, and time.   Skin: Skin is warm and dry. No erythema.   Psychiatric: She has a normal mood and affect. Her behavior is normal.   Vitals reviewed.      Lab Review:   Cardiac catheterization in April 2017 per Dr. Singh revealed nonobstructive coronary artery disease with 40 to 50% mid LAD stenosis noted.  Her EF was normal 55%.      Assessment:          Diagnosis Plan   1. Other chest pain     2. Nonobstructive atherosclerosis of coronary artery     3. Palpitation     4. Essential hypertension     5. Mixed hyperlipidemia     6. Sleep apnea, unspecified type     7. Morbid obesity due to excess calories (CMS/HCC)     8. Gastroesophageal reflux disease without esophagitis     9. Type 2 diabetes mellitus with hyperglycemia, with long-term current use of insulin (CMS/[facility])            Plan:       - chest pain: previous chest pain resolved.  Resolved in the hospital with administration of GI cocktail.  The only chest discomfort she described now is " a sharp chest pain that she gets only when she has palpitations.  She also notes that she has a history of requiring esophageal dilation.    -Nonobstructive coronary artery disease: Patient had cardiac work-up in 2017 with cardiac catheterization.  There was nonobstructive disease noted in her mid LAD estimated to be 40 to 50%.  Continue medical management.  -Palpitations: Continue beta-blocker.  If palpitations increase in frequency or duration could consider increasing beta-blocker dose.  Compliance with BiPAP.    -Hypertension: Most recent medication changes came from her primary care physician where her losartan was increased from 50 mg to 100 mg daily.  Her blood pressure is well controlled today and she states that since the medication change her blood pressure has been within normal limits.  She is on multiple other agents for blood pressure control also managed by her PCP.    -Hyperlipidemia: On statin therapy.    -Sleep apnea: Managed with BiPAP although she has not been able to wear this adequately due to her neck brace.    -Morbid obesity: Advised weight loss.  Encouraged compliance with diet and exercise.    -GERD: She states that she is not currently taking anything for her acid reflux.  I suggested that she talk to her primary care physician about something for her GERD long-term.  She was given a GI cocktail during her hospitalization after complaints of chest pain and reportedly had resolution of chest pain after drinking this.    -Diabetes mellitus: She states that her blood sugars have been all over the place recently after surgery.  Even reports that during her hospitalization they were higher than they usually run.  Advised to discuss with her PCP for management.    Follow-up: 6 months

## 2020-05-26 NOTE — PATIENT INSTRUCTIONS
"BMI for Adults    Body mass index (BMI) is a number that is calculated from a person's weight and height. BMI may help to estimate how much of a person's weight is composed of fat. BMI can help identify those who may be at higher risk for certain medical problems.  How is BMI used with adults?  BMI is used as a screening tool to identify possible weight problems. It is used to check whether a person is obese, overweight, healthy weight, or underweight.  How is BMI calculated?  BMI measures your weight and compares it to your height. This can be done either in English ([place] or metric measurements. Note that charts are available to help you find your BMI quickly and easily without having to do these calculations yourself.  To calculate your BMI in English ([place] measurements, your health care provider will:  1. Measure your weight in pounds (lb).  2. Multiply the number of pounds by 703.  ? For example, for a person who weighs 180 lb, multiply that number by 703, which equals 126,540.  3. Measure your height in inches (in). Then multiply that number by itself to get a measurement called \"inches squared.\"  ? For example, for a person who is 70 in tall, the \"inches squared\" measurement is 70 in x 70 in, which equals 4900 inches squared.  4. Divide the total from Step 2 (number of lb x 703) by the total from Step 3 (inches squared): 126,540 ÷ 4900 = 25.8. This is your BMI.  To calculate your BMI in metric measurements, your health care provider will:  1. Measure your weight in kilograms (kg).  2. Measure your height in meters (m). Then multiply that number by itself to get a measurement called \"meters squared.\"  ? For example, for a person who is 1.75 m tall, the \"meters squared\" measurement is 1.75 m x 1.75 m, which is equal to 3.1 meters squared.  3. Divide the number of kilograms (your weight) by the meters squared number. In this example: 70 ÷ 3.1 = 22.6. This is your BMI.  How is BMI interpreted?  To interpret your " results, your health care provider will use BMI charts to identify whether you are underweight, normal weight, overweight, or obese. The following guidelines will be used:  · Underweight: BMI less than 18.5.  · Normal weight: BMI between 18.5 and 24.9.  · Overweight: BMI between 25 and 29.9.  · Obese: BMI of 30 and above.  Please note:  · Weight includes both fat and muscle, so someone with a muscular build, such as an athlete, may have a BMI that is higher than 24.9. In cases like these, BMI is not an accurate measure of body fat.  · To determine if excess body fat is the cause of a BMI of 25 or higher, further assessments may need to be done by a health care provider.  · BMI is usually interpreted in the same way for men and women.  Why is BMI a useful tool?  BMI is useful in two ways:  · Identifying a weight problem that may be related to a medical condition, or that may increase the risk for medical problems.  · Promoting lifestyle and diet changes in order to reach a healthy weight.  Summary  · Body mass index (BMI) is a number that is calculated from a person's weight and height.  · BMI may help to estimate how much of a person's weight is composed of fat. BMI can help identify those who may be at higher risk for certain medical problems.  · BMI can be measured using English measurements or metric measurements.  · To interpret your results, your health care provider will use BMI charts to identify whether you are underweight, normal weight, overweight, or obese.  This information is not intended to replace advice given to you by your health care provider. Make sure you discuss any questions you have with your health care provider.  Document Released: 08/29/2005 Document Revised: 11/30/2018 Document Reviewed: 10/31/2018  [employer] Patient Education © 2020 [employer]  Hypertension, Adult  Hypertension is another name for high blood pressure. High blood pressure forces your heart to work harder to pump blood.  This can cause problems over time.  There are two numbers in a blood pressure reading. There is a top number (systolic) over a bottom number (diastolic). It is best to have a blood pressure that is below 120/80. Healthy choices can help lower your blood pressure, or you may need medicine to help lower it.  What are the causes?  The cause of this condition is not known. Some conditions may be related to high blood pressure.  What increases the risk?  · Smoking.  · Having type 2 diabetes mellitus, high cholesterol, or both.  · Not getting enough exercise or physical activity.  · Being overweight.  · Having too much fat, sugar, calories, or salt (sodium) in your diet.  · Drinking too much alcohol.  · Having long-term (chronic) kidney disease.  · Having a family history of high blood pressure.  · Age. Risk increases with age.  · Race. You may be at higher risk if you are .  · Gender. Men are at higher risk than women before age 45. After age 65, women are at higher risk than men.  · Having obstructive sleep apnea.  · Stress.  What are the signs or symptoms?  · High blood pressure may not cause symptoms. Very high blood pressure (hypertensive crisis) may cause:  ? Headache.  ? Feelings of worry or nervousness (anxiety).  ? Shortness of breath.  ? Nosebleed.  ? A feeling of being sick to your stomach (nausea).  ? Throwing up (vomiting).  ? Changes in how you see.  ? Very bad chest pain.  ? Seizures.  How is this treated?  · This condition is treated by making healthy lifestyle changes, such as:  ? Eating healthy foods.  ? Exercising more.  ? Drinking less alcohol.  · Your health care provider may prescribe medicine if lifestyle changes are not enough to get your blood pressure under control, and if:  ? Your top number is above 130.  ? Your bottom number is above 80.  · Your personal target blood pressure may vary.  Follow these instructions at home:  Eating and drinking    · If told, follow the DASH eating  plan. To follow this plan:  ? Fill one half of your plate at each meal with fruits and vegetables.  ? Fill one fourth of your plate at each meal with whole grains. Whole grains include whole-wheat pasta, brown rice, and whole-grain bread.  ? Eat or drink low-fat dairy products, such as skim milk or low-fat yogurt.  ? Fill one fourth of your plate at each meal with low-fat (lean) proteins. Low-fat proteins include fish, chicken without skin, eggs, beans, and tofu.  ? Avoid fatty meat, cured and processed meat, or chicken with skin.  ? Avoid pre-made or processed food.  · Eat less than 1,500 mg of salt each day.  · Do not drink alcohol if:  ? Your doctor tells you not to drink.  ? You are pregnant, may be pregnant, or are planning to become pregnant.  · If you drink alcohol:  ? Limit how much you use to:  § 0-1 drink a day for women.  § 0-2 drinks a day for men.  ? Be aware of how much alcohol is in your drink. In the [place] one drink equals one 12 oz bottle of beer (355 mL), one 5 oz glass of wine (148 mL), or one 1½ oz glass of hard liquor (44 mL).  Lifestyle    · Work with your doctor to stay at a healthy weight or to lose weight. Ask your doctor what the best weight is for you.  · Get at least 30 minutes of exercise most days of the week. This may include walking, swimming, or biking.  · Get at least 30 minutes of exercise that strengthens your muscles (resistance exercise) at least 3 days a week. This may include lifting weights or doing Pilates.  · Do not use any products that contain nicotine or tobacco, such as cigarettes, e-cigarettes, and chewing tobacco. If you need help quitting, ask your doctor.  · Check your blood pressure at home as told by your doctor.  · Keep all follow-up visits as told by your doctor. This is important.  Medicines  · Take over-the-counter and prescription medicines only as told by your doctor. Follow directions carefully.  · Do not skip doses of blood pressure medicine. The medicine  does not work as well if you skip doses. Skipping doses also puts you at risk for problems.  · Ask your doctor about side effects or reactions to medicines that you should watch for.  Contact a doctor if you:  · Think you are having a reaction to the medicine you are taking.  · Have headaches that keep coming back (recurring).  · Feel dizzy.  · Have swelling in your ankles.  · Have trouble with your vision.  Get help right away if you:  · Get a very bad headache.  · Start to feel mixed up (confused).  · Feel weak or numb.  · Feel faint.  · Have very bad pain in your:  ? Chest.  ? Belly (abdomen).  · Throw up more than once.  · Have trouble breathing.  Summary  · Hypertension is another name for high blood pressure.  · High blood pressure forces your heart to work harder to pump blood.  · For most people, a normal blood pressure is less than 120/80.  · Making healthy choices can help lower blood pressure. If your blood pressure does not get lower with healthy choices, you may need to take medicine.  This information is not intended to replace advice given to you by your health care provider. Make sure you discuss any questions you have with your health care provider.  Document Released: 06/05/2009 Document Revised: 08/28/2019 Document Reviewed: 08/28/2019  [employer] Patient Education © 2020 [employer]

## 2020-11-30 ENCOUNTER — OFFICE VISIT (OUTPATIENT)
Dept: CARDIOLOGY | Facility: CLINIC | Age: 60
End: 2020-11-30

## 2020-11-30 VITALS
DIASTOLIC BLOOD PRESSURE: 80 MMHG | OXYGEN SATURATION: 100 % | HEIGHT: 67 IN | HEART RATE: 90 BPM | SYSTOLIC BLOOD PRESSURE: 122 MMHG | BODY MASS INDEX: 43.95 KG/M2 | WEIGHT: 280 LBS

## 2020-11-30 DIAGNOSIS — E11.65 TYPE 2 DIABETES MELLITUS WITH HYPERGLYCEMIA, WITH LONG-TERM CURRENT USE OF INSULIN (HCC): ICD-10-CM

## 2020-11-30 DIAGNOSIS — Z79.4 TYPE 2 DIABETES MELLITUS WITH HYPERGLYCEMIA, WITH LONG-TERM CURRENT USE OF INSULIN (HCC): ICD-10-CM

## 2020-11-30 DIAGNOSIS — R00.2 PALPITATION: ICD-10-CM

## 2020-11-30 DIAGNOSIS — G47.30 SLEEP APNEA, UNSPECIFIED TYPE: ICD-10-CM

## 2020-11-30 DIAGNOSIS — R00.2 PALPITATIONS: Primary | ICD-10-CM

## 2020-11-30 DIAGNOSIS — I95.1 AUTONOMIC ORTHOSTATIC HYPOTENSION: ICD-10-CM

## 2020-11-30 DIAGNOSIS — R06.09 DOE (DYSPNEA ON EXERTION): ICD-10-CM

## 2020-11-30 DIAGNOSIS — I25.10 NONOBSTRUCTIVE ATHEROSCLEROSIS OF CORONARY ARTERY: ICD-10-CM

## 2020-11-30 DIAGNOSIS — E66.01 MORBID OBESITY DUE TO EXCESS CALORIES (HCC): ICD-10-CM

## 2020-11-30 PROCEDURE — 99214 OFFICE O/P EST MOD 30 MIN: CPT | Performed by: INTERNAL MEDICINE

## 2020-11-30 PROCEDURE — 93000 ELECTROCARDIOGRAM COMPLETE: CPT | Performed by: INTERNAL MEDICINE

## 2020-11-30 RX ORDER — METOPROLOL TARTRATE 50 MG/1
75 TABLET, FILM COATED ORAL EVERY 12 HOURS SCHEDULED
Qty: 270 TABLET | Refills: 3 | Status: SHIPPED | OUTPATIENT
Start: 2020-11-30 | End: 2021-03-04 | Stop reason: SDUPTHER

## 2020-11-30 NOTE — PROGRESS NOTES
Barbara Tapia  [id number]  1960  60 y.o.  female    Referring Provider: Austin Ocampo MD    Reason for  Visit:  Routine follow up.  Chest pain    Shortness of breath and palpitations  Now sees Dr Ocampo now as Dr Bartlett has retired    Subjective    Increasing exertional shortness of breath on exertion relieved with rest  No significant cough or wheezing    Increasing palpitations   Intermittent palpitations, once every several days to several weeks lasting for less than 1 minute to up to 30 mins   Feels heart starts and stops  Has associated symptoms of dizziness, weakness, chest pain, shortness of breath    Occurs several times a day when she gets this but can happens days apart  No significant pedal edema    No fever or chills  No significant expectoration    No hemoptysis  No presyncope or syncope     Atypical chest pain   Non exertional chest pain     Has seen Dr Conrad for Gastric sleeve, decided against this  Intentional weight loss of 7 lbs per last visit per patient at home in past, now she is upset she has minaya all this weight back  Cardiac cath non obstructive coronary artery disease      Blood sugars not well controlled at home, goes up and down   BP well controlled at home.          History of present illness:  Barbara Tapia is a 60 y.o. yo female with history of Essential Hypertension, Type 2 diabetes mellitus  Morbid obesity     who presents today for   Chief Complaint   Patient presents with   • Palpitations     6mo- patient states she is still having PALP. last about 30 mins. 3-4x a week.  some CP sharp and takes her breath away. states he feels like she going to pass out. lots of headaches and tired. patient is monitoring her BP and weight at home. weekly    • Hypertension   .    History  Past Medical History:   Diagnosis Date   • Anxiety    • Anxiety    • Arthritis    • Asthma    • Back pain    • Cervicalgia 5/14/2020   • Chest pain    • Chronic fatigue    • Chronic kidney disease      Kidneys are only functioning 50 %   • Depression    • Diabetes (CMS/HCC)    • Diabetes mellitus (CMS/HCC)    • Dizzy    • Fibromyalgia    • Fibromyalgia    • Hiatal hernia    • Hypertension    • Infection of skin and subcutaneous tissue due to fungus    • Knee contracture     total   • Palpitations    • Panic attacks    • PONV (postoperative nausea and vomiting)    • Sleep apnea    • Sleep apnea     BiPAP   • Vertigo    ,   Past Surgical History:   Procedure Laterality Date   • ANKLE FUSION Left    • ANKLE SURGERY Left     multiple surgeries   • ANTERIOR CERVICAL DISCECTOMY W/ FUSION N/A 2020    Procedure: ANTERIOR CERVICAL DISCECTOMY FUSION C5-7;  Surgeon: FLO Blanchard MD;  Location: [facility] OR;  Service: Orthopedic Spine;  Laterality: N/A;   • CARDIAC CATHETERIZATION Left 2017    Procedure: Cardiac Catheterization/Vascular Study;  Surgeon: Shane Singh MD;  Location: [facility] CATH INVASIVE LOCATION;  Service:    • CARDIAC CATHETERIZATION N/A 2017    Procedure: Coronary angiography;  Surgeon: Shane Singh MD;  Location: [facility] CATH INVASIVE LOCATION;  Service:    • CARDIAC CATHETERIZATION N/A 2017    Procedure: Left Heart Cath;  Surgeon: Shane Singh MD;  Location:  [facility] CATH INVASIVE LOCATION;  Service:    • CERVICAL FUSION      STITCHED UP TO PREVENT LOSING BABY   • CERVIX LESION DESTRUCTION     •  SECTION     • COLONOSCOPY     • COLONOSCOPY N/A 2017    Procedure: COLONOSCOPY WITH ANESTHESIA;  Surgeon: Raffaele Buchanan MD;  Location: [facility] ENDOSCOPY;  Service:    • ELBOW PROCEDURE Left     screws placed x 2   • ENDOSCOPY N/A 2017    Procedure: ESOPHAGOGASTRODUODENOSCOPY WITH ANESTHESIA;  Surgeon: Raffaele Buchanan MD;  Location: [facility] ENDOSCOPY;  Service:    • EXCISION MASS TRUNK      LEFT SIDE CAT SCRATCH FEVER    • EYE SURGERY      cataract removal sam   • REPLACEMENT TOTAL KNEE BILATERAL Bilateral    • ROTATOR CUFF REPAIR     • SINUS SURGERY     • SUBTOTAL  HYSTERECTOMY      ovaries still intact   • TENNIS ELBOW RELEASE Right    ,   Family History   Problem Relation Age of Onset   • Heart disease Mother    • Obesity Mother    • Diabetes Mother    • Hypertension Mother    • Stroke Mother    • Heart disease Father    • Stroke Brother    • Heart disease Brother    • Colon cancer Neg Hx    • Colon polyps Neg Hx    ,   Social History     Tobacco Use   • Smoking status: Never Smoker   • Smokeless tobacco: Never Used   Substance Use Topics   • Alcohol use: No   • Drug use: No   ,     Medications  Current Outpatient Medications   Medication Sig Dispense Refill   • albuterol (PROVENTIL HFA;VENTOLIN HFA) 108 (90 BASE) MCG/ACT inhaler 2 puffs 4 (Four) Times a Day.     • amLODIPine (NORVASC) 10 MG tablet Take 10 mg by mouth Daily.     • carboxymethylcellulose (REFRESH PLUS) 0.5 % solution 3 (Three) Times a Day As Needed for Dry Eyes.     • diazePAM (VALIUM) 5 MG tablet Take 5 mg by mouth 3 (Three) Times a Day.     • DULoxetine (CYMBALTA) 60 MG capsule Take 60 mg by mouth 2 (Two) Times a Day.     • fexofenadine (ALLEGRA) 180 MG tablet Take 180 mg by mouth daily     • fluticasone (FLONASE) 50 MCG/ACT nasal spray 2 sprays into the nostril(s) as directed by provider Daily.     • glipiZIDE (GLUCOTROL) 10 MG tablet Take 10 mg by mouth 2 (Two) Times a Day Before Meals.     • hydrochlorothiazide (MICROZIDE) 12.5 MG capsule Take 1 capsule by mouth Daily As Needed (for edema). (Patient taking differently: Take 25 mg by mouth Daily As Needed (for edema).) 90 capsule 3   • insulin glargine (LANTUS) 100 UNIT/ML injection Inject 55 Units under the skin into the appropriate area as directed 2 (Two) Times a Day.     • insulin lispro (humaLOG) 100 UNIT/ML injection Inject  under the skin into the appropriate area as directed 3 (Three) Times a Day Before Meals.     • ketoconazole (NIZORAL) 200 MG tablet Daily As Needed.     • losartan (COZAAR) 50 MG tablet Take 1 tablet by mouth Daily. (Patient  "taking differently: Take 100 mg by mouth Daily.) 90 tablet 3   • meclizine (ANTIVERT) 12.5 MG tablet Take 12.5 mg by mouth 3 (Three) Times a Day.     • metoprolol tartrate (LOPRESSOR) 50 MG tablet Take 1.5 tablets by mouth Every 12 (Twelve) Hours. 270 tablet 3   • nystatin (MYCOSTATIN) 410086 UNIT/ML suspension Take 500,000 Units by mouth As Needed.     • simvastatin (ZOCOR) 10 MG tablet Take 10 mg by mouth Every Night.     • tiZANidine (ZANAFLEX) 4 MG tablet 4 mg 3 (Three) Times a Day As Needed for Muscle Spasms.     • traMADol (ULTRAM) 50 MG tablet Take 50 mg by mouth Every 6 (Six) Hours As Needed for Moderate Pain .     • HYDROcodone-acetaminophen (NORCO)  MG per tablet Take 1 tablet by mouth Every 6 (Six) Hours As Needed for Moderate Pain .     • probiotic (CULTURELLE) capsule capsule Take 1 capsule by mouth Daily.       No current facility-administered medications for this visit.        Allergies:  Azelastine-fluticasone, Contrast dye, Iodides, Metformin and related, Oxycodone-aspirin, Nisoldipine er, Pregabalin, and Sulfa antibiotics    Review of Systems  Review of Systems   Constitution: Positive for malaise/fatigue.   HENT: Negative.    Eyes: Negative.    Cardiovascular: Positive for chest pain, dyspnea on exertion, leg swelling and palpitations. Negative for claudication, cyanosis, irregular heartbeat, near-syncope, orthopnea, paroxysmal nocturnal dyspnea and syncope.   Respiratory: Negative.    Endocrine: Negative.    Hematologic/Lymphatic: Negative.    Skin: Negative.    Musculoskeletal: Positive for arthritis and back pain.   Gastrointestinal: Positive for dysphagia and heartburn. Negative for anorexia.   Genitourinary: Negative.    Neurological: Positive for dizziness and weakness.   Psychiatric/Behavioral: Negative.        Objective     Physical Exam:  /80   Pulse 90   Ht 168.9 cm (66.5\")   Wt 127 kg (280 lb)   LMP  (LMP Unknown)   SpO2 100%   BMI 44.52 kg/m²   Physical Exam "   Constitutional: She appears well-developed.   HENT:   Head: Normocephalic.   Neck: Normal carotid pulses and no JVD present. No tracheal tenderness present. Carotid bruit is not present. No tracheal deviation and no edema present.   Cardiovascular: Regular rhythm, normal heart sounds and normal pulses.   Pulmonary/Chest: Effort normal. No stridor.   Abdominal: Soft. She exhibits no distension. There is no abdominal tenderness.     Vascular Status -  Her right foot exhibits abnormal foot edema. Her left foot exhibits abnormal foot edema.  Neurological: She is alert. No cranial nerve deficit or sensory deficit.   Skin: Skin is warm.   Psychiatric: Her speech is normal and behavior is normal.       Results Review:  Zio patch     · Average HR: 64. Min HR: 45. Max HR: 143.     · The predominant rhythm noted during the testing period was sinus rhythm.  · Premature atrial and ventricular contractions occured as below.  · Total ectopy burden during the monitoring period; PVCs:  <1% and APCs: <1%                   · No significant supraventricular or ventricular tachy or kaitlynn arrhythmia.  · 28    supraventricular tachycardia episodes fastest 5    beats at maximum rate of    143    BPM   · No correlated arrhythmia  · No significant pauses  · Entire report was reviewed        Conclusion  2017  Non obstructive  coronary artery disease with dominant RCA  Normal LVEF  LVEDP   29    mm Hg     Plan     Hydration overnight   Nephrology consult  Observation  Risk factor modifications  Workup for non cardiac causes of chest pain          ECG 12 Lead    Date/Time: 11/30/2020 10:57 AM  Performed by: Shane Singh MD  Authorized by: Shane Singh MD   Comparison: compared with previous ECG from 5/26/2020  Comparison to previous ECG: Septal Q waves  Rhythm: sinus rhythm  Rate: normal  Q waves: V1 and V2    QRS axis: normal    Clinical impression: abnormal  EKG          ____________________________________________________________________________________________________________________________________________  Health maintenance and recommendations  Similar recommendations as last visit     Offered to give patient  a copy      Questions were encouraged, asked and answered to the patient's  understanding and satisfaction. Questions if any regarding current medications and side effects, need for refills and importance of compliance to medications stressed.    Reviewed available prior notes, consults, prior visits, laboratory findings, radiology and cardiology relevant reports. Updated chart as applicable. I have reviewed the patient's medical history in detail and updated the computerized patient record as relevant.      Updated patient regarding any new or relevant abnormalities on review of records or any new findings on physical exam. Mentioned to patient about purpose of visit and desirable health short and long term goals and objectives.    Primary to monitor CBC CMP Lipid panel and TSH as applicable    ___________________________________________________________________________________________________________________________________________        Assessment/Plan   Patient Active Problem List   Diagnosis   • Palpitation   • Morbid obesity due to excess calories (CMS/HCC)   • BLADIMIR treated with BiPAP   • Other chest pain   • Essential hypertension   • Type 2 diabetes mellitus with hyperglycemia, with long-term current use of insulin (CMS/HCC)   • Mixed hyperlipidemia   • Anxiety and depression   • Other dysphagia   • Gastroesophageal reflux disease without esophagitis   • Encounter for screening for malignant neoplasm of colon   • Esophageal dysphagia   • Nonsmoker   • Obesity, unspecified obesity severity, unspecified obesity type   • Slow transit constipation   • Sleep apnea   • Obesity, Class III, BMI 40-49.9 (morbid obesity) (CMS/[facility])   • Autonomic orthostatic  hypotension   • Cervicalgia   • Cervical radiculopathy due to degenerative joint disease of spine   • Nonobstructive atherosclerosis of coronary artery     Plan         Orders Placed This Encounter   Procedures   • Holter Monitor - 72 Hour Up To 21 Days     Standing Status:   Future     Standing Expiration Date:   11/30/2021     Order Specific Question:   Reason for exam?     Answer:   Palpitations   • ECG 12 Lead     This order was created via procedure documentation   • Adult Transthoracic Echo Complete W/ Cont if Necessary Per Protocol     Myocardial strain to be performed during echocardiogram as long as technically feasible     Standing Status:   Future     Standing Expiration Date:   11/30/2021     Order Specific Question:   Reason for exam?     Answer:   Dyspnea      Increase beta blocker therapy   Requested Prescriptions     Signed Prescriptions Disp Refills   • metoprolol tartrate (LOPRESSOR) 50 MG tablet 270 tablet 3     Sig: Take 1.5 tablets by mouth Every 12 (Twelve) Hours.      Bring copies of labs and other test from primary care provider next visit   Especially TSH, lipids, A1c        Check BP and heart rates twice daily at least 3x / week at home and bring a recording for me to review next visit  If BP >140/90 or < 100/60 persistently over 3 reading 30 mins apart call sooner     Continue CPAP      Keep A1c less than 7 Primary to monitor  Keep LDL below 70 mg/dl. Monitor liver and renal functions.   Monitor CBC, CMP, TSH (as indicated) and Lipid Panel by primary        Return in about 6 weeks (around 1/11/2021).

## 2020-12-28 ENCOUNTER — HOSPITAL ENCOUNTER (OUTPATIENT)
Dept: CARDIOLOGY | Facility: HOSPITAL | Age: 60
Discharge: HOME | End: 2020-12-28
Admitting: INTERNAL MEDICINE

## 2020-12-28 VITALS
SYSTOLIC BLOOD PRESSURE: 141 MMHG | DIASTOLIC BLOOD PRESSURE: 58 MMHG | WEIGHT: 279.98 LBS | BODY MASS INDEX: 45 KG/M2 | HEIGHT: 66 IN

## 2020-12-28 DIAGNOSIS — R06.09 DOE (DYSPNEA ON EXERTION): ICD-10-CM

## 2020-12-28 PROCEDURE — 93356 MYOCRD STRAIN IMG SPCKL TRCK: CPT

## 2020-12-28 PROCEDURE — 93306 TTE W/DOPPLER COMPLETE: CPT | Performed by: INTERNAL MEDICINE

## 2020-12-28 PROCEDURE — 93306 TTE W/DOPPLER COMPLETE: CPT

## 2020-12-28 PROCEDURE — 93356 MYOCRD STRAIN IMG SPCKL TRCK: CPT | Performed by: INTERNAL MEDICINE

## 2020-12-29 LAB
BH CV ECHO MEAS - AO MAX PG (FULL): 4.3 MMHG
BH CV ECHO MEAS - AO MAX PG: 8.1 MMHG
BH CV ECHO MEAS - AO MEAN PG (FULL): 2 MMHG
BH CV ECHO MEAS - AO MEAN PG: 4 MMHG
BH CV ECHO MEAS - AO ROOT AREA (BSA CORRECTED): 1.2
BH CV ECHO MEAS - AO ROOT AREA: 6.2 CM^2
BH CV ECHO MEAS - AO ROOT DIAM: 2.8 CM
BH CV ECHO MEAS - AO V2 MAX: 142 CM/SEC
BH CV ECHO MEAS - AO V2 MEAN: 95.6 CM/SEC
BH CV ECHO MEAS - AO V2 VTI: 32.5 CM
BH CV ECHO MEAS - AVA(I,A): 2.4 CM^2
BH CV ECHO MEAS - AVA(I,D): 2.4 CM^2
BH CV ECHO MEAS - AVA(V,A): 2.2 CM^2
BH CV ECHO MEAS - AVA(V,D): 2.2 CM^2
BH CV ECHO MEAS - BSA(HAYCOCK): 2.5 M^2
BH CV ECHO MEAS - BSA: 2.3 M^2
BH CV ECHO MEAS - BZI_BMI: 43.9 KILOGRAMS/M^2
BH CV ECHO MEAS - BZI_METRIC_HEIGHT: 170.2 CM
BH CV ECHO MEAS - BZI_METRIC_WEIGHT: 127 KG
BH CV ECHO MEAS - EDV(CUBED): 76.2 ML
BH CV ECHO MEAS - EDV(MOD-SP4): 88.3 ML
BH CV ECHO MEAS - EDV(TEICH): 80.4 ML
BH CV ECHO MEAS - EF(CUBED): 61.7 %
BH CV ECHO MEAS - EF(MOD-SP4): 63.6 %
BH CV ECHO MEAS - EF(TEICH): 53.6 %
BH CV ECHO MEAS - ESV(CUBED): 29.2 ML
BH CV ECHO MEAS - ESV(MOD-SP4): 32.1 ML
BH CV ECHO MEAS - ESV(TEICH): 37.3 ML
BH CV ECHO MEAS - FS: 27.4 %
BH CV ECHO MEAS - IVS/LVPW: 1
BH CV ECHO MEAS - IVSD: 0.9 CM
BH CV ECHO MEAS - LA DIMENSION: 3.7 CM
BH CV ECHO MEAS - LA/AO: 1.3
BH CV ECHO MEAS - LAT PEAK E' VEL: 10.4 CM/SEC
BH CV ECHO MEAS - LV DIASTOLIC VOL/BSA (35-75): 37.8 ML/M^2
BH CV ECHO MEAS - LV MASS(C)D: 118.2 GRAMS
BH CV ECHO MEAS - LV MASS(C)DI: 50.7 GRAMS/M^2
BH CV ECHO MEAS - LV MAX PG: 3.8 MMHG
BH CV ECHO MEAS - LV MEAN PG: 2 MMHG
BH CV ECHO MEAS - LV SYSTOLIC VOL/BSA (12-30): 13.8 ML/M^2
BH CV ECHO MEAS - LV V1 MAX: 97.5 CM/SEC
BH CV ECHO MEAS - LV V1 MEAN: 66.2 CM/SEC
BH CV ECHO MEAS - LV V1 VTI: 25 CM
BH CV ECHO MEAS - LVIDD: 4.2 CM
BH CV ECHO MEAS - LVIDS: 3.1 CM
BH CV ECHO MEAS - LVLD AP4: 7.8 CM
BH CV ECHO MEAS - LVLS AP4: 6.2 CM
BH CV ECHO MEAS - LVOT AREA (M): 3.1 CM^2
BH CV ECHO MEAS - LVOT AREA: 3.1 CM^2
BH CV ECHO MEAS - LVOT DIAM: 2 CM
BH CV ECHO MEAS - LVPWD: 0.88 CM
BH CV ECHO MEAS - MED PEAK E' VEL: 5.87 CM/SEC
BH CV ECHO MEAS - MV A MAX VEL: 78.1 CM/SEC
BH CV ECHO MEAS - MV DEC TIME: 0.25 SEC
BH CV ECHO MEAS - MV E MAX VEL: 78.1 CM/SEC
BH CV ECHO MEAS - MV E/A: 1
BH CV ECHO MEAS - RAP SYSTOLE: 5 MMHG
BH CV ECHO MEAS - RVSP: 15.1 MMHG
BH CV ECHO MEAS - SI(AO): 85.8 ML/M^2
BH CV ECHO MEAS - SI(CUBED): 20.1 ML/M^2
BH CV ECHO MEAS - SI(LVOT): 33.7 ML/M^2
BH CV ECHO MEAS - SI(MOD-SP4): 24.1 ML/M^2
BH CV ECHO MEAS - SI(TEICH): 18.4 ML/M^2
BH CV ECHO MEAS - SV(AO): 200.1 ML
BH CV ECHO MEAS - SV(CUBED): 47 ML
BH CV ECHO MEAS - SV(LVOT): 78.5 ML
BH CV ECHO MEAS - SV(MOD-SP4): 56.2 ML
BH CV ECHO MEAS - SV(TEICH): 43 ML
BH CV ECHO MEAS - TR MAX VEL: 159 CM/SEC
BH CV ECHO MEASUREMENTS AVERAGE E/E' RATIO: 9.6
LEFT ATRIUM VOLUME INDEX: 18.9 ML/M2
LEFT ATRIUM VOLUME: 44 CM3
MAXIMAL PREDICTED HEART RATE: 158 BPM
STRESS TARGET HR: 134 BPM

## 2021-01-19 ENCOUNTER — OFFICE VISIT (OUTPATIENT)
Dept: CARDIOLOGY | Facility: CLINIC | Age: 61
End: 2021-01-19

## 2021-01-19 VITALS
SYSTOLIC BLOOD PRESSURE: 142 MMHG | BODY MASS INDEX: 45 KG/M2 | WEIGHT: 280 LBS | OXYGEN SATURATION: 97 % | HEIGHT: 66 IN | DIASTOLIC BLOOD PRESSURE: 86 MMHG | HEART RATE: 67 BPM

## 2021-01-19 DIAGNOSIS — F41.9 ANXIETY AND DEPRESSION: ICD-10-CM

## 2021-01-19 DIAGNOSIS — E66.01 MORBID OBESITY DUE TO EXCESS CALORIES (HCC): ICD-10-CM

## 2021-01-19 DIAGNOSIS — Z79.4 TYPE 2 DIABETES MELLITUS WITH HYPERGLYCEMIA, WITH LONG-TERM CURRENT USE OF INSULIN (HCC): ICD-10-CM

## 2021-01-19 DIAGNOSIS — F32.A ANXIETY AND DEPRESSION: ICD-10-CM

## 2021-01-19 DIAGNOSIS — R00.2 PALPITATION: Primary | ICD-10-CM

## 2021-01-19 DIAGNOSIS — E11.65 TYPE 2 DIABETES MELLITUS WITH HYPERGLYCEMIA, WITH LONG-TERM CURRENT USE OF INSULIN (HCC): ICD-10-CM

## 2021-01-19 DIAGNOSIS — I10 ESSENTIAL HYPERTENSION: ICD-10-CM

## 2021-01-19 DIAGNOSIS — G47.30 SLEEP APNEA, UNSPECIFIED TYPE: ICD-10-CM

## 2021-01-19 DIAGNOSIS — E78.2 MIXED HYPERLIPIDEMIA: ICD-10-CM

## 2021-01-19 DIAGNOSIS — R07.89 OTHER CHEST PAIN: ICD-10-CM

## 2021-01-19 DIAGNOSIS — M47.22 CERVICAL RADICULOPATHY DUE TO DEGENERATIVE JOINT DISEASE OF SPINE: ICD-10-CM

## 2021-01-19 PROCEDURE — 99214 OFFICE O/P EST MOD 30 MIN: CPT | Performed by: INTERNAL MEDICINE

## 2021-01-19 RX ORDER — CHOLECALCIFEROL (VITAMIN D3) 125 MCG
TABLET ORAL WEEKLY
COMMUNITY
End: 2021-05-17

## 2021-01-19 RX ORDER — LOSARTAN POTASSIUM 100 MG/1
100 TABLET ORAL DAILY
COMMUNITY
Start: 2020-12-15

## 2021-01-19 RX ORDER — FLECAINIDE ACETATE 50 MG/1
50 TABLET ORAL 2 TIMES DAILY
Qty: 60 TABLET | Refills: 11 | Status: SHIPPED | OUTPATIENT
Start: 2021-01-19 | End: 2021-12-17

## 2021-01-19 NOTE — PROGRESS NOTES
Barbara Tapia  [id number]  1960  60 y.o.  female    Referring Provider: Austin cOampo MD    Reason for  Visit:  Routine follow up.  Chest pain    Shortness of breath and palpitations  Now sees Dr Ocampo now as Dr Bartlett has retired  Cardiac workup test results as below : zio patch and echo     Subjective      Overall feels the same   No new events or complaints since last visit   Overall the patient feels no major change from baseline symptoms   Similar symptoms as during last visit     Tolerating current medications well with no untoward side effects   Compliant with prescribed medication regimen. Tries to adhere to cardiac diet.      After increasing beta blocker therapy still has palpitations   Moderate persistent exertional shortness of breath on exertion relieved with rest  No significant cough or wheezing    Moderate  palpitations   Intermittent palpitations, once every several days to several weeks lasting for less than 1 minute to up to 30 mins   Feels heart starts and stops  Has associated symptoms of dizziness, weakness, chest pain, shortness of breath    Occurs several times a day when she gets this but can happens days apart  No significant pedal edema    No fever or chills  No significant expectoration    No hemoptysis  No presyncope or syncope     Atypical chest pain   Chest pain at random and not necessarily related to exertion  Overall feels better and chest pain now improved   Non exertional chest pain   Cardiac cath non obstructive coronary artery disease      Blood sugars not well controlled at home, goes up and down   BP well controlled at home.          History of present illness:  Barbara Tapia is a 60 y.o. yo female with history of Essential Hypertension, Type 2 diabetes mellitus  Morbid obesity     who presents today for   Chief Complaint   Patient presents with   • Palpitations     6wk- ECHO RESULTS- some having some palpitations and a little bit of pain. SOB most of the time.     .    History  Past Medical History:   Diagnosis Date   • Anxiety    • Anxiety    • Arthritis    • Asthma    • Back pain    • Cervicalgia 2020   • Chest pain    • Chronic fatigue    • Chronic kidney disease     Kidneys are only functioning 50 %   • Depression    • Diabetes (CMS/HCC)    • Diabetes mellitus (CMS/HCC)    • Dizzy    • Fibromyalgia    • Fibromyalgia    • Hiatal hernia    • Hypertension    • Infection of skin and subcutaneous tissue due to fungus    • Knee contracture     total   • Palpitations    • Panic attacks    • PONV (postoperative nausea and vomiting)    • Sleep apnea    • Sleep apnea     BiPAP   • Vertigo    ,   Past Surgical History:   Procedure Laterality Date   • ANKLE FUSION Left    • ANKLE SURGERY Left     multiple surgeries   • ANTERIOR CERVICAL DISCECTOMY W/ FUSION N/A 2020    Procedure: ANTERIOR CERVICAL DISCECTOMY FUSION C5-7;  Surgeon: FLO Blanchard MD;  Location: [facility] OR;  Service: Orthopedic Spine;  Laterality: N/A;   • CARDIAC CATHETERIZATION Left 2017    Procedure: Cardiac Catheterization/Vascular Study;  Surgeon: Shane Singh MD;  Location: [facility] CATH INVASIVE LOCATION;  Service:    • CARDIAC CATHETERIZATION N/A 2017    Procedure: Coronary angiography;  Surgeon: Shane Singh MD;  Location: [facility] CATH INVASIVE LOCATION;  Service:    • CARDIAC CATHETERIZATION N/A 2017    Procedure: Left Heart Cath;  Surgeon: Shane Singh MD;  Location: [facility] CATH INVASIVE LOCATION;  Service:    • CERVICAL FUSION      STITCHED UP TO PREVENT LOSING BABY   • CERVIX LESION DESTRUCTION     •  SECTION     • COLONOSCOPY     • COLONOSCOPY N/A 2017    Procedure: COLONOSCOPY WITH ANESTHESIA;  Surgeon: Raffaele Buchanan MD;  Location: [facility] ENDOSCOPY;  Service:    • ELBOW PROCEDURE Left     screws placed x 2   • ENDOSCOPY N/A 2017    Procedure: ESOPHAGOGASTRODUODENOSCOPY WITH ANESTHESIA;  Surgeon: Raffaele Buchanan MD;  Location: [facility] ENDOSCOPY;  Service:     • EXCISION MASS TRUNK      LEFT SIDE CAT SCRATCH FEVER    • EYE SURGERY      cataract removal sam   • REPLACEMENT TOTAL KNEE BILATERAL Bilateral    • ROTATOR CUFF REPAIR     • SINUS SURGERY     • SUBTOTAL HYSTERECTOMY      ovaries still intact   • TENNIS ELBOW RELEASE Right    ,   Family History   Problem Relation Age of Onset   • Heart disease Mother    • Obesity Mother    • Diabetes Mother    • Hypertension Mother    • Stroke Mother    • Heart disease Father    • Stroke Brother    • Heart disease Brother    • Colon cancer Neg Hx    • Colon polyps Neg Hx    ,   Social History     Tobacco Use   • Smoking status: Never Smoker   • Smokeless tobacco: Never Used   Substance Use Topics   • Alcohol use: No   • Drug use: No   ,     Medications  Current Outpatient Medications   Medication Sig Dispense Refill   • albuterol (PROVENTIL HFA;VENTOLIN HFA) 108 (90 BASE) MCG/ACT inhaler 2 puffs 4 (Four) Times a Day.     • amLODIPine (NORVASC) 10 MG tablet Take 10 mg by mouth Daily.     • carboxymethylcellulose (REFRESH PLUS) 0.5 % solution 3 (Three) Times a Day As Needed for Dry Eyes.     • diazePAM (VALIUM) 5 MG tablet Take 5 mg by mouth 3 (Three) Times a Day.     • DULoxetine (CYMBALTA) 60 MG capsule Take 60 mg by mouth 2 (Two) Times a Day.     • Ergocalciferol (Vitamin D2) 50 MCG (2000 UT) tablet Take  by mouth 1 (One) Time Per Week.     • fexofenadine (ALLEGRA) 180 MG tablet Take 180 mg by mouth daily     • fluticasone (FLONASE) 50 MCG/ACT nasal spray 2 sprays into the nostril(s) as directed by provider Daily.     • glipiZIDE (GLUCOTROL) 10 MG tablet Take 10 mg by mouth 2 (Two) Times a Day Before Meals.     • hydrochlorothiazide (MICROZIDE) 12.5 MG capsule Take 1 capsule by mouth Daily As Needed (for edema). (Patient taking differently: Take 25 mg by mouth Daily As Needed (for edema).) 90 capsule 3   • HYDROcodone-acetaminophen (NORCO)  MG per tablet Take 1 tablet by mouth Every 6 (Six) Hours As Needed for Moderate  Pain .     • insulin glargine (LANTUS) 100 UNIT/ML injection Inject 55 Units under the skin into the appropriate area as directed 2 (Two) Times a Day.     • insulin lispro (humaLOG) 100 UNIT/ML injection Inject  under the skin into the appropriate area as directed 3 (Three) Times a Day Before Meals.     • ketoconazole (NIZORAL) 200 MG tablet Daily As Needed.     • losartan (COZAAR) 100 MG tablet      • meclizine (ANTIVERT) 12.5 MG tablet Take 12.5 mg by mouth 3 (Three) Times a Day.     • metoprolol tartrate (LOPRESSOR) 50 MG tablet Take 1.5 tablets by mouth Every 12 (Twelve) Hours. 270 tablet 3   • nystatin (MYCOSTATIN) 401100 UNIT/ML suspension Take 500,000 Units by mouth As Needed.     • probiotic (CULTURELLE) capsule capsule Take 1 capsule by mouth Daily.     • simvastatin (ZOCOR) 10 MG tablet Take 10 mg by mouth Every Night.     • tiZANidine (ZANAFLEX) 4 MG tablet 4 mg 3 (Three) Times a Day As Needed for Muscle Spasms.     • traMADol (ULTRAM) 50 MG tablet Take 50 mg by mouth Every 6 (Six) Hours As Needed for Moderate Pain .     • flecainide (TAMBOCOR) 50 MG tablet Take 1 tablet by mouth 2 (Two) Times a Day. 60 tablet 11     No current facility-administered medications for this visit.        Allergies:  Azelastine-fluticasone, Contrast dye, Iodides, Metformin and related, Oxycodone-aspirin, Nisoldipine er, Pregabalin, and Sulfa antibiotics    Review of Systems  Review of Systems   Constitution: Positive for malaise/fatigue.   HENT: Negative.    Eyes: Negative.    Cardiovascular: Positive for chest pain, dyspnea on exertion, leg swelling and palpitations. Negative for claudication, cyanosis, irregular heartbeat, near-syncope, orthopnea, paroxysmal nocturnal dyspnea and syncope.   Respiratory: Negative.    Endocrine: Negative.    Hematologic/Lymphatic: Negative.    Skin: Negative.    Musculoskeletal: Positive for arthritis and back pain.   Gastrointestinal: Positive for dysphagia and heartburn. Negative for  "anorexia.   Genitourinary: Negative.    Neurological: Positive for dizziness and weakness.   Psychiatric/Behavioral: Negative.        Objective     Physical Exam:  /86   Pulse 67   Ht 167.6 cm (66\")   Wt 127 kg (280 lb)   LMP  (LMP Unknown)   SpO2 97%   BMI 45.19 kg/m²   Physical Exam   Constitutional: She appears well-developed.   HENT:   Head: Normocephalic.   Neck: Normal carotid pulses and no JVD present. No tracheal tenderness present. Carotid bruit is not present. No tracheal deviation and no edema present.   Cardiovascular: Regular rhythm, normal heart sounds and normal pulses.   Pulmonary/Chest: Effort normal. No stridor.   Abdominal: Soft. She exhibits no distension. There is no abdominal tenderness.     Vascular Status -  Her right foot exhibits abnormal foot edema. Her left foot exhibits abnormal foot edema.  Neurological: She is alert. No cranial nerve deficit or sensory deficit.   Skin: Skin is warm.   Psychiatric: Her speech is normal and behavior is normal.       Results Review:      Results for orders placed during the hospital encounter of 20   Adult Transthoracic Echo Complete W/ Cont if Necessary Per Protocol    Narrative · Left ventricular ejection fraction appears to be 56 - 60%.  · Abnormal global longitudinal LV strain (GLS) = -13%  · Left ventricular diastolic function was normal.  · No evidence of pulmonary hypertension is present.           Barbara Tapia  Holter Monitor 72Hr-21Day (0296T/0298T)  Order# [id number]  Reading physician: Shane Singh MD Ordering physician: Shane Singh MD Study date: 20   Patient Information    Patient Name   Barbara Tapia MRN   [id number] Sex   Female  (Age)   1960 (60 y.o.)   Interpretation Summary    · Entire report was reviewed.  Monitoring in days: ~14   · The predominant rhythm noted during the testing period was sinus rhythm.     · Rare supraventricular ectopics with an APC burden of: < 1%    · Rare premature ventricular " contractions with a PVC burden of: < 1%     · No correlated arrhythmia  · No significant pauses                  Conclusion:      Baseline rhythm is sinus.  No significant ectopy   96 runs of supraventricular tachycardia longest 1 minute and 40 seconds at a maximum rate of 158 bpm and an average of 99 bpm          Zio patch     · Average HR: 64. Min HR: 45. Max HR: 143.     · The predominant rhythm noted during the testing period was sinus rhythm.  · Premature atrial and ventricular contractions occured as below.  · Total ectopy burden during the monitoring period; PVCs:  <1% and APCs: <1%                   · No significant supraventricular or ventricular tachy or kaitlynn arrhythmia.  · 28    supraventricular tachycardia episodes fastest 5    beats at maximum rate of    143    BPM   · No correlated arrhythmia  · No significant pauses  · Entire report was reviewed        Conclusion  2017  Non obstructive  coronary artery disease with dominant RCA  Normal LVEF  LVEDP   29    mm Hg     Plan     Hydration overnight   Nephrology consult  Observation  Risk factor modifications  Workup for non cardiac causes of chest pain        Procedures  ____________________________________________________________________________________________________________________________________________  Health maintenance and recommendations  Similar recommendations as last visit     Offered to give patient  a copy      Questions were encouraged, asked and answered to the patient's  understanding and satisfaction. Questions if any regarding current medications and side effects, need for refills and importance of compliance to medications stressed.    Reviewed available prior notes, consults, prior visits, laboratory findings, radiology and cardiology relevant reports. Updated chart as applicable. I have reviewed the patient's medical history in detail and updated the computerized patient record as relevant.      Updated patient regarding any new or  relevant abnormalities on review of records or any new findings on physical exam. Mentioned to patient about purpose of visit and desirable health short and long term goals and objectives.    Primary to monitor CBC CMP Lipid panel and TSH as applicable    ___________________________________________________________________________________________________________________________________________        Assessment/Plan   Patient Active Problem List   Diagnosis   • Palpitation   • Morbid obesity due to excess calories (CMS/[facility])   • BLADIMIR treated with BiPAP   • Other chest pain   • Essential hypertension   • Type 2 diabetes mellitus with hyperglycemia, with long-term current use of insulin (CMS/[facility])   • Mixed hyperlipidemia   • Anxiety and depression   • Other dysphagia   • Gastroesophageal reflux disease without esophagitis   • Encounter for screening for malignant neoplasm of colon   • Esophageal dysphagia   • Nonsmoker   • Obesity, unspecified obesity severity, unspecified obesity type   • Slow transit constipation   • Sleep apnea   • Obesity, Class III, BMI 40-49.9 (morbid obesity) (CMS/[facility])   • Autonomic orthostatic hypotension   • Cervicalgia   • Cervical radiculopathy due to degenerative joint disease of spine   • Nonobstructive atherosclerosis of coronary artery     Plan         Discussed results of prior testing with patient: zio patch and echo   Zio patch shows supra ventricular   Normal left ventricular ejection fraction and no left ventricular hypertrophy on echo   Patient expressed understanding  Encouraged and answered all questions   Discussed with the patient and all questioned fully answered. She will call me if any problems arise.       Increase beta blocker therapy to 100 mg BID  and add Flecainide   Requested Prescriptions     Signed Prescriptions Disp Refills   • flecainide (TAMBOCOR) 50 MG tablet 60 tablet 11     Sig: Take 1 tablet by mouth 2 (Two) Times a Day.      Will see her back in 4 weeks      Patient was advised to continue BiPAP daily.   In future if palpitations persists will need EP evaluation     I support the patient's decision to take the Covid -19 vaccine            Return in about 4 weeks (around 2/16/2021).

## 2021-03-04 RX ORDER — METOPROLOL TARTRATE 100 MG/1
100 TABLET ORAL 2 TIMES DAILY
Qty: 180 TABLET | Refills: 4 | Status: SHIPPED | OUTPATIENT
Start: 2021-03-04 | End: 2022-03-08

## 2021-04-05 ENCOUNTER — OFFICE VISIT (OUTPATIENT)
Dept: CARDIOLOGY | Facility: CLINIC | Age: 61
End: 2021-04-05

## 2021-04-05 VITALS — HEIGHT: 66 IN | BODY MASS INDEX: 45.64 KG/M2 | WEIGHT: 284 LBS | HEART RATE: 85 BPM | OXYGEN SATURATION: 96 %

## 2021-04-05 DIAGNOSIS — I10 ESSENTIAL HYPERTENSION: ICD-10-CM

## 2021-04-05 DIAGNOSIS — E11.65 TYPE 2 DIABETES MELLITUS WITH HYPERGLYCEMIA, WITH LONG-TERM CURRENT USE OF INSULIN (HCC): ICD-10-CM

## 2021-04-05 DIAGNOSIS — R55 SYNCOPE AND COLLAPSE: ICD-10-CM

## 2021-04-05 DIAGNOSIS — I25.10 NONOBSTRUCTIVE ATHEROSCLEROSIS OF CORONARY ARTERY: ICD-10-CM

## 2021-04-05 DIAGNOSIS — R07.2 PRECORDIAL CHEST PAIN: ICD-10-CM

## 2021-04-05 DIAGNOSIS — Z79.4 TYPE 2 DIABETES MELLITUS WITH HYPERGLYCEMIA, WITH LONG-TERM CURRENT USE OF INSULIN (HCC): ICD-10-CM

## 2021-04-05 DIAGNOSIS — G47.30 SLEEP APNEA, UNSPECIFIED TYPE: ICD-10-CM

## 2021-04-05 DIAGNOSIS — R13.19 ESOPHAGEAL DYSPHAGIA: ICD-10-CM

## 2021-04-05 DIAGNOSIS — R00.2 PALPITATION: Primary | ICD-10-CM

## 2021-04-05 DIAGNOSIS — E66.01 MORBID OBESITY DUE TO EXCESS CALORIES (HCC): ICD-10-CM

## 2021-04-05 PROCEDURE — 99214 OFFICE O/P EST MOD 30 MIN: CPT | Performed by: INTERNAL MEDICINE

## 2021-04-05 NOTE — PROGRESS NOTES
Barbara Tapia  [id number]  1960  61 y.o.  female    Referring Provider: Austin Ocampo MD    Reason for  Visit:      short term office follow up after recent encounter   Chest pain    Shortness of breath and palpitations  Now sees Dr Ocampo now as Dr Bartlett has retired  Cardiac workup test results as below : zio patch and echo     Subjective    Palpitations much better on current beta blocker therapy and Flecainide     Prior syncope, woke up on floor  No receeding palpitations, no incontinence of urine or stools, no preceeding chest pain. No aura.  No seizure like activity  Not sure what happens   First episode of syncope 3 months ago   Not sure if blood sugars drop     Chest pain with exertion, but at times can also occur at rest   Moderate substernal,   Pressure like   Chest pain non pleuritic    Chest pain non positional and non gustatory   Lasts less than 5 minutes    Started >3 months ago  Occurs once or twice a week  No associated diaphoresis    No associated nausea  No radiation    Relieved with rest or spontaneously  Not positional    No change with intake of food or antacids  No change with breathing  Mild to moderate associated dyspnea      No associated palpitations  No similar chest pain episodes in the past    Joint pain in small, medium and large joints    Using BiPAP         History of present illness:  Barbara Tapia is a 61 y.o. yo female with history of Essential Hypertension, Type 2 diabetes mellitus  Morbid obesity     who presents today for   Chief Complaint   Patient presents with   • Palpitations     3 mo f/u   .    History  Past Medical History:   Diagnosis Date   • Anxiety    • Anxiety    • Arthritis    • Asthma    • Back pain    • Cervicalgia 5/14/2020   • Chest pain    • Chronic fatigue    • Chronic kidney disease     Kidneys are only functioning 50 %   • Depression    • Diabetes (CMS/[facility])    • Diabetes mellitus (CMS/HCC)    • Dizzy    • Fibromyalgia    • Fibromyalgia    • Hiatal  hernia    • Hypertension    • Infection of skin and subcutaneous tissue due to fungus    • Knee contracture     total   • Palpitations    • Panic attacks    • PONV (postoperative nausea and vomiting)    • Sleep apnea    • Sleep apnea     BiPAP   • Vertigo    ,   Past Surgical History:   Procedure Laterality Date   • ANKLE FUSION Left    • ANKLE SURGERY Left     multiple surgeries   • ANTERIOR CERVICAL DISCECTOMY W/ FUSION N/A 2020    Procedure: ANTERIOR CERVICAL DISCECTOMY FUSION C5-7;  Surgeon: FLO Blanchard MD;  Location: [facility] OR;  Service: Orthopedic Spine;  Laterality: N/A;   • CARDIAC CATHETERIZATION Left 2017    Procedure: Cardiac Catheterization/Vascular Study;  Surgeon: Shane Singh MD;  Location:  [facility] CATH INVASIVE LOCATION;  Service:    • CARDIAC CATHETERIZATION N/A 2017    Procedure: Coronary angiography;  Surgeon: Shane Singh MD;  Location:  [facility] CATH INVASIVE LOCATION;  Service:    • CARDIAC CATHETERIZATION N/A 2017    Procedure: Left Heart Cath;  Surgeon: Shane Singh MD;  Location: [facility] CATH INVASIVE LOCATION;  Service:    • CERVICAL FUSION      STITCHED UP TO PREVENT LOSING BABY   • CERVIX LESION DESTRUCTION     •  SECTION     • COLONOSCOPY     • COLONOSCOPY N/A 2017    Procedure: COLONOSCOPY WITH ANESTHESIA;  Surgeon: Raffaele Buchanan MD;  Location: [facility] ENDOSCOPY;  Service:    • ELBOW PROCEDURE Left     screws placed x 2   • ENDOSCOPY N/A 2017    Procedure: ESOPHAGOGASTRODUODENOSCOPY WITH ANESTHESIA;  Surgeon: Raffaele Buchanan MD;  Location: [facility] ENDOSCOPY;  Service:    • EXCISION MASS TRUNK      LEFT SIDE CAT SCRATCH FEVER    • EYE SURGERY      cataract removal sam   • REPLACEMENT TOTAL KNEE BILATERAL Bilateral    • ROTATOR CUFF REPAIR     • SINUS SURGERY     • SUBTOTAL HYSTERECTOMY      ovaries still intact   • TENNIS ELBOW RELEASE Right    ,   Family History   Problem Relation Age of Onset   • Heart disease Mother    • Obesity Mother    •  Diabetes Mother    • Hypertension Mother    • Stroke Mother    • Heart disease Father    • Stroke Brother    • Heart disease Brother    • Colon cancer Neg Hx    • Colon polyps Neg Hx    ,   Social History     Tobacco Use   • Smoking status: Never Smoker   • Smokeless tobacco: Never Used   Substance Use Topics   • Alcohol use: No   • Drug use: No   ,     Medications  Current Outpatient Medications   Medication Sig Dispense Refill   • albuterol (PROVENTIL HFA;VENTOLIN HFA) 108 (90 BASE) MCG/ACT inhaler 2 puffs 4 (Four) Times a Day.     • amLODIPine (NORVASC) 10 MG tablet Take 10 mg by mouth Daily.     • carboxymethylcellulose (REFRESH PLUS) 0.5 % solution 3 (Three) Times a Day As Needed for Dry Eyes.     • diazePAM (VALIUM) 5 MG tablet Take 5 mg by mouth 3 (Three) Times a Day.     • DULoxetine (CYMBALTA) 60 MG capsule Take 60 mg by mouth 2 (Two) Times a Day.     • Ergocalciferol (Vitamin D2) 50 MCG (2000 UT) tablet Take  by mouth 1 (One) Time Per Week.     • fexofenadine (ALLEGRA) 180 MG tablet Take 180 mg by mouth daily     • flecainide (TAMBOCOR) 50 MG tablet Take 1 tablet by mouth 2 (Two) Times a Day. 60 tablet 11   • fluticasone (FLONASE) 50 MCG/ACT nasal spray 2 sprays into the nostril(s) as directed by provider Daily.     • glipiZIDE (GLUCOTROL) 10 MG tablet Take 10 mg by mouth 2 (Two) Times a Day Before Meals.     • hydrochlorothiazide (MICROZIDE) 12.5 MG capsule Take 1 capsule by mouth Daily As Needed (for edema). (Patient taking differently: Take 25 mg by mouth Daily As Needed (for edema).) 90 capsule 3   • HYDROcodone-acetaminophen (NORCO)  MG per tablet Take 1 tablet by mouth Every 6 (Six) Hours As Needed for Moderate Pain .     • insulin glargine (LANTUS) 100 UNIT/ML injection Inject 55 Units under the skin into the appropriate area as directed 2 (Two) Times a Day.     • insulin lispro (humaLOG) 100 UNIT/ML injection Inject  under the skin into the appropriate area as directed 3 (Three) Times a Day  "Before Meals.     • ketoconazole (NIZORAL) 200 MG tablet Daily As Needed.     • losartan (COZAAR) 100 MG tablet      • meclizine (ANTIVERT) 12.5 MG tablet Take 12.5 mg by mouth 3 (Three) Times a Day.     • metoprolol tartrate (LOPRESSOR) 100 MG tablet Take 1 tablet by mouth 2 (Two) Times a Day. 180 tablet 4   • nystatin (MYCOSTATIN) 674794 UNIT/ML suspension Take 500,000 Units by mouth As Needed.     • probiotic (CULTURELLE) capsule capsule Take 1 capsule by mouth Daily.     • simvastatin (ZOCOR) 10 MG tablet Take 10 mg by mouth Every Night.     • tiZANidine (ZANAFLEX) 4 MG tablet 4 mg 3 (Three) Times a Day As Needed for Muscle Spasms.     • traMADol (ULTRAM) 50 MG tablet Take 50 mg by mouth Every 6 (Six) Hours As Needed for Moderate Pain .       No current facility-administered medications for this visit.       Allergies:  Azelastine-fluticasone, Contrast dye, Iodides, Metformin and related, Oxycodone-aspirin, Nisoldipine er, Pregabalin, and Sulfa antibiotics    Review of Systems  Review of Systems   Constitutional: Positive for malaise/fatigue.   HENT: Negative.    Eyes: Negative.    Cardiovascular: Positive for chest pain, dyspnea on exertion, leg swelling and syncope. Negative for claudication, cyanosis, irregular heartbeat, near-syncope, orthopnea, palpitations and paroxysmal nocturnal dyspnea.   Respiratory: Negative.    Endocrine: Negative.    Hematologic/Lymphatic: Negative.    Skin: Negative.    Musculoskeletal: Positive for arthritis and back pain.   Gastrointestinal: Positive for dysphagia and heartburn. Negative for anorexia.   Genitourinary: Negative.    Neurological: Positive for dizziness and weakness.   Psychiatric/Behavioral: Negative.        Objective     Physical Exam:  Pulse 85   Ht 167.6 cm (66\")   Wt 129 kg (284 lb)   LMP  (LMP Unknown)   SpO2 96%   BMI 45.84 kg/m²   Physical Exam   Constitutional: She appears well-developed.   HENT:   Head: Normocephalic.   Neck: Normal carotid pulses and " no JVD present. No tracheal tenderness present. Carotid bruit is not present. No tracheal deviation present.   Cardiovascular: Regular rhythm, normal heart sounds and normal pulses.   Pulmonary/Chest: Effort normal. No stridor.   Abdominal: Soft. She exhibits no distension. There is no abdominal tenderness.     Vascular Status -  Her right foot exhibits abnormal foot edema. Her left foot exhibits abnormal foot edema.  Neurological: She is alert. No cranial nerve deficit or sensory deficit.   Skin: Skin is warm.   Psychiatric: Her speech is normal and behavior is normal.       Results Review:      Results for orders placed during the hospital encounter of 20    Adult Transthoracic Echo Complete W/ Cont if Necessary Per Protocol    Interpretation Summary  · Left ventricular ejection fraction appears to be 56 - 60%.  · Abnormal global longitudinal LV strain (GLS) = -13%  · Left ventricular diastolic function was normal.  · No evidence of pulmonary hypertension is present.       Barbara Tapia  Holter Monitor 72Hr-21Day (0296T/0298T)  Order# [id number]  Reading physician: Shane Singh MD Ordering physician: Shane Singh MD Study date: 20   Patient Information    Patient Name   Barbara Tapia MRN   [id number] Sex   Female  (Age)   1960 (60 y.o.)   Interpretation Summary    · Entire report was reviewed.  Monitoring in days: ~14   · The predominant rhythm noted during the testing period was sinus rhythm.     · Rare supraventricular ectopics with an APC burden of: < 1%    · Rare premature ventricular contractions with a PVC burden of: < 1%     · No correlated arrhythmia  · No significant pauses                  Conclusion:      Baseline rhythm is sinus.  No significant ectopy   96 runs of supraventricular tachycardia longest 1 minute and 40 seconds at a maximum rate of 158 bpm and an average of 99 bpm          Zio patch     · Average HR: 64. Min HR: 45. Max HR: 143.     · The predominant rhythm noted  during the testing period was sinus rhythm.  · Premature atrial and ventricular contractions occured as below.  · Total ectopy burden during the monitoring period; PVCs:  <1% and APCs: <1%                   · No significant supraventricular or ventricular tachy or kaitlynn arrhythmia.  · 28    supraventricular tachycardia episodes fastest 5    beats at maximum rate of    143    BPM   · No correlated arrhythmia  · No significant pauses  · Entire report was reviewed        Conclusion  2017  Non obstructive  coronary artery disease with dominant RCA  Normal LVEF  LVEDP   29    mm Hg     Plan     Hydration overnight   Nephrology consult  Observation  Risk factor modifications  Workup for non cardiac causes of chest pain        Procedures  ____________________________________________________________________________________________________________________________________________  Health maintenance and recommendations  Similar recommendations as last visit     Offered to give patient  a copy      Questions were encouraged, asked and answered to the patient's  understanding and satisfaction. Questions if any regarding current medications and side effects, need for refills and importance of compliance to medications stressed.    Reviewed available prior notes, consults, prior visits, laboratory findings, radiology and cardiology relevant reports. Updated chart as applicable. I have reviewed the patient's medical history in detail and updated the computerized patient record as relevant.      Updated patient regarding any new or relevant abnormalities on review of records or any new findings on physical exam. Mentioned to patient about purpose of visit and desirable health short and long term goals and objectives.    Primary to monitor CBC CMP Lipid panel and TSH as applicable    ___________________________________________________________________________________________________________________________________________         Assessment/Plan   Patient Active Problem List   Diagnosis   • Palpitation   • Morbid obesity due to excess calories (CMS/[facility])   • BLADIMIR treated with BiPAP   • Other chest pain   • Essential hypertension   • Type 2 diabetes mellitus with hyperglycemia, with long-term current use of insulin (CMS/[facility])   • Mixed hyperlipidemia   • Anxiety and depression   • Other dysphagia   • Gastroesophageal reflux disease without esophagitis   • Encounter for screening for malignant neoplasm of colon   • Esophageal dysphagia   • Nonsmoker   • Obesity, unspecified obesity severity, unspecified obesity type   • Slow transit constipation   • Sleep apnea   • Obesity, Class III, BMI 40-49.9 (morbid obesity) (CMS/[facility])   • Autonomic orthostatic hypotension   • Cervicalgia   • Cervical radiculopathy due to degenerative joint disease of spine   • Nonobstructive atherosclerosis of coronary artery     Plan         Orders Placed This Encounter   Procedures   • Ambulatory Referral to Neurology     Referral Priority:   Routine     Referral Type:   Consultation     Referral Reason:   Specialty Services Required     Requested Specialty:   Neurology     Number of Visits Requested:   1   • Loop Recorder Implant - Treatment Room     LINQ     Standing Status:   Future     Standing Expiration Date:   4/5/2022     Order Specific Question:   What type of sedation does the patient require?     Answer:   Other     Order Specific Question:   Other (comment)     Answer:   local     Order Specific Question:   Reason for Exam:     Answer:   syncope   • Adult Stress Echo W/ Cont or Stress Agent if Necessary Per Protocol     Standing Status:   Future     Standing Expiration Date:   4/5/2022     Order Specific Question:   What stress agent will be used?     Answer:   Dobutamine     Order Specific Question:   Difficulty walking criteria?     Answer:   Musculoskeletal (hips, knees, feet, back, amputee)     Order Specific Question:   Reason for exam?     Answer:    Chest Pain      Keep A1c less than 7 Primary to monitor  Keep LDL below 70 mg/dl. Monitor liver and renal functions.   Monitor CBC, CMP, TSH (as indicated) and Lipid Panel by primary    MONITOR BLOOD SUGARS AT HOME     Patient was advised to continue BiPAP daily.      I support the patient's decision to take the Covid -19 vaccine    Had first dose     Follow up with ELTON Yan to address interim issues             Return in about 6 weeks (around 5/17/2021).

## 2021-04-09 ENCOUNTER — HOSPITAL ENCOUNTER (OUTPATIENT)
Dept: CARDIOLOGY | Facility: HOSPITAL | Age: 61
Discharge: HOME | End: 2021-04-09

## 2021-04-09 ENCOUNTER — HOSPITAL ENCOUNTER (OUTPATIENT)
Dept: CARDIOLOGY | Facility: HOSPITAL | Age: 61
Setting detail: HOSPITAL OUTPATIENT SURGERY
Discharge: HOME | End: 2021-04-09

## 2021-04-09 VITALS
HEIGHT: 66 IN | HEART RATE: 56 BPM | DIASTOLIC BLOOD PRESSURE: 85 MMHG | WEIGHT: 284.39 LBS | SYSTOLIC BLOOD PRESSURE: 170 MMHG | BODY MASS INDEX: 45.71 KG/M2

## 2021-04-09 VITALS
RESPIRATION RATE: 18 BRPM | OXYGEN SATURATION: 96 % | SYSTOLIC BLOOD PRESSURE: 139 MMHG | DIASTOLIC BLOOD PRESSURE: 74 MMHG | TEMPERATURE: 96.9 F | HEART RATE: 68 BPM

## 2021-04-09 DIAGNOSIS — R55 SYNCOPE AND COLLAPSE: ICD-10-CM

## 2021-04-09 DIAGNOSIS — R07.2 PRECORDIAL CHEST PAIN: ICD-10-CM

## 2021-04-09 PROCEDURE — 93352 ADMIN ECG CONTRAST AGENT: CPT | Performed by: INTERNAL MEDICINE

## 2021-04-09 PROCEDURE — 25010000003 ATROPINE SULFATE: Performed by: INTERNAL MEDICINE

## 2021-04-09 PROCEDURE — 93350 STRESS TTE ONLY: CPT | Performed by: INTERNAL MEDICINE

## 2021-04-09 PROCEDURE — 33285 INSJ SUBQ CAR RHYTHM MNTR: CPT | Performed by: INTERNAL MEDICINE

## 2021-04-09 PROCEDURE — 93017 CV STRESS TEST TRACING ONLY: CPT

## 2021-04-09 PROCEDURE — 33285 INSJ SUBQ CAR RHYTHM MNTR: CPT

## 2021-04-09 PROCEDURE — 25010000003 DOBUTAMINE PER 250 MG: Performed by: INTERNAL MEDICINE

## 2021-04-09 PROCEDURE — 93350 STRESS TTE ONLY: CPT

## 2021-04-09 PROCEDURE — 25010000003 LIDOCAINE 1 % SOLUTION: Performed by: INTERNAL MEDICINE

## 2021-04-09 PROCEDURE — C1764 EVENT RECORDER, CARDIAC: HCPCS

## 2021-04-09 PROCEDURE — 25010000002 PERFLUTREN 6.52 MG/ML SUSPENSION: Performed by: INTERNAL MEDICINE

## 2021-04-09 PROCEDURE — 93018 CV STRESS TEST I&R ONLY: CPT | Performed by: INTERNAL MEDICINE

## 2021-04-09 RX ORDER — LIDOCAINE HYDROCHLORIDE 10 MG/ML
INJECTION, SOLUTION INFILTRATION; PERINEURAL
Status: COMPLETED | OUTPATIENT
Start: 2021-04-09 | End: 2021-04-09

## 2021-04-09 RX ORDER — DOBUTAMINE HYDROCHLORIDE 100 MG/100ML
10-50 INJECTION INTRAVENOUS CONTINUOUS
Status: DISCONTINUED | OUTPATIENT
Start: 2021-04-09 | End: 2021-04-10 | Stop reason: HOSPADM

## 2021-04-09 RX ORDER — LIDOCAINE HYDROCHLORIDE 10 MG/ML
INJECTION, SOLUTION INFILTRATION; PERINEURAL
Status: DISCONTINUED
Start: 2021-04-09 | End: 2021-04-10 | Stop reason: HOSPADM

## 2021-04-09 RX ADMIN — PERFLUTREN 8.48 MG: 6.52 INJECTION, SUSPENSION INTRAVENOUS at 12:29

## 2021-04-09 RX ADMIN — LIDOCAINE HYDROCHLORIDE 5 ML: 10 INJECTION, SOLUTION INFILTRATION; PERINEURAL at 13:52

## 2021-04-09 RX ADMIN — ATROPINE SULFATE 2 MG: 0.1 INJECTION PARENTERAL at 12:59

## 2021-04-09 RX ADMIN — Medication 10 MCG/KG/MIN: at 12:30

## 2021-04-10 LAB
BH CV STRESS BP STAGE 1: NORMAL
BH CV STRESS BP STAGE 2: NORMAL
BH CV STRESS BP STAGE 3: NORMAL
BH CV STRESS BP STAGE 4: NORMAL
BH CV STRESS BP STAGE 5: NORMAL
BH CV STRESS DOB - ATROPINE STAGE 3: 1
BH CV STRESS DOB - ATROPINE STAGE 4: 1
BH CV STRESS DOSE DOBUTAMINE STAGE 1: 10
BH CV STRESS DOSE DOBUTAMINE STAGE 2: 20
BH CV STRESS DOSE DOBUTAMINE STAGE 3: 30
BH CV STRESS DOSE DOBUTAMINE STAGE 4: 40
BH CV STRESS DOSE DOBUTAMINE STAGE 5: 50
BH CV STRESS DURATION MIN STAGE 1: 3
BH CV STRESS DURATION MIN STAGE 2: 3
BH CV STRESS DURATION MIN STAGE 3: 3
BH CV STRESS DURATION MIN STAGE 4: 3
BH CV STRESS DURATION MIN STAGE 5: 1
BH CV STRESS DURATION SEC STAGE 1: 0
BH CV STRESS DURATION SEC STAGE 2: 0
BH CV STRESS DURATION SEC STAGE 3: 0
BH CV STRESS DURATION SEC STAGE 4: 0
BH CV STRESS DURATION SEC STAGE 5: 39
BH CV STRESS ECHO POST STRESS EJECTION FRACTION EF: 65 %
BH CV STRESS HR STAGE 1: 56
BH CV STRESS HR STAGE 2: 60
BH CV STRESS HR STAGE 3: 101
BH CV STRESS HR STAGE 4: 115
BH CV STRESS HR STAGE 5: 120
BH CV STRESS PROTOCOL 1: NORMAL
BH CV STRESS RECOVERY BP: NORMAL MMHG
BH CV STRESS RECOVERY HR: 85 BPM
BH CV STRESS STAGE 1: 1
BH CV STRESS STAGE 2: 2
BH CV STRESS STAGE 3: 3
BH CV STRESS STAGE 4: 4
BH CV STRESS STAGE 5: 5
LV EF 2D ECHO EST: 55 %
PERCENT MAX PREDICTED HR: 76.43 %
STRESS BASELINE BP: NORMAL MMHG
STRESS BASELINE HR: 56 BPM
STRESS PERCENT HR: 90 %
STRESS POST EXERCISE DUR MIN: 13 MIN
STRESS POST EXERCISE DUR SEC: 39 SEC
STRESS POST PEAK BP: NORMAL MMHG
STRESS POST PEAK HR: 120 BPM

## 2021-05-14 PROBLEM — I47.10 PAROXYSMAL SVT (SUPRAVENTRICULAR TACHYCARDIA): Status: ACTIVE | Noted: 2021-05-14

## 2021-05-14 PROBLEM — I10 ESSENTIAL HYPERTENSION: Chronic | Status: ACTIVE | Noted: 2017-03-17

## 2021-05-14 PROBLEM — E78.2 MIXED HYPERLIPIDEMIA: Chronic | Status: ACTIVE | Noted: 2017-03-17

## 2021-05-14 PROBLEM — E66.813 CLASS 3 SEVERE OBESITY DUE TO EXCESS CALORIES WITH SERIOUS COMORBIDITY AND BODY MASS INDEX (BMI) OF 45.0 TO 49.9 IN ADULT: Status: ACTIVE | Noted: 2018-03-20

## 2021-05-14 PROBLEM — I47.1 PAROXYSMAL SVT (SUPRAVENTRICULAR TACHYCARDIA) (HCC): Status: ACTIVE | Noted: 2021-05-14

## 2021-05-17 ENCOUNTER — LAB (OUTPATIENT)
Dept: LAB | Facility: HOSPITAL | Age: 61
End: 2021-05-17

## 2021-05-17 ENCOUNTER — PREP FOR SURGERY (OUTPATIENT)
Dept: OTHER | Facility: HOSPITAL | Age: 61
End: 2021-05-17

## 2021-05-17 ENCOUNTER — OFFICE VISIT (OUTPATIENT)
Dept: CARDIOLOGY | Facility: CLINIC | Age: 61
End: 2021-05-17

## 2021-05-17 VITALS
WEIGHT: 283 LBS | HEART RATE: 58 BPM | OXYGEN SATURATION: 98 % | DIASTOLIC BLOOD PRESSURE: 82 MMHG | BODY MASS INDEX: 45.48 KG/M2 | HEIGHT: 66 IN | SYSTOLIC BLOOD PRESSURE: 136 MMHG

## 2021-05-17 DIAGNOSIS — I10 ESSENTIAL HYPERTENSION: ICD-10-CM

## 2021-05-17 DIAGNOSIS — I25.10 NONOBSTRUCTIVE ATHEROSCLEROSIS OF CORONARY ARTERY: ICD-10-CM

## 2021-05-17 DIAGNOSIS — E11.65 TYPE 2 DIABETES MELLITUS WITH HYPERGLYCEMIA, WITH LONG-TERM CURRENT USE OF INSULIN (HCC): ICD-10-CM

## 2021-05-17 DIAGNOSIS — N18.30 STAGE 3 CHRONIC KIDNEY DISEASE, UNSPECIFIED WHETHER STAGE 3A OR 3B CKD (HCC): Primary | ICD-10-CM

## 2021-05-17 DIAGNOSIS — N18.30 STAGE 3 CHRONIC KIDNEY DISEASE, UNSPECIFIED WHETHER STAGE 3A OR 3B CKD (HCC): ICD-10-CM

## 2021-05-17 DIAGNOSIS — G47.33 OSA TREATED WITH BIPAP: ICD-10-CM

## 2021-05-17 DIAGNOSIS — E78.2 MIXED HYPERLIPIDEMIA: ICD-10-CM

## 2021-05-17 DIAGNOSIS — Z79.4 TYPE 2 DIABETES MELLITUS WITH HYPERGLYCEMIA, WITH LONG-TERM CURRENT USE OF INSULIN (HCC): ICD-10-CM

## 2021-05-17 DIAGNOSIS — E66.01 CLASS 3 SEVERE OBESITY DUE TO EXCESS CALORIES WITH SERIOUS COMORBIDITY AND BODY MASS INDEX (BMI) OF 45.0 TO 49.9 IN ADULT (HCC): ICD-10-CM

## 2021-05-17 DIAGNOSIS — I47.1 PAROXYSMAL SVT (SUPRAVENTRICULAR TACHYCARDIA) (HCC): Primary | ICD-10-CM

## 2021-05-17 LAB
ANION GAP SERPL CALCULATED.3IONS-SCNC: 11 MMOL/L (ref 5–15)
BUN SERPL-MCNC: 13 MG/DL (ref 8–23)
BUN/CREAT SERPL: 13.3 (ref 7–25)
CALCIUM SPEC-SCNC: 10.1 MG/DL (ref 8.6–10.5)
CHLORIDE SERPL-SCNC: 96 MMOL/L (ref 98–107)
CO2 SERPL-SCNC: 28 MMOL/L (ref 22–29)
CREAT SERPL-MCNC: 0.98 MG/DL (ref 0.57–1)
GFR SERPL CREATININE-BSD FRML MDRD: 57 ML/MIN/1.73
GLUCOSE SERPL-MCNC: 174 MG/DL (ref 65–99)
POTASSIUM SERPL-SCNC: 4.4 MMOL/L (ref 3.5–5.2)
SODIUM SERPL-SCNC: 135 MMOL/L (ref 136–145)

## 2021-05-17 PROCEDURE — 36415 COLL VENOUS BLD VENIPUNCTURE: CPT

## 2021-05-17 PROCEDURE — 80048 BASIC METABOLIC PNL TOTAL CA: CPT

## 2021-05-17 PROCEDURE — 99214 OFFICE O/P EST MOD 30 MIN: CPT | Performed by: NURSE PRACTITIONER

## 2021-05-17 PROCEDURE — 93000 ELECTROCARDIOGRAM COMPLETE: CPT | Performed by: NURSE PRACTITIONER

## 2021-05-17 RX ORDER — PREDNISONE 50 MG/1
50 TABLET ORAL TAKE AS DIRECTED
Qty: 3 TABLET | Refills: 0 | Status: SHIPPED | OUTPATIENT
Start: 2021-05-17 | End: 2021-07-01

## 2021-05-17 RX ORDER — CEPHALEXIN 500 MG/1
500 CAPSULE ORAL 2 TIMES DAILY
Qty: 20 CAPSULE | Refills: 0 | Status: SHIPPED | OUTPATIENT
Start: 2021-05-17 | End: 2021-05-27

## 2021-05-17 RX ORDER — SODIUM CHLORIDE 9 MG/ML
100 INJECTION, SOLUTION INTRAVENOUS CONTINUOUS
Status: CANCELLED | OUTPATIENT
Start: 2021-05-28 | End: 2021-05-28

## 2021-05-28 ENCOUNTER — HOSPITAL ENCOUNTER (OUTPATIENT)
Dept: CT IMAGING | Facility: HOSPITAL | Age: 61
Discharge: HOME | End: 2021-05-28
Admitting: NURSE PRACTITIONER

## 2021-05-28 VITALS
DIASTOLIC BLOOD PRESSURE: 74 MMHG | HEART RATE: 69 BPM | BODY MASS INDEX: 45.34 KG/M2 | TEMPERATURE: 97.7 F | SYSTOLIC BLOOD PRESSURE: 152 MMHG | OXYGEN SATURATION: 95 % | WEIGHT: 282.1 LBS | HEIGHT: 66 IN | RESPIRATION RATE: 18 BRPM

## 2021-05-28 DIAGNOSIS — I25.10 NONOBSTRUCTIVE ATHEROSCLEROSIS OF CORONARY ARTERY: ICD-10-CM

## 2021-05-28 DIAGNOSIS — N18.30 STAGE 3 CHRONIC KIDNEY DISEASE, UNSPECIFIED WHETHER STAGE 3A OR 3B CKD (HCC): ICD-10-CM

## 2021-05-28 LAB
ANION GAP SERPL CALCULATED.3IONS-SCNC: 14 MMOL/L (ref 5–15)
BUN SERPL-MCNC: 12 MG/DL (ref 8–23)
BUN/CREAT SERPL: 11.9 (ref 7–25)
CALCIUM SPEC-SCNC: 9.8 MG/DL (ref 8.6–10.5)
CHLORIDE SERPL-SCNC: 90 MMOL/L (ref 98–107)
CO2 SERPL-SCNC: 26 MMOL/L (ref 22–29)
CREAT SERPL-MCNC: 1.01 MG/DL (ref 0.57–1)
GFR SERPL CREATININE-BSD FRML MDRD: 55 ML/MIN/1.73
GLUCOSE SERPL-MCNC: 307 MG/DL (ref 65–99)
POTASSIUM SERPL-SCNC: 4.1 MMOL/L (ref 3.5–5.2)
SODIUM SERPL-SCNC: 130 MMOL/L (ref 136–145)

## 2021-05-28 PROCEDURE — 75574 CT ANGIO HRT W/3D IMAGE: CPT | Performed by: INTERNAL MEDICINE

## 2021-05-28 PROCEDURE — 63710000001 NITROGLYCERIN 0.4 MG SUBLINGUAL TABLET 25 EACH BOTTLE: Performed by: NURSE PRACTITIONER

## 2021-05-28 PROCEDURE — 0 IOPAMIDOL PER 1 ML: Performed by: NURSE PRACTITIONER

## 2021-05-28 PROCEDURE — 75574 CT ANGIO HRT W/3D IMAGE: CPT

## 2021-05-28 PROCEDURE — A9270 NON-COVERED ITEM OR SERVICE: HCPCS | Performed by: NURSE PRACTITIONER

## 2021-05-28 PROCEDURE — 80048 BASIC METABOLIC PNL TOTAL CA: CPT | Performed by: NURSE PRACTITIONER

## 2021-05-28 RX ORDER — SODIUM CHLORIDE 0.9 % (FLUSH) 0.9 %
10 SYRINGE (ML) INJECTION AS NEEDED
Status: DISCONTINUED | OUTPATIENT
Start: 2021-05-28 | End: 2021-05-29 | Stop reason: HOSPADM

## 2021-05-28 RX ORDER — SODIUM CHLORIDE 0.9 % (FLUSH) 0.9 %
3 SYRINGE (ML) INJECTION EVERY 12 HOURS SCHEDULED
Status: DISCONTINUED | OUTPATIENT
Start: 2021-05-28 | End: 2021-05-29 | Stop reason: HOSPADM

## 2021-05-28 RX ORDER — METOPROLOL TARTRATE 50 MG/1
50 TABLET, FILM COATED ORAL ONCE AS NEEDED
Status: DISCONTINUED | OUTPATIENT
Start: 2021-05-28 | End: 2021-05-29 | Stop reason: HOSPADM

## 2021-05-28 RX ORDER — SODIUM CHLORIDE 9 MG/ML
100 INJECTION, SOLUTION INTRAVENOUS CONTINUOUS
Status: DISPENSED | OUTPATIENT
Start: 2021-05-28 | End: 2021-05-28

## 2021-05-28 RX ORDER — NITROGLYCERIN 0.4 MG/1
0.4 TABLET SUBLINGUAL
Status: COMPLETED | OUTPATIENT
Start: 2021-05-28 | End: 2021-05-28

## 2021-05-28 RX ORDER — LIDOCAINE HYDROCHLORIDE 10 MG/ML
5 INJECTION, SOLUTION EPIDURAL; INFILTRATION; INTRACAUDAL; PERINEURAL AS NEEDED
Status: DISCONTINUED | OUTPATIENT
Start: 2021-05-28 | End: 2021-05-29 | Stop reason: HOSPADM

## 2021-05-28 RX ORDER — METOPROLOL TARTRATE 100 MG/1
100 TABLET ORAL ONCE AS NEEDED
Status: DISCONTINUED | OUTPATIENT
Start: 2021-05-28 | End: 2021-05-29 | Stop reason: HOSPADM

## 2021-05-28 RX ORDER — METOPROLOL TARTRATE 5 MG/5ML
5 INJECTION INTRAVENOUS
Status: DISCONTINUED | OUTPATIENT
Start: 2021-05-28 | End: 2021-05-29 | Stop reason: HOSPADM

## 2021-05-28 RX ADMIN — SODIUM CHLORIDE 100 ML/HR: 9 INJECTION, SOLUTION INTRAVENOUS at 16:11

## 2021-05-28 RX ADMIN — SODIUM CHLORIDE 100 ML/HR: 9 INJECTION, SOLUTION INTRAVENOUS at 10:30

## 2021-05-28 RX ADMIN — NITROGLYCERIN 0.4 MG: 0.4 TABLET SUBLINGUAL at 15:09

## 2021-05-28 RX ADMIN — IOPAMIDOL 150 ML: 755 INJECTION, SOLUTION INTRAVENOUS at 15:57

## 2021-06-30 PROBLEM — E11.65 TYPE 2 DIABETES MELLITUS WITH HYPERGLYCEMIA, WITH LONG-TERM CURRENT USE OF INSULIN: Chronic | Status: ACTIVE | Noted: 2017-03-17

## 2021-06-30 PROBLEM — G47.33 OSA TREATED WITH BIPAP: Chronic | Status: ACTIVE | Noted: 2017-03-17

## 2021-06-30 PROBLEM — Z79.4 TYPE 2 DIABETES MELLITUS WITH HYPERGLYCEMIA, WITH LONG-TERM CURRENT USE OF INSULIN: Chronic | Status: ACTIVE | Noted: 2017-03-17

## 2021-06-30 PROBLEM — N18.30 CKD (CHRONIC KIDNEY DISEASE) STAGE 3, GFR 30-59 ML/MIN (HCC): Chronic | Status: ACTIVE | Noted: 2021-05-17

## 2021-07-01 ENCOUNTER — PREP FOR SURGERY (OUTPATIENT)
Dept: OTHER | Facility: HOSPITAL | Age: 61
End: 2021-07-01

## 2021-07-01 ENCOUNTER — OFFICE VISIT (OUTPATIENT)
Dept: CARDIOLOGY | Facility: CLINIC | Age: 61
End: 2021-07-01

## 2021-07-01 VITALS
BODY MASS INDEX: 45.64 KG/M2 | HEART RATE: 76 BPM | OXYGEN SATURATION: 96 % | HEIGHT: 66 IN | DIASTOLIC BLOOD PRESSURE: 88 MMHG | WEIGHT: 284 LBS | SYSTOLIC BLOOD PRESSURE: 160 MMHG

## 2021-07-01 DIAGNOSIS — N18.30 STAGE 3 CHRONIC KIDNEY DISEASE, UNSPECIFIED WHETHER STAGE 3A OR 3B CKD (HCC): Chronic | ICD-10-CM

## 2021-07-01 DIAGNOSIS — G47.33 OSA TREATED WITH BIPAP: Chronic | ICD-10-CM

## 2021-07-01 DIAGNOSIS — Z79.4 TYPE 2 DIABETES MELLITUS WITH HYPERGLYCEMIA, WITH LONG-TERM CURRENT USE OF INSULIN (HCC): Chronic | ICD-10-CM

## 2021-07-01 DIAGNOSIS — E11.65 TYPE 2 DIABETES MELLITUS WITH HYPERGLYCEMIA, WITH LONG-TERM CURRENT USE OF INSULIN (HCC): Chronic | ICD-10-CM

## 2021-07-01 DIAGNOSIS — E66.01 CLASS 3 SEVERE OBESITY DUE TO EXCESS CALORIES WITH SERIOUS COMORBIDITY AND BODY MASS INDEX (BMI) OF 45.0 TO 49.9 IN ADULT (HCC): ICD-10-CM

## 2021-07-01 DIAGNOSIS — I25.10 NONOBSTRUCTIVE ATHEROSCLEROSIS OF CORONARY ARTERY: ICD-10-CM

## 2021-07-01 DIAGNOSIS — R93.1 ABNORMAL CARDIAC CT ANGIOGRAPHY: Primary | ICD-10-CM

## 2021-07-01 DIAGNOSIS — I10 ESSENTIAL HYPERTENSION: Chronic | ICD-10-CM

## 2021-07-01 DIAGNOSIS — I47.1 PAROXYSMAL SVT (SUPRAVENTRICULAR TACHYCARDIA) (HCC): Primary | ICD-10-CM

## 2021-07-01 DIAGNOSIS — E78.2 MIXED HYPERLIPIDEMIA: Chronic | ICD-10-CM

## 2021-07-01 PROCEDURE — 99214 OFFICE O/P EST MOD 30 MIN: CPT | Performed by: NURSE PRACTITIONER

## 2021-07-01 RX ORDER — SODIUM CHLORIDE 0.9 % (FLUSH) 0.9 %
3 SYRINGE (ML) INJECTION EVERY 12 HOURS SCHEDULED
Status: CANCELLED | OUTPATIENT
Start: 2021-07-01

## 2021-07-01 RX ORDER — ONDANSETRON 2 MG/ML
4 INJECTION INTRAMUSCULAR; INTRAVENOUS EVERY 6 HOURS PRN
Status: CANCELLED | OUTPATIENT
Start: 2021-07-01

## 2021-07-01 RX ORDER — ASPIRIN 325 MG
325 TABLET ORAL ONCE
Status: CANCELLED | OUTPATIENT
Start: 2021-07-01 | End: 2021-07-01

## 2021-07-01 RX ORDER — ERGOCALCIFEROL 1.25 MG/1
50000 CAPSULE ORAL WEEKLY
COMMUNITY

## 2021-07-01 RX ORDER — NITROGLYCERIN 0.4 MG/1
0.4 TABLET SUBLINGUAL
Status: CANCELLED | OUTPATIENT
Start: 2021-07-01

## 2021-07-01 RX ORDER — SODIUM CHLORIDE 0.9 % (FLUSH) 0.9 %
10 SYRINGE (ML) INJECTION AS NEEDED
Status: CANCELLED | OUTPATIENT
Start: 2021-07-01

## 2021-07-01 RX ORDER — HYDROCHLOROTHIAZIDE 25 MG/1
25 TABLET ORAL DAILY
Qty: 90 TABLET | Refills: 3 | Status: SHIPPED | OUTPATIENT
Start: 2021-07-01 | End: 2022-07-07

## 2021-07-01 RX ORDER — ASPIRIN 81 MG/1
81 TABLET ORAL DAILY
Status: CANCELLED | OUTPATIENT
Start: 2021-07-02

## 2021-07-01 RX ORDER — PREDNISONE 20 MG/1
60 TABLET ORAL TAKE AS DIRECTED
Qty: 6 TABLET | Refills: 0 | Status: SHIPPED | OUTPATIENT
Start: 2021-07-01 | End: 2021-08-03

## 2021-07-01 RX ORDER — DIPHENHYDRAMINE HCL 50 MG
50 CAPSULE ORAL ONCE
Status: CANCELLED | OUTPATIENT
Start: 2021-07-01

## 2021-07-01 RX ORDER — SODIUM CHLORIDE 9 MG/ML
100 INJECTION, SOLUTION INTRAVENOUS CONTINUOUS
Status: CANCELLED | OUTPATIENT
Start: 2021-07-01 | End: 2021-07-01

## 2021-07-01 RX ORDER — FAMOTIDINE 20 MG/1
40 TABLET, FILM COATED ORAL TAKE AS DIRECTED
Qty: 3 TABLET | Refills: 0 | Status: SHIPPED | OUTPATIENT
Start: 2021-07-01 | End: 2021-08-03

## 2021-07-02 ENCOUNTER — TRANSCRIBE ORDERS (OUTPATIENT)
Dept: LAB | Facility: HOSPITAL | Age: 61
End: 2021-07-02

## 2021-07-02 DIAGNOSIS — Z01.818 PREOPERATIVE TESTING: Primary | ICD-10-CM

## 2021-07-05 ENCOUNTER — LAB (OUTPATIENT)
Dept: LAB | Facility: HOSPITAL | Age: 61
End: 2021-07-05

## 2021-07-05 DIAGNOSIS — Z01.818 PREOPERATIVE TESTING: ICD-10-CM

## 2021-07-05 LAB — SARS-COV-2 ORF1AB RESP QL NAA+PROBE: NOT DETECTED

## 2021-07-05 PROCEDURE — U0004 COV-19 TEST NON-CDC HGH THRU: HCPCS

## 2021-07-05 PROCEDURE — C9803 HOPD COVID-19 SPEC COLLECT: HCPCS

## 2021-07-05 PROCEDURE — U0005 INFEC AGEN DETEC AMPLI PROBE: HCPCS

## 2021-07-08 ENCOUNTER — HOSPITAL ENCOUNTER (OUTPATIENT)
Facility: HOSPITAL | Age: 61
Discharge: HOME | End: 2021-07-08
Attending: INTERNAL MEDICINE | Admitting: NURSE PRACTITIONER

## 2021-07-08 VITALS
RESPIRATION RATE: 19 BRPM | WEIGHT: 284 LBS | SYSTOLIC BLOOD PRESSURE: 151 MMHG | HEIGHT: 66 IN | TEMPERATURE: 96.8 F | BODY MASS INDEX: 45.64 KG/M2 | DIASTOLIC BLOOD PRESSURE: 79 MMHG | OXYGEN SATURATION: 97 % | HEART RATE: 65 BPM

## 2021-07-08 DIAGNOSIS — R93.1 ABNORMAL CARDIAC CT ANGIOGRAPHY: ICD-10-CM

## 2021-07-08 LAB
ALBUMIN SERPL-MCNC: 4.8 G/DL (ref 3.5–5.2)
ALBUMIN/GLOB SERPL: 1.2 G/DL
ALP SERPL-CCNC: 98 U/L (ref 39–117)
ALT SERPL W P-5'-P-CCNC: 26 U/L (ref 1–33)
ANION GAP SERPL CALCULATED.3IONS-SCNC: 12 MMOL/L (ref 5–15)
AST SERPL-CCNC: 24 U/L (ref 1–32)
BILIRUB SERPL-MCNC: 0.3 MG/DL (ref 0–1.2)
BUN SERPL-MCNC: 15 MG/DL (ref 8–23)
BUN/CREAT SERPL: 14.7 (ref 7–25)
CALCIUM SPEC-SCNC: 9.7 MG/DL (ref 8.6–10.5)
CHLORIDE SERPL-SCNC: 93 MMOL/L (ref 98–107)
CO2 SERPL-SCNC: 25 MMOL/L (ref 22–29)
CREAT SERPL-MCNC: 1.02 MG/DL (ref 0.57–1)
DEPRECATED RDW RBC AUTO: 42.2 FL (ref 37–54)
ERYTHROCYTE [DISTWIDTH] IN BLOOD BY AUTOMATED COUNT: 12.9 % (ref 12.3–15.4)
GFR SERPL CREATININE-BSD FRML MDRD: 55 ML/MIN/1.73
GLOBULIN UR ELPH-MCNC: 3.9 GM/DL
GLUCOSE SERPL-MCNC: 326 MG/DL (ref 65–99)
HCT VFR BLD AUTO: 41.2 % (ref 34–46.6)
HGB BLD-MCNC: 14.1 G/DL (ref 12–15.9)
MCH RBC QN AUTO: 30.9 PG (ref 26.6–33)
MCHC RBC AUTO-ENTMCNC: 34.2 G/DL (ref 31.5–35.7)
MCV RBC AUTO: 90.4 FL (ref 79–97)
PLATELET # BLD AUTO: 413 10*3/MM3 (ref 140–450)
PMV BLD AUTO: 9.7 FL (ref 6–12)
POTASSIUM SERPL-SCNC: 5 MMOL/L (ref 3.5–5.2)
PROT SERPL-MCNC: 8.7 G/DL (ref 6–8.5)
RBC # BLD AUTO: 4.56 10*6/MM3 (ref 3.77–5.28)
SODIUM SERPL-SCNC: 130 MMOL/L (ref 136–145)
WBC # BLD AUTO: 13.24 10*3/MM3 (ref 3.4–10.8)

## 2021-07-08 PROCEDURE — 25010000002 DIPHENHYDRAMINE PER 50 MG: Performed by: INTERNAL MEDICINE

## 2021-07-08 PROCEDURE — 99152 MOD SED SAME PHYS/QHP 5/>YRS: CPT | Performed by: INTERNAL MEDICINE

## 2021-07-08 PROCEDURE — 0 IOPAMIDOL PER 1 ML: Performed by: INTERNAL MEDICINE

## 2021-07-08 PROCEDURE — 93458 L HRT ARTERY/VENTRICLE ANGIO: CPT | Performed by: INTERNAL MEDICINE

## 2021-07-08 PROCEDURE — 25010000002 FENTANYL CITRATE (PF) 100 MCG/2ML SOLUTION: Performed by: INTERNAL MEDICINE

## 2021-07-08 PROCEDURE — C1769 GUIDE WIRE: HCPCS | Performed by: INTERNAL MEDICINE

## 2021-07-08 PROCEDURE — 80053 COMPREHEN METABOLIC PANEL: CPT | Performed by: NURSE PRACTITIONER

## 2021-07-08 PROCEDURE — A9270 NON-COVERED ITEM OR SERVICE: HCPCS

## 2021-07-08 PROCEDURE — C1894 INTRO/SHEATH, NON-LASER: HCPCS | Performed by: INTERNAL MEDICINE

## 2021-07-08 PROCEDURE — 93571 IV DOP VEL&/PRESS C FLO 1ST: CPT | Performed by: INTERNAL MEDICINE

## 2021-07-08 PROCEDURE — 99153 MOD SED SAME PHYS/QHP EA: CPT | Performed by: INTERNAL MEDICINE

## 2021-07-08 PROCEDURE — C1887 CATHETER, GUIDING: HCPCS | Performed by: INTERNAL MEDICINE

## 2021-07-08 PROCEDURE — 25010000002 HEPARIN (PORCINE) 1000-0.9 UT/500ML-% SOLUTION: Performed by: INTERNAL MEDICINE

## 2021-07-08 PROCEDURE — 63710000001 ASPIRIN 325 MG TABLET

## 2021-07-08 PROCEDURE — 85027 COMPLETE CBC AUTOMATED: CPT | Performed by: NURSE PRACTITIONER

## 2021-07-08 PROCEDURE — 25010000002 BIVALIRUDIN TRIFLUOROACETATE 250 MG RECONSTITUTED SOLUTION: Performed by: INTERNAL MEDICINE

## 2021-07-08 PROCEDURE — 25010000002 HEPARIN (PORCINE) 2000-0.9 UNIT/L-% SOLUTION: Performed by: INTERNAL MEDICINE

## 2021-07-08 PROCEDURE — 25010000002 MIDAZOLAM HCL (PF) 5 MG/5ML SOLUTION: Performed by: INTERNAL MEDICINE

## 2021-07-08 RX ORDER — ASPIRIN 325 MG
325 TABLET ORAL ONCE
Status: COMPLETED | OUTPATIENT
Start: 2021-07-08 | End: 2021-07-08

## 2021-07-08 RX ORDER — LIDOCAINE HYDROCHLORIDE 20 MG/ML
INJECTION, SOLUTION EPIDURAL; INFILTRATION; INTRACAUDAL; PERINEURAL AS NEEDED
Status: DISCONTINUED | OUTPATIENT
Start: 2021-07-08 | End: 2021-07-08 | Stop reason: HOSPADM

## 2021-07-08 RX ORDER — NITROGLYCERIN 0.4 MG/1
0.4 TABLET SUBLINGUAL
Status: DISCONTINUED | OUTPATIENT
Start: 2021-07-08 | End: 2021-07-08 | Stop reason: HOSPADM

## 2021-07-08 RX ORDER — MIDAZOLAM HYDROCHLORIDE 1 MG/ML
INJECTION, SOLUTION INTRAMUSCULAR; INTRAVENOUS AS NEEDED
Status: DISCONTINUED | OUTPATIENT
Start: 2021-07-08 | End: 2021-07-08 | Stop reason: HOSPADM

## 2021-07-08 RX ORDER — HEPARIN SODIUM 200 [USP'U]/100ML
INJECTION, SOLUTION INTRAVENOUS AS NEEDED
Status: DISCONTINUED | OUTPATIENT
Start: 2021-07-08 | End: 2021-07-08 | Stop reason: HOSPADM

## 2021-07-08 RX ORDER — ASPIRIN 325 MG
TABLET ORAL
Status: COMPLETED
Start: 2021-07-08 | End: 2021-07-08

## 2021-07-08 RX ORDER — DIPHENHYDRAMINE HCL 50 MG
50 CAPSULE ORAL ONCE
Status: DISCONTINUED | OUTPATIENT
Start: 2021-07-08 | End: 2021-07-08 | Stop reason: HOSPADM

## 2021-07-08 RX ORDER — ACETAMINOPHEN 325 MG/1
650 TABLET ORAL EVERY 4 HOURS PRN
Status: DISCONTINUED | OUTPATIENT
Start: 2021-07-08 | End: 2021-07-08 | Stop reason: HOSPADM

## 2021-07-08 RX ORDER — ASPIRIN 81 MG/1
81 TABLET ORAL DAILY
Status: DISCONTINUED | OUTPATIENT
Start: 2021-07-09 | End: 2021-07-08 | Stop reason: HOSPADM

## 2021-07-08 RX ORDER — SODIUM CHLORIDE 9 MG/ML
80 INJECTION, SOLUTION INTRAVENOUS CONTINUOUS
Status: DISCONTINUED | OUTPATIENT
Start: 2021-07-08 | End: 2021-07-08 | Stop reason: HOSPADM

## 2021-07-08 RX ORDER — SODIUM CHLORIDE 0.9 % (FLUSH) 0.9 %
10 SYRINGE (ML) INJECTION AS NEEDED
Status: DISCONTINUED | OUTPATIENT
Start: 2021-07-08 | End: 2021-07-08 | Stop reason: HOSPADM

## 2021-07-08 RX ORDER — FENTANYL CITRATE 50 UG/ML
INJECTION, SOLUTION INTRAMUSCULAR; INTRAVENOUS AS NEEDED
Status: DISCONTINUED | OUTPATIENT
Start: 2021-07-08 | End: 2021-07-08 | Stop reason: HOSPADM

## 2021-07-08 RX ORDER — SODIUM CHLORIDE 9 MG/ML
100 INJECTION, SOLUTION INTRAVENOUS CONTINUOUS
Status: DISCONTINUED | OUTPATIENT
Start: 2021-07-08 | End: 2021-07-08 | Stop reason: HOSPADM

## 2021-07-08 RX ORDER — SODIUM CHLORIDE 0.9 % (FLUSH) 0.9 %
3 SYRINGE (ML) INJECTION EVERY 12 HOURS SCHEDULED
Status: DISCONTINUED | OUTPATIENT
Start: 2021-07-08 | End: 2021-07-08 | Stop reason: HOSPADM

## 2021-07-08 RX ORDER — ONDANSETRON 2 MG/ML
4 INJECTION INTRAMUSCULAR; INTRAVENOUS EVERY 6 HOURS PRN
Status: DISCONTINUED | OUTPATIENT
Start: 2021-07-08 | End: 2021-07-08 | Stop reason: HOSPADM

## 2021-07-08 RX ORDER — DIPHENHYDRAMINE HYDROCHLORIDE 50 MG/ML
INJECTION INTRAMUSCULAR; INTRAVENOUS AS NEEDED
Status: DISCONTINUED | OUTPATIENT
Start: 2021-07-08 | End: 2021-07-08 | Stop reason: HOSPADM

## 2021-07-08 RX ADMIN — SODIUM CHLORIDE 100 ML/HR: 9 INJECTION, SOLUTION INTRAVENOUS at 10:49

## 2021-07-08 RX ADMIN — ASPIRIN 325 MG: 325 TABLET ORAL at 11:10

## 2021-07-08 RX ADMIN — Medication 325 MG: at 11:10

## 2021-07-08 RX ADMIN — SODIUM CHLORIDE, PRESERVATIVE FREE 3 ML: 5 INJECTION INTRAVENOUS at 11:10

## 2021-07-16 ENCOUNTER — TELEPHONE (OUTPATIENT)
Dept: CARDIOLOGY | Facility: CLINIC | Age: 61
End: 2021-07-16

## 2021-07-16 NOTE — TELEPHONE ENCOUNTER
----- Message from Josephine Shook CMA sent at 7/16/2021  9:24 AM CDT -----  Patient just had a heart cath and her return follow up was not scheduled from her last visit with you all looks like she wanted her to return in about 4 weeks.

## 2021-08-02 NOTE — PROGRESS NOTES
Chief Complaint  Coronary Artery Disease (S/P cath. No intervention) and Hypertension (Increased HCTZ LOV. States she was unable to function with increase. She is back to taking 12.5)    Subjective          Barbara Tapia presents to [facility] for routine follow-up of outpatient testing and medication adjustments.  She was started on an increased dose of HCTZ 25 mg daily at her last office visit on 7/1/2021 for uncontrolled hypertension. She reports increased fatigue with dose increase, which prompted her to return to 12.5 mg daily dosing. Left heart catheterization on 7/8/2021 revealed 50% stenosis of proximal LAD with normal RFR of 0.93 and no other significant epicardial coronary artery stenotic disease.  She has a history of nonobstructive coronary artery disease (40 to 50% mid LAD stenosis 4/19/2017), paroxysmal supraventricular tachycardia, type 2 diabetes mellitus, stage 3 chronic kidney disease, hypertension, hyperlipidemia, obstructive sleep apnea and morbid obesity. She continues to complain of intermittent, non-exertional, non-radiating substernal chest heaviness that occurs daily. She reports associated shortness of breath and nausea at times. The pain lasts anywhere from 15 minutes to an hour and a half and resolves on its own. Patient denies palpitations, dizziness, syncope, orthopnea, PND, edema or decreased stamina.  Patient denies any signs of bleeding. She also complains of a one week history of linq recorder site irritation, redness and green/yellow discharge.     Hypertension  This is a chronic problem. The current episode started more than 1 year ago. The problem is uncontrolled. Associated symptoms include chest pain and shortness of breath. Pertinent negatives include no anxiety, blurred vision, headaches, malaise/fatigue, neck pain, orthopnea, palpitations, peripheral edema, PND or sweats. Risk factors for coronary artery disease include obesity, post-menopausal  "state, diabetes mellitus and dyslipidemia. Current antihypertension treatment includes calcium channel blockers, beta blockers, angiotensin blockers and diuretics. The current treatment provides significant improvement. Hypertensive end-organ damage includes kidney disease.   Hyperlipidemia  This is a chronic problem. The current episode started more than 1 year ago. Exacerbating diseases include diabetes and obesity. Associated symptoms include chest pain and shortness of breath. Current antihyperlipidemic treatment includes statins. Risk factors for coronary artery disease include hypertension, obesity, post-menopausal, diabetes mellitus and dyslipidemia.       Objective   Vital Signs:   /84   Pulse 62   Ht 167.6 cm (66\")   Wt 129 kg (284 lb)   SpO2 96%   BMI 45.84 kg/m²     Vitals and nursing note reviewed.   Constitutional:       General: Not in acute distress.     Appearance: Normal and healthy appearance. Well-developed, normal weight and not in distress. Not diaphoretic.   Eyes:      General: Lids are normal.         Right eye: No discharge.         Left eye: No discharge.      Conjunctiva/sclera: Conjunctivae normal.      Pupils: Pupils are equal, round, and reactive to light.   HENT:      Head: Normocephalic and atraumatic.      Jaw: There is normal jaw occlusion.      Right Ear: External ear normal.      Left Ear: External ear normal.      Nose: Nose normal.   Neck:      Thyroid: No thyromegaly.      Vascular: No carotid bruit, JVD or JVR. JVD normal.      Trachea: Trachea normal. No tracheal deviation.   Pulmonary:      Effort: Pulmonary effort is normal. No respiratory distress.      Breath sounds: Normal breath sounds. No decreased breath sounds. No wheezing. No rhonchi. No rales.   Chest:      Chest wall: Not tender to palpatation.   Cardiovascular:      PMI at left midclavicular line. Normal rate. Regular rhythm. Normal S1. Normal S2.      Murmurs: There is no murmur.      No gallop. No " click. No rub.   Pulses:     Intact distal pulses. No decreased pulses.   Edema:     Peripheral edema absent.   Abdominal:      General: Bowel sounds are normal. There is no distension.      Palpations: Abdomen is soft.      Tenderness: There is no abdominal tenderness.   Musculoskeletal: Normal range of motion.         General: No tenderness or deformity.      Cervical back: Normal range of motion and neck supple. Skin:     General: Skin is warm and dry.      Coloration: Skin is not pale.      Findings: No erythema or rash.        Neurological:      General: No focal deficit present.      Mental Status: Alert, oriented to person, place, and time and oriented to person, place and time.   Psychiatric:         Attention and Perception: Attention and perception normal.         Mood and Affect: Mood and affect normal.         Speech: Speech normal.         Behavior: Behavior normal.         Thought Content: Thought content normal.         Cognition and Memory: Cognition and memory normal.         Judgment: Judgment normal.        Result Review :   The following data was reviewed by: ELTON Kimbrough on 05/17/2021:  Common labs    Common Labsle 5/17/21 5/28/21 7/8/21 7/8/21      1014 1014   Glucose 174 (A) 307 (A)  326 (A)   BUN 13 12  15   Creatinine 0.98 1.01 (A)  1.02 (A)   eGFR Non African Am 57 (A) 55 (A)  55 (A)   Sodium 135 (A) 130 (A)  130 (A)   Potassium 4.4 4.1  5.0   Chloride 96 (A) 90 (A)  93 (A)   Calcium 10.1 9.8  9.7   Albumin    4.80   Total Bilirubin    0.3   Alkaline Phosphatase    98   AST (SGOT)    24   ALT (SGPT)    26   WBC   13.24 (A)    Hemoglobin   14.1    Hematocrit   41.2    Platelets   413    (A) Abnormal value       Comments are available for some flowsheets but are not being displayed.           Data reviewed: Cardiology studies Stress echo 4/9/21, 2D echo 12/28/20 and holter monitor 1/21.           Assessment and Plan    Diagnoses and all orders for this visit:    1. Paroxysmal SVT  (supraventricular tachycardia) (CMS/[facility]) (Primary)-96 runs with longest duration of 1 minute 40 seconds at a maximum rate of 158 bpm on 14-day Holter monitor 11/30/2020.  Linq recorder inserted 4/9/21.  Continue flecainide.     2. Nonobstructive atherosclerosis of coronary artery-50% mid LAD stenosis on left heart catheterization 7/8/2021. Start Ranexa 500 mg twice daily.     3. Essential hypertension-blood pressure better controlled. Pt complains of increased fatigue with increase in HCTZ dose at last visit. She returned to taking HCTZ 12.5 mg daily. Continue HCTZ, losartan, metoprolol tartrate and amlodipine.  Monitor and record daily blood pressure. Report readings consistently higher than 140/90 or consistently lower than 100/60. Pt has been educated on risks of uncontrolled hypertension, including end organ damage I.e. heart and/or kidney failure. She verbalizes understanding.     4. Mixed hyperlipidemia-management per PCP.  Continue simvastatin.    5. Type 2 diabetes mellitus with hyperglycemia, with long-term current use of insulin (CMS/[facility])-management per PCP.    6. BLADIMIR treated with BiPAP-patient reports compliance with BiPAP.    7. Class 3 severe obesity due to excess calories with serious comorbidity and body mass index (BMI) of 45.0 to 49.9 in adult (CMS/[facility])- Patient's Body mass index is 45.84 kg/m². indicating that she is morbidly obese (BMI > 40 or > 35 with obesity - related health condition). Obesity-related health conditions include the following: obstructive sleep apnea, hypertension, coronary heart disease, diabetes mellitus and dyslipidemias. Obesity is unchanged. BMI is is above average; no BMI management plan is appropriate. We discussed low calorie, low carb based diet program, portion control and increasing exercise.    8. Stage 3 chronic kidney disease, unspecified whether stage 3a or 3b CKD (CMS/[facility])- Pt follows with Jim CONDE with nephrology.     Current outpatient and  discharge medications have been reconciled for the patient.  Reviewed by: ELTON Kimbrough    Follow Up   Return in about 4 weeks (around 8/31/2021) for Next scheduled follow up.  Patient was given instructions and counseling regarding her condition or for health maintenance advice. Please see specific information pulled into the AVS if appropriate.

## 2021-08-02 NOTE — PATIENT INSTRUCTIONS
Obesity, Adult  Obesity is the condition of having too much total body fat. Being overweight or obese means that your weight is greater than what is considered healthy for your body size. Obesity is determined by a measurement called BMI. BMI is an estimate of body fat and is calculated from height and weight. For adults, a BMI of 30 or higher is considered obese.  Obesity can lead to other health concerns and major illnesses, including:  · Stroke.  · Coronary artery disease (CAD).  · Type 2 diabetes.  · Some types of cancer, including cancers of the colon, breast, uterus, and gallbladder.  · Osteoarthritis.  · High blood pressure (hypertension).  · High cholesterol.  · Sleep apnea.  · Gallbladder stones.  · Infertility problems.  What are the causes?  Common causes of this condition include:  · Eating daily meals that are high in calories, sugar, and fat.  · Being born with genes that may make you more likely to become obese.  · Having a medical condition that causes obesity, including:  ? Hypothyroidism.  ? Polycystic ovarian syndrome (PCOS).  ? Binge-eating disorder.  ? Cushing syndrome.  · Taking certain medicines, such as steroids, antidepressants, and seizure medicines.  · Not being physically active (sedentary lifestyle).  · Not getting enough sleep.  · Drinking high amounts of sugar-sweetened beverages, such as soft drinks.  What increases the risk?  The following factors may make you more likely to develop this condition:  · Having a family history of obesity.  · Being a woman of  descent.  · Being a man of  descent.  · Living in an area with limited access to:  ? [place], recreation centers, or sidewalks.  ? Healthy food choices, such as grocery stores and farmers' markets.  What are the signs or symptoms?  The main sign of this condition is having too much body fat.  How is this diagnosed?  This condition is diagnosed based on:  · Your BMI. If you are an adult with a BMI of 30 or  higher, you are considered obese.  · Your waist circumference. This measures the distance around your waistline.  · Your skinfold thickness. Your health care provider may gently pinch a fold of your skin and measure it.  You may have other tests to check for underlying conditions.  How is this treated?  Treatment for this condition often includes changing your lifestyle. Treatment may include some or all of the following:  · Dietary changes. This may include developing a healthy meal plan.  · Regular physical activity. This may include activity that causes your heart to beat faster (aerobic exercise) and strength training. Work with your health care provider to design an exercise program that works for you.  · Medicine to help you lose weight if you are unable to lose 1 pound a week after 6 weeks of healthy eating and more physical activity.  · Treating conditions that cause the obesity (underlying conditions).  · Surgery. Surgical options may include gastric banding and gastric bypass. Surgery may be done if:  ? Other treatments have not helped to improve your condition.  ? You have a BMI of 40 or higher.  ? You have life-threatening health problems related to obesity.  Follow these instructions at home:  Eating and drinking    · Follow recommendations from your health care provider about what you eat and drink. Your health care provider may advise you to:  ? Limit fast food, sweets, and processed snack foods.  ? Choose low-fat options, such as low-fat milk instead of whole milk.  ? Eat 5 or more servings of fruits or vegetables every day.  ? Eat at home more often. This gives you more control over what you eat.  ? Choose healthy foods when you eat out.  ? Learn to read food labels. This will help you understand how much food is considered 1 serving.  ? Learn what a healthy serving size is.  ? Keep low-fat snacks available.  ? Limit sugary drinks, such as soda, fruit juice, sweetened iced tea, and flavored  milk.  · Drink enough water to keep your urine pale yellow.  · Do not follow a fad diet. Fad diets can be unhealthy and even dangerous.  Physical activity  · Exercise regularly, as told by your health care provider.  ? Most adults should get up to 150 minutes of moderate-intensity exercise every week.  ? Ask your health care provider what types of exercise are safe for you and how often you should exercise.  · Warm up and stretch before being active.  · Cool down and stretch after being active.  · Rest between periods of activity.  Lifestyle  · Work with your health care provider and a dietitian to set a weight-loss goal that is healthy and reasonable for you.  · Limit your screen time.  · Find ways to reward yourself that do not involve food.  · Do not drink alcohol if:  ? Your health care provider tells you not to drink.  ? You are pregnant, may be pregnant, or are planning to become pregnant.  · If you drink alcohol:  ? Limit how much you use to:  § 0-1 drink a day for women.  § 0-2 drinks a day for men.  ? Be aware of how much alcohol is in your drink. In the [place] one drink equals one 12 oz bottle of beer (355 mL), one 5 oz glass of wine (148 mL), or one 1½ oz glass of hard liquor (44 mL).  General instructions  · Keep a weight-loss journal to keep track of the food you eat and how much exercise you get.  · Take over-the-counter and prescription medicines only as told by your health care provider.  · Take vitamins and supplements only as told by your health care provider.  · Consider joining a support group. Your health care provider may be able to recommend a support group.  · Keep all follow-up visits as told by your health care provider. This is important.  Contact a health care provider if:  · You are unable to meet your weight loss goal after 6 weeks of dietary and lifestyle changes.  Get help right away if you are having:  · Trouble breathing.  · Suicidal thoughts or behaviors.  Summary  · Obesity is the  condition of having too much total body fat.  · Being overweight or obese means that your weight is greater than what is considered healthy for your body size.  · Work with your health care provider and a dietitian to set a weight-loss goal that is healthy and reasonable for you.  · Exercise regularly, as told by your health care provider. Ask your health care provider what types of exercise are safe for you and how often you should exercise.  This information is not intended to replace advice given to you by your health care provider. Make sure you discuss any questions you have with your health care provider.  Document Revised: 08/22/2019 Document Reviewed: 08/22/2019  [employer] Patient Education © 2021 [employer]

## 2021-08-03 ENCOUNTER — OFFICE VISIT (OUTPATIENT)
Dept: CARDIOLOGY | Facility: CLINIC | Age: 61
End: 2021-08-03

## 2021-08-03 VITALS
WEIGHT: 284 LBS | OXYGEN SATURATION: 96 % | BODY MASS INDEX: 45.64 KG/M2 | SYSTOLIC BLOOD PRESSURE: 132 MMHG | DIASTOLIC BLOOD PRESSURE: 84 MMHG | HEIGHT: 66 IN | HEART RATE: 62 BPM

## 2021-08-03 DIAGNOSIS — I47.1 PAROXYSMAL SVT (SUPRAVENTRICULAR TACHYCARDIA) (HCC): Primary | ICD-10-CM

## 2021-08-03 DIAGNOSIS — I25.10 NONOBSTRUCTIVE ATHEROSCLEROSIS OF CORONARY ARTERY: ICD-10-CM

## 2021-08-03 DIAGNOSIS — N18.30 STAGE 3 CHRONIC KIDNEY DISEASE, UNSPECIFIED WHETHER STAGE 3A OR 3B CKD (HCC): Chronic | ICD-10-CM

## 2021-08-03 DIAGNOSIS — Z79.4 TYPE 2 DIABETES MELLITUS WITH HYPERGLYCEMIA, WITH LONG-TERM CURRENT USE OF INSULIN (HCC): Chronic | ICD-10-CM

## 2021-08-03 DIAGNOSIS — E78.2 MIXED HYPERLIPIDEMIA: Chronic | ICD-10-CM

## 2021-08-03 DIAGNOSIS — E66.01 CLASS 3 SEVERE OBESITY DUE TO EXCESS CALORIES WITH SERIOUS COMORBIDITY AND BODY MASS INDEX (BMI) OF 45.0 TO 49.9 IN ADULT (HCC): ICD-10-CM

## 2021-08-03 DIAGNOSIS — E11.65 TYPE 2 DIABETES MELLITUS WITH HYPERGLYCEMIA, WITH LONG-TERM CURRENT USE OF INSULIN (HCC): Chronic | ICD-10-CM

## 2021-08-03 DIAGNOSIS — G47.33 OSA TREATED WITH BIPAP: Chronic | ICD-10-CM

## 2021-08-03 DIAGNOSIS — I10 ESSENTIAL HYPERTENSION: Chronic | ICD-10-CM

## 2021-08-03 PROCEDURE — 99214 OFFICE O/P EST MOD 30 MIN: CPT | Performed by: NURSE PRACTITIONER

## 2021-08-03 RX ORDER — RANOLAZINE 500 MG/1
500 TABLET, EXTENDED RELEASE ORAL 2 TIMES DAILY
Qty: 60 TABLET | Refills: 11 | Status: SHIPPED | OUTPATIENT
Start: 2021-08-03 | End: 2021-11-17

## 2021-09-01 ENCOUNTER — OFFICE VISIT (OUTPATIENT)
Dept: CARDIOLOGY | Facility: CLINIC | Age: 61
End: 2021-09-01

## 2021-09-01 VITALS
OXYGEN SATURATION: 94 % | HEART RATE: 65 BPM | DIASTOLIC BLOOD PRESSURE: 70 MMHG | HEIGHT: 66 IN | SYSTOLIC BLOOD PRESSURE: 122 MMHG | WEIGHT: 288 LBS | BODY MASS INDEX: 46.28 KG/M2

## 2021-09-01 DIAGNOSIS — I10 ESSENTIAL HYPERTENSION: Chronic | ICD-10-CM

## 2021-09-01 DIAGNOSIS — E11.65 TYPE 2 DIABETES MELLITUS WITH HYPERGLYCEMIA, WITH LONG-TERM CURRENT USE OF INSULIN (HCC): Chronic | ICD-10-CM

## 2021-09-01 DIAGNOSIS — I95.1 AUTONOMIC ORTHOSTATIC HYPOTENSION: Primary | ICD-10-CM

## 2021-09-01 DIAGNOSIS — Z79.4 TYPE 2 DIABETES MELLITUS WITH HYPERGLYCEMIA, WITH LONG-TERM CURRENT USE OF INSULIN (HCC): Chronic | ICD-10-CM

## 2021-09-01 DIAGNOSIS — R00.2 PALPITATION: ICD-10-CM

## 2021-09-01 DIAGNOSIS — I47.1 PAROXYSMAL SVT (SUPRAVENTRICULAR TACHYCARDIA) (HCC): ICD-10-CM

## 2021-09-01 DIAGNOSIS — E78.2 MIXED HYPERLIPIDEMIA: Chronic | ICD-10-CM

## 2021-09-01 PROCEDURE — 99214 OFFICE O/P EST MOD 30 MIN: CPT | Performed by: INTERNAL MEDICINE

## 2021-09-01 NOTE — PROGRESS NOTES
Barbara Tapia  [id number]  1960  61 y.o.  female    Referring Provider: Austin Ocampo MD    Reason for  Visit:      short term office follow up after recent encounter   Chest pain    Shortness of breath and palpitations  Now sees Dr Ocampo now as Dr Bartlett has retired  Cardiac workup test results as below : zio patch and echo   Cardiac cath Cardiac cath non obstructive coronary artery disease    Ranexa makes her weak and tired       Subjective    Mild chronic exertional shortness of breath on exertion relieved with rest  No significant cough or wheezing    No palpitations  No associated chest pain  No significant pedal edema    No fever or chills  No significant expectoration    No hemoptysis  No presyncope or syncope    Tolerating current medications well with no untoward side effects   Compliant with prescribed medication regimen. Tries to adhere to cardiac diet.     Chest pain at random and not necessarily related to exertion  Overall feels better and chest pain now improved     Joint pain in small, medium and large joints    Using BiPAP     No bleeding, excessive bruising, gait instability or fall risks    She tripped and fell going up incline   No syncope or syncope     BP well controlled at home.            History of present illness:  Barbara Tapia is a 61 y.o. yo female with history of essential Hypertension, Type 2 diabetes mellitus  Morbid obesity     who presents today for   Chief Complaint   Patient presents with   • Hypertension     6 wk f/u   • Fatigue     started Ranexa last office visit   .    History  Past Medical History:   Diagnosis Date   • Anxiety    • Anxiety    • Arthritis    • Asthma    • Back pain    • Cervicalgia 5/14/2020   • Chest pain    • Chronic fatigue    • Chronic kidney disease     Kidneys are only functioning 50 %   • Depression    • Diabetes (CMS/HCC)    • Diabetes mellitus (CMS/HCC)    • Dizzy    • Fibromyalgia    • Fibromyalgia    • Hiatal hernia    • Hypertension    •  Infection of skin and subcutaneous tissue due to fungus    • Knee contracture     total   • Palpitations    • Panic attacks    • PONV (postoperative nausea and vomiting)    • Sleep apnea    • Sleep apnea     BiPAP   • Vertigo    ,   Past Surgical History:   Procedure Laterality Date   • ANKLE FUSION Left    • ANKLE SURGERY Left     multiple surgeries   • ANTERIOR CERVICAL DISCECTOMY W/ FUSION N/A 2020    Procedure: ANTERIOR CERVICAL DISCECTOMY FUSION C5-7;  Surgeon: FLO Blanchard MD;  Location: [facility] OR;  Service: Orthopedic Spine;  Laterality: N/A;   • CARDIAC CATHETERIZATION Left 2017    Procedure: Cardiac Catheterization/Vascular Study;  Surgeon: Shane Singh MD;  Location:  [facility] CATH INVASIVE LOCATION;  Service:    • CARDIAC CATHETERIZATION N/A 2017    Procedure: Coronary angiography;  Surgeon: Shane Singh MD;  Location:  [facility]ATH [facility]IVE [facility];  Service:    • CARDIAC CATHETERIZATION N/A 2017    Procedure: Left Heart Cath;  Surgeon: Shane Singh MD;  Location:  [facility];  Service:    • CARDIAC CATHETERIZATION N/A 2021    Procedure: Left Heart Cath;  Surgeon: Shane Singh MD;  Location:  [facility]ATH [facility];  Service: Cardiology;  Laterality: N/A;   • CERVICAL FUSION      STITCHED UP TO PREVENT LOSING BABY   • CERVIX LESION DESTRUCTION     •  SECTION     • COLONOSCOPY     • COLONOSCOPY N/A 2017    Procedure: COLONOSCOPY WITH ANESTHESIA;  Surgeon: Raffaele uBchanan MD;  Location: [facility] ENDOSCOPY;  Service:    • ELBOW PROCEDURE Left     screws placed x 2   • ENDOSCOPY N/A 2017    Procedure: ESOPHAGOGASTRODUODENOSCOPY WITH ANESTHESIA;  Surgeon: Raffaele Buchanan MD;  Location: [facility] ENDOSCOPY;  Service:    • EXCISION MASS TRUNK      LEFT SIDE CAT SCRATCH FEVER    • EYE SURGERY      cataract removal sam   • REPLACEMENT TOTAL KNEE BILATERAL Bilateral    • ROTATOR CUFF REPAIR     • SINUS SURGERY     • SUBTOTAL HYSTERECTOMY       ovaries still intact   • TENNIS ELBOW RELEASE Right    ,   Family History   Problem Relation Age of Onset   • Heart disease Mother    • Obesity Mother    • Diabetes Mother    • Hypertension Mother    • Stroke Mother    • Heart disease Father    • Stroke Brother    • Heart disease Brother    • Colon cancer Neg Hx    • Colon polyps Neg Hx    ,   Social History     Tobacco Use   • Smoking status: Never Smoker   • Smokeless tobacco: Never Used   Vaping Use   • Vaping Use: Never used   Substance Use Topics   • Alcohol use: No   • Drug use: No   ,     Medications  Current Outpatient Medications   Medication Sig Dispense Refill   • albuterol (PROVENTIL HFA;VENTOLIN HFA) 108 (90 BASE) MCG/ACT inhaler 2 puffs 4 (Four) Times a Day.     • amLODIPine (NORVASC) 10 MG tablet Take 10 mg by mouth Daily.     • diazePAM (VALIUM) 5 MG tablet Take 5 mg by mouth Every 12 (Twelve) Hours As Needed.     • DULoxetine (CYMBALTA) 60 MG capsule Take 60 mg by mouth 2 (Two) Times a Day.     • flecainide (TAMBOCOR) 50 MG tablet Take 1 tablet by mouth 2 (Two) Times a Day. 60 tablet 11   • fluticasone (FLONASE) 50 MCG/ACT nasal spray 2 sprays into the nostril(s) as directed by provider Daily.     • glipiZIDE (GLUCOTROL) 10 MG tablet Take 10 mg by mouth 2 (Two) Times a Day Before Meals.     • hydroCHLOROthiazide (HYDRODIURIL) 25 MG tablet Take 1 tablet by mouth Daily. (Patient taking differently: Take 12.5 mg by mouth Daily.) 90 tablet 3   • insulin glargine (LANTUS) 100 UNIT/ML injection Inject 55 Units under the skin into the appropriate area as directed 2 (Two) Times a Day.     • insulin lispro (humaLOG) 100 UNIT/ML injection Inject  under the skin into the appropriate area as directed 3 (Three) Times a Day Before Meals.     • ketoconazole (NIZORAL) 200 MG tablet Daily As Needed.     • losartan (COZAAR) 100 MG tablet Take 100 mg by mouth Daily.     • meclizine (ANTIVERT) 12.5 MG tablet Take 12.5 mg by mouth 3 (Three) Times a Day As Needed.     •  "metoprolol tartrate (LOPRESSOR) 100 MG tablet Take 1 tablet by mouth 2 (Two) Times a Day. 180 tablet 4   • nystatin (MYCOSTATIN) 074005 UNIT/ML suspension Take 500,000 Units by mouth As Needed.     • ranolazine (Ranexa) 500 MG 12 hr tablet Take 1 tablet by mouth 2 (Two) Times a Day. 60 tablet 11   • simvastatin (ZOCOR) 10 MG tablet Take 10 mg by mouth Every Night.     • tiZANidine (ZANAFLEX) 4 MG tablet 4 mg 3 (Three) Times a Day As Needed for Muscle Spasms.     • traMADol (ULTRAM) 50 MG tablet Take 50 mg by mouth Every 6 (Six) Hours As Needed for Moderate Pain .     • vitamin D (ERGOCALCIFEROL) 1.25 MG (02812 UT) capsule capsule Take 50,000 Units by mouth 1 (One) Time Per Week.       No current facility-administered medications for this visit.       Allergies:  Azelastine-fluticasone, Contrast dye, Iodides, Metformin and related, Oxycodone-aspirin, Nisoldipine er, Pregabalin, Sular [nisoldipine], and Sulfa antibiotics    Review of Systems  Review of Systems   Constitutional: Positive for malaise/fatigue.   HENT: Negative.    Eyes: Negative.    Cardiovascular: Positive for dyspnea on exertion, leg swelling and syncope. Negative for chest pain, claudication, cyanosis, irregular heartbeat, near-syncope, orthopnea, palpitations and paroxysmal nocturnal dyspnea.   Respiratory: Negative.    Endocrine: Negative.    Hematologic/Lymphatic: Negative.    Skin: Negative.    Musculoskeletal: Positive for arthritis and back pain.   Gastrointestinal: Positive for dysphagia and heartburn. Negative for anorexia.   Genitourinary: Negative.    Neurological: Positive for dizziness and weakness.   Psychiatric/Behavioral: Negative.        Objective     Physical Exam:  /70   Pulse 65   Ht 167.6 cm (66\")   Wt 131 kg (288 lb)   LMP  (LMP Unknown)   SpO2 94%   BMI 46.48 kg/m²   Physical Exam   Constitutional: She appears well-developed.   HENT:   Head: Normocephalic.   Neck: Normal carotid pulses and no JVD present. No tracheal " tenderness present. Carotid bruit is not present. No tracheal deviation present.   Cardiovascular: Regular rhythm, normal heart sounds and normal pulses.   Pulmonary/Chest: Effort normal. No stridor.   Abdominal: Soft. She exhibits no distension. There is no abdominal tenderness.     Vascular Status -  Her right foot exhibits abnormal foot edema. Her left foot exhibits abnormal foot edema.  Neurological: She is alert. No cranial nerve deficit or sensory deficit.   Skin: Skin is warm.   Psychiatric: Her speech is normal and behavior is normal.       Results Review:      Conclusion 2021     50% stenosis of proximal left anterior descending coronary artery with normal RFR of 0.93  No other significant epicardial coronary artery stenotic disease  Mildly elevated left ventricular end-diastolic pressure of 16 mmHg        Plan        Keep LDL well below 70 mg/dL  Treat for elevated LVEDP such as treating hypertension losing weight salt restriction evaluation and treatment for obstructive sleep apnea  Vigorous oral hydration at home  IV hydration prior to discharge  Patient advised to follow-up BMP locally with primary care provider  Observation  Risk factor modifications             Results for orders placed during the hospital encounter of 21    Adult Stress Echo W/ Cont or Stress Agent if Necessary Per Protocol    Interpretation Summary  · Estimated left ventricular EF = 55% Left ventricular systolic function is normal.  · Due to effective beta-blockade patient did not achieve target heart rate. Achieved 76% of maximal predicted.  · Low risk stress test for stress induced myocardial ischemia       Barbara Tapia  Holter Monitor 72Hr-21Day (0296T/0298T)  Order# [id number]  Reading physician: Shane Singh MD Ordering physician: Shane Singh MD Study date: 20   Patient Information    Patient Name   Barbara Tapia MRN   [id number] Sex   Female  (Age)   1960 (60 y.o.)   Interpretation  Summary    · Entire report was reviewed.  Monitoring in days: ~14   · The predominant rhythm noted during the testing period was sinus rhythm.     · Rare supraventricular ectopics with an APC burden of: < 1%    · Rare premature ventricular contractions with a PVC burden of: < 1%     · No correlated arrhythmia  · No significant pauses                  Conclusion:      Baseline rhythm is sinus.  No significant ectopy   96 runs of supraventricular tachycardia longest 1 minute and 40 seconds at a maximum rate of 158 bpm and an average of 99 bpm          Zio patch     · Average HR: 64. Min HR: 45. Max HR: 143.     · The predominant rhythm noted during the testing period was sinus rhythm.  · Premature atrial and ventricular contractions occured as below.  · Total ectopy burden during the monitoring period; PVCs:  <1% and APCs: <1%                   · No significant supraventricular or ventricular tachy or kaitlynn arrhythmia.  · 28    supraventricular tachycardia episodes fastest 5    beats at maximum rate of    143    BPM   · No correlated arrhythmia  · No significant pauses  · Entire report was reviewed        Conclusion  2017  Non obstructive  coronary artery disease with dominant RCA  Normal LVEF  LVEDP   29    mm Hg     Plan     Hydration overnight   Nephrology consult  Observation  Risk factor modifications  Workup for non cardiac causes of chest pain        Procedures  ____________________________________________________________________________________________________________________________________________  Health maintenance and recommendations  Similar recommendations as last visit     Offered to give patient  a copy      Questions were encouraged, asked and answered to the patient's  understanding and satisfaction. Questions if any regarding current medications and side effects, need for refills and importance of compliance to medications stressed.    Reviewed available prior notes, consults, prior visits,  laboratory findings, radiology and cardiology relevant reports. Updated chart as applicable. I have reviewed the patient's medical history in detail and updated the computerized patient record as relevant.      Updated patient regarding any new or relevant abnormalities on review of records or any new findings on physical exam. Mentioned to patient about purpose of visit and desirable health short and long term goals and objectives.    Primary to monitor CBC CMP Lipid panel and TSH as applicable    ___________________________________________________________________________________________________________________________________________        Assessment/Plan   Patient Active Problem List   Diagnosis   • Palpitation   • Morbid obesity due to excess calories (CMS/[facility])   • BLADIMIR treated with BiPAP   • Other chest pain   • Essential hypertension   • Type 2 diabetes mellitus with hyperglycemia, with long-term current use of insulin (CMS/[facility])   • Mixed hyperlipidemia   • Anxiety and depression   • Other dysphagia   • Gastroesophageal reflux disease without esophagitis   • Encounter for screening for malignant neoplasm of colon   • Esophageal dysphagia   • Nonsmoker   • Obesity, unspecified obesity severity, unspecified obesity type   • Slow transit constipation   • Sleep apnea   • Class 3 severe obesity due to excess calories with serious comorbidity and body mass index (BMI) of 45.0 to 49.9 in adult (CMS/[facility])   • Autonomic orthostatic hypotension   • Cervicalgia   • Cervical radiculopathy due to degenerative joint disease of spine   • Nonobstructive atherosclerosis of coronary artery   • Paroxysmal SVT (supraventricular tachycardia) (CMS/[facility])   • CKD (chronic kidney disease) stage 3, GFR 30-59 ml/min (CMS/[facility])   • Abnormal cardiac CT angiography     Plan         Patient expressed understanding  Encouraged and answered all questions   Discussed with the patient and all questioned fully answered. She will call me if any  problems arise.   Discussed results of prior testing with patient including cath     OK to stop Ranexa     Overall doing well no new cardiovascular symptoms and therefore no additional cardiac testing is required   If any interim issues arise will call me for further evaluation.     Monitor for any signs of bleeding including red or dark stools as well as easy bruisabilty. Fall precautions.   Monitor cardiac rhythm device function regularly per established schedule or PRN      I support the patient's decision to take the Covid -19 vaccine   Had both dose   No major issues   Now fully immunized    Recommend booster        Patient was advised to continue BiPAP daily.     Check BP and heart rates twice daily at least 3x / week, week a month  at home and bring a recording for me to review next visit  If BP >130/85 or < 100/60 persistently over 3 reading 30 mins apart call sooner               Return in about 6 months (around 3/1/2022).

## 2021-11-17 ENCOUNTER — OFFICE VISIT (OUTPATIENT)
Dept: NEUROLOGY | Facility: CLINIC | Age: 61
End: 2021-11-17

## 2021-11-17 VITALS
WEIGHT: 287 LBS | HEART RATE: 66 BPM | RESPIRATION RATE: 20 BRPM | DIASTOLIC BLOOD PRESSURE: 72 MMHG | HEIGHT: 66 IN | BODY MASS INDEX: 46.12 KG/M2 | SYSTOLIC BLOOD PRESSURE: 134 MMHG | OXYGEN SATURATION: 97 %

## 2021-11-17 DIAGNOSIS — R42 DIZZINESS: ICD-10-CM

## 2021-11-17 DIAGNOSIS — G47.33 OSA TREATED WITH BIPAP: Primary | ICD-10-CM

## 2021-11-17 DIAGNOSIS — R55 SYNCOPE, UNSPECIFIED SYNCOPE TYPE: ICD-10-CM

## 2021-11-17 PROCEDURE — 99214 OFFICE O/P EST MOD 30 MIN: CPT | Performed by: NURSE PRACTITIONER

## 2021-11-17 RX ORDER — FAMOTIDINE 20 MG/1
TABLET, FILM COATED ORAL
COMMUNITY

## 2021-11-17 NOTE — PROGRESS NOTES
"    Neurology Consult Note    Referring Provider:   Shane Singh MD     Reason for Consultation:    Syncopal Episodes    Subjective     History of Present Illness:  Barbara Tapia is a 61 y.o. female who presents to the office today for evaluation of syncopal episodes.  She is routinely followed by Dr. Damaris MD for primary care and was referred to our office by Dr. Samantha MD.  She states that she has been having episodes of passing our for around 6 months now.  She has a very hard time describing these episodes to me at this time.  She also cannot relay to me how frequently they are occurring.  She has been following with cardiology and they have done multiple tests including cardiac catheterization.  She is on multiple cardiac medications which can affect her BP.  She will note to me that she will \"pass out\" while sitting on the couch and wake up some 4 hours or so later.  She is unsure of any events that precede this.  She notes her  can wake her up from this.  She also complains of \"passing out\" episodes while driving.  She notes that she has sleep apnea which is treated with BiPAP.  She states she has recently gotten a new machine but does not note that she has had a compliance visit or evaluation of treatment succusses for some time. Moreover, she notes frequent awakenings at night, non-restorative sleep, and daytime hypersomnolence which would concern me for under treated sleep apnea.  She additionally notes that she is dizzy quite frequently as well.  She has a diagnosis of vertigo and has been treated with meclizine for this.  She does note that quick position changes will exacerbate her symptoms.  She notes she will occasionally fall as well as a result of her dizziness.  She admits to some significant anxiety which will make her symptoms worse per her report.  She also feels as if her medications and her diabetes are also contributory.  Of note, she denies any witnesses to her syncopal episodes and " denies any tremulousness, tongue biting, or incontinence.  She has had multiple surgeries on multiple different joints and continues to have pain in many joints which can affect her strength. Either way, she has never had head imaging and reports no focal neurological deficits at this time.      Past Medical History:   Diagnosis Date   • Anxiety    • Anxiety    • Arthritis    • Asthma    • Back pain    • Cervicalgia 5/14/2020   • Chest pain    • Chronic fatigue    • Chronic kidney disease     Kidneys are only functioning 50 %   • Depression    • Diabetes (HCC)    • Diabetes mellitus (HCC)    • Dizzy    • Fibromyalgia    • Fibromyalgia    • Hiatal hernia    • Hypertension    • Infection of skin and subcutaneous tissue due to fungus    • Knee contracture     total   • Palpitations    • Panic attacks    • PONV (postoperative nausea and vomiting)    • Sleep apnea    • Sleep apnea     BiPAP   • Vertigo        Allergies   Allergen Reactions   • Azelastine-Fluticasone Itching   • Contrast Dye Nausea And Vomiting   • Iodides Nausea And Vomiting   • Metformin And Related Itching   • Oxycodone-Aspirin Nausea And Vomiting   • Nisoldipine Er Rash   • Pregabalin Rash   • Sular [Nisoldipine] Rash   • Sulfa Antibiotics Rash     Current Outpatient Medications on File Prior to Visit   Medication Sig   • albuterol (PROVENTIL HFA;VENTOLIN HFA) 108 (90 BASE) MCG/ACT inhaler 2 puffs 4 (Four) Times a Day.   • amLODIPine (NORVASC) 10 MG tablet Take 10 mg by mouth Daily.   • diazePAM (VALIUM) 5 MG tablet Take 5 mg by mouth Every 12 (Twelve) Hours As Needed.   • DULoxetine (CYMBALTA) 60 MG capsule Take 60 mg by mouth 2 (Two) Times a Day.   • famotidine (PEPCID) 20 MG tablet famotidine 20 mg tablet   Take 1 tablet every day by oral route for 2 days.   • flecainide (TAMBOCOR) 50 MG tablet Take 1 tablet by mouth 2 (Two) Times a Day.   • fluticasone (FLONASE) 50 MCG/ACT nasal spray 2 sprays into the nostril(s) as directed by provider Daily.   •  glipiZIDE (GLUCOTROL) 10 MG tablet Take 10 mg by mouth 2 (Two) Times a Day Before Meals.   • hydroCHLOROthiazide (HYDRODIURIL) 25 MG tablet Take 1 tablet by mouth Daily. (Patient taking differently: Take 12.5 mg by mouth Daily.)   • insulin glargine (LANTUS) 100 UNIT/ML injection Inject 55 Units under the skin into the appropriate area as directed 2 (Two) Times a Day.   • insulin lispro (humaLOG) 100 UNIT/ML injection Inject  under the skin into the appropriate area as directed 3 (Three) Times a Day Before Meals.   • ketoconazole (NIZORAL) 200 MG tablet Daily As Needed.   • losartan (COZAAR) 100 MG tablet Take 100 mg by mouth Daily.   • meclizine (ANTIVERT) 12.5 MG tablet Take 12.5 mg by mouth 3 (Three) Times a Day As Needed.   • metoprolol tartrate (LOPRESSOR) 100 MG tablet Take 1 tablet by mouth 2 (Two) Times a Day.   • nystatin (MYCOSTATIN) 678681 UNIT/ML suspension Take 500,000 Units by mouth As Needed.   • simvastatin (ZOCOR) 10 MG tablet Take 10 mg by mouth Every Night.   • tiZANidine (ZANAFLEX) 4 MG tablet 4 mg 3 (Three) Times a Day As Needed for Muscle Spasms.   • traMADol (ULTRAM) 50 MG tablet Take 50 mg by mouth Every 6 (Six) Hours As Needed for Moderate Pain .   • vitamin D (ERGOCALCIFEROL) 1.25 MG (63446 UT) capsule capsule Take 50,000 Units by mouth 1 (One) Time Per Week.   • [DISCONTINUED] ranolazine (Ranexa) 500 MG 12 hr tablet Take 1 tablet by mouth 2 (Two) Times a Day.     No current facility-administered medications on file prior to visit.     Current outpatient and discharge medications have been reconciled for the patient.  Reviewed by: ELTON Akbar      Family History   Problem Relation Age of Onset   • Heart disease Mother    • Obesity Mother    • Diabetes Mother    • Hypertension Mother    • Stroke Mother    • Heart disease Father    • Stroke Brother    • Heart disease Brother    • Colon cancer Neg Hx    • Colon polyps Neg Hx      Social History     Socioeconomic History   • Marital  status:    Tobacco Use   • Smoking status: Never Smoker   • Smokeless tobacco: Never Used   Vaping Use   • Vaping Use: Never used   Substance and Sexual Activity   • Alcohol use: No   • Drug use: No   • Sexual activity: Defer     Review of Systems  Review of Systems   Musculoskeletal: Positive for arthralgias and back pain. Negative for gait problem.   Neurological: Positive for dizziness, syncope and weakness. Negative for facial asymmetry, headache and memory problem.   Psychiatric/Behavioral: Positive for sleep disturbance and stress. The patient is nervous/anxious.    All other systems reviewed and are negative.      Objective      Vital Signs  Heart Rate:  [66] 66  Resp:  [20] 20  BP: (134)/(72) 134/72    Physical Examination:  Physical Exam  Constitutional:       Appearance: Normal appearance.   HENT:      Head: Normocephalic.   Eyes:      Extraocular Movements: Extraocular movements intact.      Pupils: Pupils are equal, round, and reactive to light.   Cardiovascular:      Rate and Rhythm: Normal rate and regular rhythm.      Pulses: Normal pulses.   Pulmonary:      Effort: Pulmonary effort is normal.   Musculoskeletal:         General: Normal range of motion.      Cervical back: Normal range of motion and neck supple.   Skin:     General: Skin is warm and dry.      Capillary Refill: Capillary refill takes less than 2 seconds.   Neurological:      Gait: Gait is intact.   Psychiatric:         Mood and Affect: Mood normal.       Neurologic Exam:  Mental Status:    -Awake. Alert. Oriented to person, place & time.  -No word finding difficulties.  -No aphasia.  -No dysarthria.  -Follows simple commands.     CN II:  Full visual fields with confrontation.  Pupils equally reactive to light.  CN III, IV, VI:  Extraocular muscles function intact with no nystagmus.  CN V:  Facial sensory is symmetric.  CN VII:  Facial motor symmetric.  CN VIII:  Gross hearing intact bilaterally.  CN IX/X:  Palate elevates  symmetrically.  CN XI:  Shoulder shrug symmetric.  CN XII:  Tongue is midline on protrusion.     Motor: (strength out of 5:  1= minimal movement, 2 = movement in plane of gravity, 3 = movement against gravity, 4 = movement against some resistance, 5 = full strength)     -4+/5 in bilateral biceps, triceps, brachioradialis, wrist extensors and intrinsic muscles of the hand.    -4+/5 in bilateral hip flexors, quadriceps, hamstrings, gastrocsoleus complex, anterior tibialis and extensor hallucis longus.       Deep Tendon Reflexes:  -Right              Biceps: 2+         Triceps: 2+      Brachioradialis: 2+              Patella: 2+       Ankle: 2+         Babinski:  negative  -Left              Biceps: 2+         Triceps: 2+      Brachioradialis: 2+              Patella: 2+       Ankle: 2+         Babinski:  negative    Tone (Modified Dorita Scale):  No appreciable increase in tone or rigidity noted.     Sensory:  -Intact to light touch, pinprick BUE (C5-T1) and BLE (L2-S1).     Coordination:  -Finger to nose intact BUEs  -Heel to shin intact BLEs  -No ataxia     Gait  -No signs of ataxia  -ambulates unassisted    Results Review:  Lab Results   Component Value Date    GLUCOSE 326 (H) 07/08/2021    BUN 15 07/08/2021    CREATININE 1.02 (H) 07/08/2021    EGFRIFNONA 55 (L) 07/08/2021    BCR 14.7 07/08/2021    K 5.0 07/08/2021    CO2 25.0 07/08/2021    CALCIUM 9.7 07/08/2021    ALBUMIN 4.80 07/08/2021    AST 24 07/08/2021    ALT 26 07/08/2021     Lab Results   Component Value Date    WBC 13.24 (H) 07/08/2021    HGB 14.1 07/08/2021    HCT 41.2 07/08/2021    MCV 90.4 07/08/2021     07/08/2021     No results found for: CHOL, CHLPL, TRIG, HDL, LDL, LDLDIRECT  No results found for: HGBA1C       Assessment/Plan     Impression:  • Syncope of undetermined cause  • Obstructive sleep apnea  • Daytime hypersomnolence  • Dizziness  • Diabetes Mellitus  • Anxiety  • Polypharmacy      Plan:  • I will obtain MRI brain.     • EEG to  rule out epileptic activity.  However, I do believe this is relatively low on the differential at this time.    • Overnight pulse oximetry to evaluate effectiveness of BiPAP treatment for her BLADIMIR.  She has not had this evaluated in some time per her report.  I am suspicious that some of these syncopal episodes are actually her falling asleep due to her underlying sleep apnea. She does complain of daytime hypersomnolence and non-restorative sleep which lead me to believe she is having under treated sleep apnea at this time. I will also reach out to her DME, "[employer], for compliance report.      • She needs to have strict follow-up and management of her anxiety.  I feel that she is having anxiety episodes which are exacerbating her symptoms at this time.      • Recommend regular physical activity and properly balanced diet and nutrition.      • I did discuss safety factors with the patient including with driving as she seems to be falling asleep while driving.  She states understanding.     The patient and I have discussed the plan of care and She is in full agreement at this time.     Follow-Up:  Return in about 6 weeks (around 12/29/2021) for Syncope.      Marvin Canales, APRN  11/17/21  11:38 CST

## 2021-11-30 ENCOUNTER — HOSPITAL ENCOUNTER (OUTPATIENT)
Dept: NEUROLOGY | Facility: HOSPITAL | Age: 61
Discharge: HOME | End: 2021-11-30
Admitting: NURSE PRACTITIONER

## 2021-11-30 DIAGNOSIS — R55 SYNCOPE, UNSPECIFIED SYNCOPE TYPE: ICD-10-CM

## 2021-11-30 PROCEDURE — 95819 EEG AWAKE AND ASLEEP: CPT | Performed by: PSYCHIATRY & NEUROLOGY

## 2021-11-30 PROCEDURE — 95819 EEG AWAKE AND ASLEEP: CPT

## 2021-12-02 ENCOUNTER — TELEPHONE (OUTPATIENT)
Dept: NEUROLOGY | Facility: CLINIC | Age: 61
End: 2021-12-02

## 2021-12-02 NOTE — TELEPHONE ENCOUNTER
I spoke with Mrs. Tapia and she understood there are no concerns from the EEG test done. She will be having her MRI done tomorrow and I advised her to call if any concerns. REA

## 2021-12-03 ENCOUNTER — HOSPITAL ENCOUNTER (OUTPATIENT)
Dept: MRI IMAGING | Facility: HOSPITAL | Age: 61
Discharge: HOME | End: 2021-12-03
Admitting: NURSE PRACTITIONER

## 2021-12-03 DIAGNOSIS — R55 SYNCOPE, UNSPECIFIED SYNCOPE TYPE: ICD-10-CM

## 2021-12-03 DIAGNOSIS — R42 DIZZINESS: ICD-10-CM

## 2021-12-03 PROCEDURE — 70551 MRI BRAIN STEM W/O DYE: CPT

## 2021-12-06 ENCOUNTER — TELEPHONE (OUTPATIENT)
Dept: NEUROLOGY | Facility: CLINIC | Age: 61
End: 2021-12-06

## 2021-12-06 NOTE — TELEPHONE ENCOUNTER
Spoke with Mrs. Tapia and advised her of the normal MRI results and she verbally voiced understanding and we would discuss this at her next visit as well.  REA

## 2021-12-17 RX ORDER — FLECAINIDE ACETATE 50 MG/1
TABLET ORAL
Qty: 180 TABLET | Refills: 4 | Status: SHIPPED | OUTPATIENT
Start: 2021-12-17 | End: 2022-12-31

## 2022-01-13 ENCOUNTER — TELEPHONE (OUTPATIENT)
Dept: NEUROLOGY | Facility: CLINIC | Age: 62
End: 2022-01-13

## 2022-01-13 PROCEDURE — 93298 REM INTERROG DEV EVAL SCRMS: CPT | Performed by: INTERNAL MEDICINE

## 2022-01-13 PROCEDURE — G2066 INTER DEVC REMOTE 30D: HCPCS | Performed by: INTERNAL MEDICINE

## 2022-01-13 NOTE — TELEPHONE ENCOUNTER
Erica with [facility] called and wanted to let Marvin know that they had to delete the overnight order because pt Barbara Tapia never came by.

## 2022-01-18 DIAGNOSIS — G47.33 OSA TREATED WITH BIPAP: Primary | ICD-10-CM

## 2022-02-01 DIAGNOSIS — G47.33 OSA TREATED WITH BIPAP: ICD-10-CM

## 2022-02-02 DIAGNOSIS — G47.33 OSA TREATED WITH BIPAP: ICD-10-CM

## 2022-03-08 RX ORDER — METOPROLOL TARTRATE 100 MG/1
100 TABLET ORAL 2 TIMES DAILY
Qty: 180 TABLET | Refills: 4 | Status: SHIPPED | OUTPATIENT
Start: 2022-03-08 | End: 2023-04-04

## 2022-04-27 ENCOUNTER — OFFICE VISIT (OUTPATIENT)
Dept: CARDIOLOGY | Facility: CLINIC | Age: 62
End: 2022-04-27

## 2022-04-27 VITALS
BODY MASS INDEX: 45.8 KG/M2 | HEART RATE: 59 BPM | HEIGHT: 66 IN | SYSTOLIC BLOOD PRESSURE: 145 MMHG | WEIGHT: 285 LBS | DIASTOLIC BLOOD PRESSURE: 80 MMHG

## 2022-04-27 DIAGNOSIS — I47.1 PAROXYSMAL SVT (SUPRAVENTRICULAR TACHYCARDIA): ICD-10-CM

## 2022-04-27 DIAGNOSIS — I10 ESSENTIAL HYPERTENSION: Chronic | ICD-10-CM

## 2022-04-27 DIAGNOSIS — E78.2 MIXED HYPERLIPIDEMIA: Chronic | ICD-10-CM

## 2022-04-27 DIAGNOSIS — I25.10 NONOBSTRUCTIVE ATHEROSCLEROSIS OF CORONARY ARTERY: Primary | ICD-10-CM

## 2022-04-27 DIAGNOSIS — E66.9 OBESITY, UNSPECIFIED OBESITY SEVERITY, UNSPECIFIED OBESITY TYPE: ICD-10-CM

## 2022-04-27 DIAGNOSIS — R06.09 DOE (DYSPNEA ON EXERTION): ICD-10-CM

## 2022-04-27 DIAGNOSIS — G47.33 OSA TREATED WITH BIPAP: Chronic | ICD-10-CM

## 2022-04-27 DIAGNOSIS — E66.01 MORBID OBESITY DUE TO EXCESS CALORIES: ICD-10-CM

## 2022-04-27 DIAGNOSIS — R13.19 ESOPHAGEAL DYSPHAGIA: ICD-10-CM

## 2022-04-27 DIAGNOSIS — R00.2 PALPITATION: ICD-10-CM

## 2022-04-27 PROCEDURE — 93000 ELECTROCARDIOGRAM COMPLETE: CPT | Performed by: INTERNAL MEDICINE

## 2022-04-27 PROCEDURE — 99214 OFFICE O/P EST MOD 30 MIN: CPT | Performed by: INTERNAL MEDICINE

## 2022-04-27 RX ORDER — ISOSORBIDE MONONITRATE 30 MG/1
30 TABLET, EXTENDED RELEASE ORAL EVERY MORNING
Qty: 90 TABLET | Refills: 3 | Status: SHIPPED | OUTPATIENT
Start: 2022-04-27

## 2022-04-27 NOTE — PROGRESS NOTES
Barbara Tapia  [id number]  1960  62 y.o.  female    Referring Provider: Austin Ocampo MD    Reason for  Visit:      short term office follow up after recent encounter   Chest pain    Shortness of breath and palpitations  Now sees Dr Ocampo now as Dr Bartlett has retired  Cardiac workup test results as below : zio patch and echo   Cardiac cath Cardiac cath non obstructive coronary artery disease      Subjective      Chest pain at random and not necessarily related to exertion  Non exertional chest pain     Mild chronic exertional shortness of breath on exertion relieved with rest  No significant cough or wheezing    No palpitations  No particular associated chest pain  No significant pedal edema    No fever or chills  No significant expectoration    No hemoptysis  No presyncope or syncope    Tolerating current medications well with no untoward side effects   Compliant with prescribed medication regimen. Tries to adhere to cardiac diet.   Joint pain in small, medium and large joints    Using BiPAP     No bleeding, excessive bruising, gait instability or fall risks    No syncope or syncope     BP well controlled at home.     Blood sugars not well controlled at home     obstructive sleep apnea on BiPAP   Device interrogations show no incriminating arrhythmia           History of present illness:  Barbara Tapia is a 62 y.o. yo female with essential Hypertension, Type 2 diabetes mellitus  Morbid obesity     who presents today for   Chief Complaint   Patient presents with   • Autonomic orthostatic hypotension     3 mo f/u   .    History  Past Medical History:   Diagnosis Date   • Anxiety    • Anxiety    • Arthritis    • Asthma    • Back pain    • Cervicalgia 5/14/2020   • Chest pain    • Chronic fatigue    • Chronic kidney disease     Kidneys are only functioning 50 %   • Depression    • Diabetes (HCC)    • Diabetes mellitus (HCC)    • Dizzy    • Fibromyalgia    • Fibromyalgia    • Hiatal hernia    • Hypertension    •  Infection of skin and subcutaneous tissue due to fungus    • Knee contracture     total   • Palpitations    • Panic attacks    • PONV (postoperative nausea and vomiting)    • Sleep apnea    • Sleep apnea     BiPAP   • Vertigo    ,   Past Surgical History:   Procedure Laterality Date   • ANKLE FUSION Left    • ANKLE SURGERY Left     multiple surgeries   • ANTERIOR CERVICAL DISCECTOMY W/ FUSION N/A 2020    Procedure: ANTERIOR CERVICAL DISCECTOMY FUSION C5-7;  Surgeon: FLO Blanchard MD;  Location: [facility] OR;  Service: Orthopedic Spine;  Laterality: N/A;   • CARDIAC CATHETERIZATION Left 2017    Procedure: Cardiac Catheterization/Vascular Study;  Surgeon: Shane Singh MD;  Location:  [facility] CATH INVASIVE LOCATION;  Service:    • CARDIAC CATHETERIZATION N/A 2017    Procedure: Coronary angiography;  Surgeon: Shane Singh MD;  Location:  [facility]ATH [facility];  Service:    • CARDIAC CATHETERIZATION N/A 2017    Procedure: Left Heart Cath;  Surgeon: Shane Singh MD;  Location:  [facility];  Service:    • CARDIAC CATHETERIZATION N/A 2021    Procedure: Left Heart Cath;  Surgeon: Shane Signh MD;  Location:  [facility]ATH [facility];  Service: Cardiology;  Laterality: N/A;   • CERVICAL FUSION      STITCHED UP TO PREVENT LOSING BABY   • CERVIX LESION DESTRUCTION     •  SECTION     • COLONOSCOPY     • COLONOSCOPY N/A 2017    Procedure: COLONOSCOPY WITH ANESTHESIA;  Surgeon: Raffaele Buchanan MD;  Location: [facility] ENDOSCOPY;  Service:    • ELBOW PROCEDURE Left     screws placed x 2   • ENDOSCOPY N/A 2017    Procedure: ESOPHAGOGASTRODUODENOSCOPY WITH ANESTHESIA;  Surgeon: Raffaele Buchanan MD;  Location: [facility] ENDOSCOPY;  Service:    • EXCISION MASS TRUNK      LEFT SIDE CAT SCRATCH FEVER    • EYE SURGERY      cataract removal sam   • REPLACEMENT TOTAL KNEE BILATERAL Bilateral    • ROTATOR CUFF REPAIR     • SINUS SURGERY     • SUBTOTAL HYSTERECTOMY       ovaries still intact   • TENNIS ELBOW RELEASE Right    ,   Family History   Problem Relation Age of Onset   • Heart disease Mother    • Obesity Mother    • Diabetes Mother    • Hypertension Mother    • Stroke Mother    • Heart disease Father    • Stroke Brother    • Heart disease Brother    • Colon cancer Neg Hx    • Colon polyps Neg Hx    ,   Social History     Tobacco Use   • Smoking status: Never Smoker   • Smokeless tobacco: Never Used   Vaping Use   • Vaping Use: Never used   Substance Use Topics   • Alcohol use: No   • Drug use: No   ,     Medications  Current Outpatient Medications   Medication Sig Dispense Refill   • albuterol (PROVENTIL HFA;VENTOLIN HFA) 108 (90 BASE) MCG/ACT inhaler 2 puffs 4 (Four) Times a Day.     • amLODIPine (NORVASC) 10 MG tablet Take 10 mg by mouth Daily.     • diazePAM (VALIUM) 5 MG tablet Take 5 mg by mouth Every 12 (Twelve) Hours As Needed.     • DULoxetine (CYMBALTA) 60 MG capsule Take 60 mg by mouth 2 (Two) Times a Day.     • flecainide (TAMBOCOR) 50 MG tablet TAKE 1 TABLET BY MOUTH TWO TIMES A DAY. 180 tablet 4   • fluticasone (FLONASE) 50 MCG/ACT nasal spray 2 sprays into the nostril(s) as directed by provider Daily.     • hydroCHLOROthiazide (HYDRODIURIL) 25 MG tablet Take 1 tablet by mouth Daily. (Patient taking differently: Take 12.5 mg by mouth Daily.) 90 tablet 3   • insulin glargine (LANTUS) 100 UNIT/ML injection Inject 55 Units under the skin into the appropriate area as directed 2 (Two) Times a Day.     • insulin lispro (humaLOG) 100 UNIT/ML injection Inject  under the skin into the appropriate area as directed 3 (Three) Times a Day Before Meals.     • ketoconazole (NIZORAL) 200 MG tablet Daily As Needed.     • losartan (COZAAR) 100 MG tablet Take 100 mg by mouth Daily.     • meclizine (ANTIVERT) 12.5 MG tablet Take 12.5 mg by mouth 3 (Three) Times a Day As Needed.     • metoprolol tartrate (LOPRESSOR) 100 MG tablet TAKE 1 TABLET BY MOUTH 2 (TWO) TIMES A DAY. 180  "tablet 4   • nystatin (MYCOSTATIN) 329725 UNIT/ML suspension Take 500,000 Units by mouth As Needed.     • simvastatin (ZOCOR) 10 MG tablet Take 10 mg by mouth Every Night.     • tiZANidine (ZANAFLEX) 4 MG tablet 4 mg 3 (Three) Times a Day As Needed for Muscle Spasms.     • traMADol (ULTRAM) 50 MG tablet Take 50 mg by mouth Every 6 (Six) Hours As Needed for Moderate Pain .     • famotidine (PEPCID) 20 MG tablet famotidine 20 mg tablet   Take 1 tablet every day by oral route for 2 days.     • glipiZIDE (GLUCOTROL) 10 MG tablet Take 10 mg by mouth 2 (Two) Times a Day Before Meals.     • isosorbide mononitrate (IMDUR) 30 MG 24 hr tablet Take 1 tablet by mouth Every Morning. 90 tablet 3   • vitamin D (ERGOCALCIFEROL) 1.25 MG (29464 UT) capsule capsule Take 50,000 Units by mouth 1 (One) Time Per Week.       No current facility-administered medications for this visit.       Allergies:  Azelastine-fluticasone, Contrast dye, Iodides, Metformin and related, Oxycodone-aspirin, Nisoldipine er, Pregabalin, Sular [nisoldipine], and Sulfa antibiotics    Review of Systems  Review of Systems   Constitutional: Positive for malaise/fatigue.   HENT: Negative.    Eyes: Negative.    Cardiovascular: Positive for chest pain, dyspnea on exertion, leg swelling and syncope. Negative for claudication, cyanosis, irregular heartbeat, near-syncope, orthopnea, palpitations and paroxysmal nocturnal dyspnea.   Respiratory: Negative.    Endocrine: Negative.    Hematologic/Lymphatic: Negative.    Skin: Negative.    Musculoskeletal: Positive for arthritis and back pain.   Gastrointestinal: Positive for dysphagia and heartburn. Negative for anorexia.   Genitourinary: Negative.    Neurological: Positive for dizziness and weakness.   Psychiatric/Behavioral: Negative.        Objective     Physical Exam:  /80   Pulse 59   Ht 167.6 cm (66\")   Wt 129 kg (285 lb)   LMP  (LMP Unknown)   BMI 46.00 kg/m²   Physical Exam   Constitutional: She appears " well-developed.   HENT:   Head: Normocephalic.   Neck: Normal carotid pulses and no JVD present. No tracheal tenderness present. Carotid bruit is not present. No tracheal deviation present.   Cardiovascular: Regular rhythm, normal heart sounds and normal pulses.   Pulmonary/Chest: Effort normal. No stridor.   Abdominal: Soft. She exhibits no distension. There is no abdominal tenderness.     Vascular Status -  Her right foot exhibits abnormal foot edema. Her left foot exhibits abnormal foot edema.  Neurological: She is alert. No cranial nerve deficit or sensory deficit.   Skin: Skin is warm.   Psychiatric: Her speech is normal and behavior is normal.       Results Review:        Barbara Tapia  Echo Complete w/ Doppler, Color Flow and Strain  Order# [id number]  Reading physician: Shane Singh MD Ordering physician: Shane Singh MD Study date: 20       Patient Information    Patient Name   Barbara Tapia MRN   [id number] Legal Sex   Female  (Age)   1960 (62 y.o.)     PACS Images     Show images for Adult Transthoracic Echo Complete W/ Cont if Necessary Per Protocol   Sedation Narrator Report    Sedation Narrator Report        Interpretation Summary    · Left ventricular ejection fraction appears to be 56 - 60%.  · Abnormal global longitudinal LV strain (GLS) = -13%  · Left ventricular diastolic function was normal.  · No evidence of pulmonary hypertension is present.         Barbara Tapia  Echo Complete w/Doppler and Color Flow  Order# [id number]  Reading physician: Shane Singh MD Ordering physician: Shane Singh MD Study date: 17       Patient Information    Patient Name   Barbara Tapia MRN   [id number] Legal Sex   Female  (Age)   1960 (62 y.o.)     PACS Images     Show images for Adult Transthoracic Echo Complete   Sedation Narrator Report    Sedation Narrator Report        Interpretation Summary    · Left ventricular function is normal. Estimated EF = 60%.  · Left ventricular diastolic  dysfunction (grade I) consistent with impaired relaxation.  · Estimated right ventricular systolic pressure from tricuspid regurgitation is normal (<35 mmHg).            Results for orders placed during the hospital encounter of 21    Adult Stress Echo W/ Cont or Stress Agent if Necessary Per Protocol    Interpretation Summary  · Estimated left ventricular EF = 55% Left ventricular systolic function is normal.  · Due to effective beta-blockade patient did not achieve target heart rate. Achieved 76% of maximal predicted.  · Low risk stress test for stress induced myocardial ischemia    Conclusion 2021     50% stenosis of proximal left anterior descending coronary artery with normal RFR of 0.93  No other significant epicardial coronary artery stenotic disease  Mildly elevated left ventricular end-diastolic pressure of 16 mmHg        Plan        Keep LDL well below 70 mg/dL  Treat for elevated LVEDP such as treating hypertension losing weight salt restriction evaluation and treatment for obstructive sleep apnea  Vigorous oral hydration at home  IV hydration prior to discharge  Patient advised to follow-up BMP locally with primary care provider  Observation  Risk factor modifications             Results for orders placed during the hospital encounter of 21    Adult Stress Echo W/ Cont or Stress Agent if Necessary Per Protocol    Interpretation Summary  · Estimated left ventricular EF = 55% Left ventricular systolic function is normal.  · Due to effective beta-blockade patient did not achieve target heart rate. Achieved 76% of maximal predicted.  · Low risk stress test for stress induced myocardial ischemia       Barbara Tapia  Holter Monitor 72Hr-21Day (0296T/0298T)  Order# [id number]  Reading physician: Shane Singh MD Ordering physician: Shane Singh MD Study date: 20   Patient Information    Patient Name   Barbara Tapia MRN   [id number] Sex   Female  (Age)   1960 (60 y.o.)    Interpretation Summary    · Entire report was reviewed.  Monitoring in days: ~14   · The predominant rhythm noted during the testing period was sinus rhythm.     · Rare supraventricular ectopics with an APC burden of: < 1%    · Rare premature ventricular contractions with a PVC burden of: < 1%     · No correlated arrhythmia  · No significant pauses                  Conclusion:      Baseline rhythm is sinus.  No significant ectopy   96 runs of supraventricular tachycardia longest 1 minute and 40 seconds at a maximum rate of 158 bpm and an average of 99 bpm          Zio patch     · Average HR: 64. Min HR: 45. Max HR: 143.     · The predominant rhythm noted during the testing period was sinus rhythm.  · Premature atrial and ventricular contractions occured as below.  · Total ectopy burden during the monitoring period; PVCs:  <1% and APCs: <1%                   · No significant supraventricular or ventricular tachy or kaitlynn arrhythmia.  · 28    supraventricular tachycardia episodes fastest 5    beats at maximum rate of    143    BPM   · No correlated arrhythmia  · No significant pauses  · Entire report was reviewed        Conclusion  2017  Non obstructive  coronary artery disease with dominant RCA  Normal LVEF  LVEDP   29    mm Hg     Plan     Hydration overnight   Nephrology consult  Observation  Risk factor modifications  Workup for non cardiac causes of chest pain          ECG 12 Lead    Date/Time: 4/27/2022 9:56 AM  Performed by: Shane Singh MD  Authorized by: Shane Singh MD   Comparison: compared with previous ECG from 5/17/2021  Similar to previous ECG  Comparison to previous ECG: Ventricular rate changed from   58   to 59   beats per minute    Rhythm: sinus bradycardia  Rate: bradycardic  Conduction: conduction normal  ST Segments: ST segments normal  T Waves: T waves normal  QRS axis: normal  Other: no other findings    Clinical impression: abnormal  EKG          ____________________________________________________________________________________________________________________________________________  Health maintenance and recommendations  Similar recommendations as last visit     Offered to give patient  a copy      Questions were encouraged, asked and answered to the patient's  understanding and satisfaction. Questions if any regarding current medications and side effects, need for refills and importance of compliance to medications stressed.    Reviewed available prior notes, consults, prior visits, laboratory findings, radiology and cardiology relevant reports. Updated chart as applicable. I have reviewed the patient's medical history in detail and updated the computerized patient record as relevant.      Updated patient regarding any new or relevant abnormalities on review of records or any new findings on physical exam. Mentioned to patient about purpose of visit and desirable health short and long term goals and objectives.    Primary to monitor CBC CMP Lipid panel and TSH as applicable    ___________________________________________________________________________________________________________________________________________      Diagnoses and all orders for this visit:    1. Nonobstructive atherosclerosis of coronary artery (Primary)    2. Morbid obesity due to excess calories (HCC)    3. Mixed hyperlipidemia    4. Essential hypertension    5. Esophageal dysphagia    6. Obesity, unspecified obesity severity, unspecified obesity type    7. BLADIMIR treated with BiPAP    8. Palpitation    9. Paroxysmal SVT (supraventricular tachycardia) (HCC)    10. NICHOLAS (dyspnea on exertion)  -     Adult Transthoracic Echo Complete w/ Color, Spectral and Contrast if necessary per protocol; Future    Other orders  -     isosorbide mononitrate (IMDUR) 30 MG 24 hr tablet; Take 1 tablet by mouth Every Morning.  Dispense: 90 tablet; Refill: 3           Plan      Likely has  microvascular disease and will add long acting nitrates   Requested Prescriptions     Signed Prescriptions Disp Refills   • isosorbide mononitrate (IMDUR) 30 MG 24 hr tablet 90 tablet 3     Sig: Take 1 tablet by mouth Every Morning.      Check BP and heart rates twice daily initially till blood pressures and heart rates under good control and then at least 3x / week,   If blood pressures continue to be well-controlled then can check week a month  at home and bring a recording for review next visit  If BP >130/85 or < 100/60 persistently over 3 reading 30 mins apart or if heart rates persistently above 100 bpm or less than 55 bpm call sooner for evaluation and advise     Orders Placed This Encounter   Procedures   • Adult Transthoracic Echo Complete w/ Color, Spectral and Contrast if necessary per protocol     Myocardial strain to be performed during echocardiogram as long as technically feasible     Standing Status:   Future     Standing Expiration Date:   4/27/2023     Order Specific Question:   Reason for exam?     Answer:   Dyspnea     Order Specific Question:   Reason for exam?     Answer:   Chest Pain     Order Specific Question:   Release to patient     Answer:   Immediate      Patient expressed understanding  Encouraged and answered all questions   Discussed with the patient and all questioned fully answered. She will call me if any problems arise.   Discussed results of prior testing with patient including cath     OK to stop Ranexa     Overall doing well no new cardiovascular symptoms and therefore no additional cardiac testing is required   If any interim issues arise will call me for further evaluation.     Monitor for any signs of bleeding including red or dark stools as well as easy bruisabilty. Fall precautions.   Monitor cardiac rhythm device function regularly per established schedule or PRN      I support the patient's decision to take the Covid -19 vaccine   Had both dose   No major issues   Now  fully immunized    Recommend booster        Patient was advised to continue BiPAP daily.   Follow up with Venecia CONDE , Flaco CONDE  or myself           Return in about 4 months (around 8/27/2022).

## 2022-06-17 ENCOUNTER — HOSPITAL ENCOUNTER (OUTPATIENT)
Dept: CARDIOLOGY | Facility: HOSPITAL | Age: 62
Discharge: HOME | End: 2022-06-17
Admitting: INTERNAL MEDICINE

## 2022-06-17 VITALS
DIASTOLIC BLOOD PRESSURE: 65 MMHG | WEIGHT: 285 LBS | HEIGHT: 66 IN | BODY MASS INDEX: 45.8 KG/M2 | SYSTOLIC BLOOD PRESSURE: 122 MMHG

## 2022-06-17 DIAGNOSIS — R06.09 DOE (DYSPNEA ON EXERTION): ICD-10-CM

## 2022-06-17 PROCEDURE — 93356 MYOCRD STRAIN IMG SPCKL TRCK: CPT

## 2022-06-17 PROCEDURE — 93356 MYOCRD STRAIN IMG SPCKL TRCK: CPT | Performed by: INTERNAL MEDICINE

## 2022-06-17 PROCEDURE — 93306 TTE W/DOPPLER COMPLETE: CPT

## 2022-06-17 PROCEDURE — 93306 TTE W/DOPPLER COMPLETE: CPT | Performed by: INTERNAL MEDICINE

## 2022-06-19 LAB
BH CV ECHO LEFT VENTRICLE GLOBAL LONGITUDINAL STRAIN: -11 %
BH CV ECHO MEAS - AO MAX PG: 7.6 MMHG
BH CV ECHO MEAS - AO MEAN PG: 4 MMHG
BH CV ECHO MEAS - AO ROOT DIAM: 2.9 CM
BH CV ECHO MEAS - AO V2 MAX: 138 CM/SEC
BH CV ECHO MEAS - AO V2 VTI: 34.5 CM
BH CV ECHO MEAS - AVA(I,D): 2.3 CM2
BH CV ECHO MEAS - EDV(CUBED): 106.5 ML
BH CV ECHO MEAS - EDV(MOD-SP4): 64.6 ML
BH CV ECHO MEAS - EF(MOD-SP4): 60.7 %
BH CV ECHO MEAS - ESV(CUBED): 22.4 ML
BH CV ECHO MEAS - ESV(MOD-SP4): 25.4 ML
BH CV ECHO MEAS - FS: 40.5 %
BH CV ECHO MEAS - IVS/LVPW: 1.45 CM
BH CV ECHO MEAS - IVSD: 1 CM
BH CV ECHO MEAS - LA DIMENSION: 4.2 CM
BH CV ECHO MEAS - LAT PEAK E' VEL: 10.9 CM/SEC
BH CV ECHO MEAS - LV DIASTOLIC VOL/BSA (35-75): 27.8 CM2
BH CV ECHO MEAS - LV MASS(C)D: 133.2 GRAMS
BH CV ECHO MEAS - LV MAX PG: 4.5 MMHG
BH CV ECHO MEAS - LV MEAN PG: 3 MMHG
BH CV ECHO MEAS - LV SYSTOLIC VOL/BSA (12-30): 10.9 CM2
BH CV ECHO MEAS - LV V1 MAX: 106 CM/SEC
BH CV ECHO MEAS - LV V1 VTI: 25.3 CM
BH CV ECHO MEAS - LVIDD: 4.7 CM
BH CV ECHO MEAS - LVIDS: 2.8 CM
BH CV ECHO MEAS - LVOT AREA: 3.1 CM2
BH CV ECHO MEAS - LVOT DIAM: 2 CM
BH CV ECHO MEAS - LVPWD: 0.69 CM
BH CV ECHO MEAS - MED PEAK E' VEL: 5.2 CM/SEC
BH CV ECHO MEAS - MV A MAX VEL: 69.8 CM/SEC
BH CV ECHO MEAS - MV DEC TIME: 0.37 MSEC
BH CV ECHO MEAS - MV E MAX VEL: 69.4 CM/SEC
BH CV ECHO MEAS - MV E/A: 0.99
BH CV ECHO MEAS - PA V2 MAX: 81 CM/SEC
BH CV ECHO MEAS - SI(MOD-SP4): 16.9 ML/M2
BH CV ECHO MEAS - SV(LVOT): 79.5 ML
BH CV ECHO MEAS - SV(MOD-SP4): 39.2 ML
BH CV ECHO MEAS - TR MAX VEL: 100 CM/SEC
BH CV ECHO MEASUREMENTS AVERAGE E/E' RATIO: 8.62
LEFT ATRIUM VOLUME INDEX: 24.9 ML/M2
LEFT ATRIUM VOLUME: 58.1 ML
MAXIMAL PREDICTED HEART RATE: 156 BPM
STRESS TARGET HR: 133 BPM

## 2022-07-07 RX ORDER — HYDROCHLOROTHIAZIDE 25 MG/1
12.5 TABLET ORAL DAILY
Qty: 90 TABLET | Refills: 3 | Status: SHIPPED | OUTPATIENT
Start: 2022-07-07

## 2022-08-18 PROCEDURE — 93298 REM INTERROG DEV EVAL SCRMS: CPT | Performed by: INTERNAL MEDICINE

## 2022-08-18 PROCEDURE — G2066 INTER DEVC REMOTE 30D: HCPCS | Performed by: INTERNAL MEDICINE

## 2022-12-31 RX ORDER — FLECAINIDE ACETATE 50 MG/1
TABLET ORAL
Qty: 180 TABLET | Refills: 4 | Status: SHIPPED | OUTPATIENT
Start: 2022-12-31

## 2023-03-24 ENCOUNTER — OFFICE VISIT (OUTPATIENT)
Dept: CARDIOLOGY | Facility: CLINIC | Age: 63
End: 2023-03-24
Payer: MEDICARE

## 2023-03-24 VITALS
HEART RATE: 56 BPM | OXYGEN SATURATION: 96 % | BODY MASS INDEX: 44.52 KG/M2 | SYSTOLIC BLOOD PRESSURE: 148 MMHG | HEIGHT: 66 IN | DIASTOLIC BLOOD PRESSURE: 74 MMHG | WEIGHT: 277 LBS

## 2023-03-24 DIAGNOSIS — I10 ESSENTIAL HYPERTENSION: Chronic | ICD-10-CM

## 2023-03-24 DIAGNOSIS — R93.1 ABNORMAL CARDIAC CT ANGIOGRAPHY: Primary | ICD-10-CM

## 2023-03-24 DIAGNOSIS — E11.65 TYPE 2 DIABETES MELLITUS WITH HYPERGLYCEMIA, WITH LONG-TERM CURRENT USE OF INSULIN: Chronic | ICD-10-CM

## 2023-03-24 DIAGNOSIS — R13.19 OTHER DYSPHAGIA: ICD-10-CM

## 2023-03-24 DIAGNOSIS — I95.1 AUTONOMIC ORTHOSTATIC HYPOTENSION: ICD-10-CM

## 2023-03-24 DIAGNOSIS — Z79.4 TYPE 2 DIABETES MELLITUS WITH HYPERGLYCEMIA, WITH LONG-TERM CURRENT USE OF INSULIN: Chronic | ICD-10-CM

## 2023-03-24 DIAGNOSIS — G47.33 OSA TREATED WITH BIPAP: Chronic | ICD-10-CM

## 2023-03-24 DIAGNOSIS — I47.1 PAROXYSMAL SVT (SUPRAVENTRICULAR TACHYCARDIA): ICD-10-CM

## 2023-03-24 PROCEDURE — 3077F SYST BP >= 140 MM HG: CPT | Performed by: INTERNAL MEDICINE

## 2023-03-24 PROCEDURE — 3078F DIAST BP <80 MM HG: CPT | Performed by: INTERNAL MEDICINE

## 2023-03-24 PROCEDURE — 1159F MED LIST DOCD IN RCRD: CPT | Performed by: INTERNAL MEDICINE

## 2023-03-24 PROCEDURE — 1160F RVW MEDS BY RX/DR IN RCRD: CPT | Performed by: INTERNAL MEDICINE

## 2023-03-24 PROCEDURE — 99214 OFFICE O/P EST MOD 30 MIN: CPT | Performed by: INTERNAL MEDICINE

## 2023-03-24 PROCEDURE — 93000 ELECTROCARDIOGRAM COMPLETE: CPT | Performed by: INTERNAL MEDICINE

## 2023-03-24 RX ORDER — CETIRIZINE HYDROCHLORIDE 10 MG/1
10 TABLET ORAL DAILY
COMMUNITY
Start: 2023-03-09

## 2023-03-24 NOTE — PROGRESS NOTES
Barbara Tapia  [id number]  1960  63 y.o.  female    Referring Provider: Austin Ocampo MD    Reason for  Visit:      short term office follow up after recent encounter   Chest pain    Shortness of breath and palpitations  Now sees Dr Ocampo now as Dr Bartlett has retired  Cardiac workup test results as below : zio patch and echo   Cardiac cath Cardiac cath non obstructive coronary artery disease      Subjective    Had Covid recently   Now improving     Has had cough now better   Left chest wall pain after coughing     Uses cane for support and balance   Chest pain at random and not necessarily related to exertion  Non exertional chest pain   Prior chest pains much better after starting Imdur   Has had headaches after this but now better     Mild chronic exertional shortness of breath on exertion relieved with rest  No significant cough or wheezing    No palpitations  No particular associated chest pain  No significant pedal edema    No fever or chills  No significant expectoration    No hemoptysis  No presyncope or syncope    Tolerating current medications well with no untoward side effects   Compliant with prescribed medication regimen. Tries to adhere to cardiac diet.   Joint pain in small, medium and large joints       No bleeding, excessive bruising, gait instability or fall risks    No syncope or syncope     BP well controlled at home.     Blood sugars not well controlled at home   obstructive sleep apnea on BiPAP and compliant   Device check as below           History of present illness:  Barbara Tapia is a 63 y.o. yo female with essential Hypertension, Type 2 diabetes mellitus  Morbid obesity     who presents today for   Chief Complaint   Patient presents with   • Slow Heart Rate     1 YR FU   • Chest Pain     NEW ONSET CHEST PRESSURE   .    History  Past Medical History:   Diagnosis Date   • Anxiety    • Anxiety    • Arthritis    • Asthma    • Back pain    • Cervicalgia 5/14/2020   • Chest pain    •  Chronic fatigue    • Chronic kidney disease     Kidneys are only functioning 50 %   • Depression    • Diabetes (HCC)    • Diabetes mellitus (HCC)    • Dizzy    • Fibromyalgia    • Fibromyalgia    • Hiatal hernia    • Hypertension    • Infection of skin and subcutaneous tissue due to fungus    • Knee contracture     total   • Palpitations    • Panic attacks    • PONV (postoperative nausea and vomiting)    • Sleep apnea    • Sleep apnea     BiPAP   • Vertigo    ,   Past Surgical History:   Procedure Laterality Date   • ANKLE FUSION Left    • ANKLE SURGERY Left     multiple surgeries   • ANTERIOR CERVICAL DISCECTOMY W/ FUSION N/A 2020    Procedure: ANTERIOR CERVICAL DISCECTOMY FUSION C5-7;  Surgeon: FLO Blanchard MD;  Location: [facility] OR;  Service: Orthopedic Spine;  Laterality: N/A;   • CARDIAC CATHETERIZATION Left 2017    Procedure: Cardiac Catheterization/Vascular Study;  Surgeon: Shane Singh MD;  Location:  [facility] CATH INVASIVE LOCATION;  Service:    • CARDIAC CATHETERIZATION N/A 2017    Procedure: Coronary angiography;  Surgeon: Shane Singh MD;  Location:  [facility] CATH [facility]VASIVE LOCATION;  Service:    • CARDIAC CATHETERIZATION N/A 2017    Procedure: Left Heart Cath;  Surgeon: Shane Singh MD;  Location:  [facility];  Service:    • CARDIAC CATHETERIZATION N/A 2021    Procedure: Left Heart Cath;  Surgeon: Shane Singh MD;  Location:  [facility] CATH [facility]IVE [facility];  Service: Cardiology;  Laterality: N/A;   • CERVICAL FUSION      STITCHED UP TO PREVENT LOSING BABY   • CERVIX LESION DESTRUCTION     •  SECTION     • COLONOSCOPY     • COLONOSCOPY N/A 2017    Procedure: COLONOSCOPY WITH ANESTHESIA;  Surgeon: Raffaele Buchanan MD;  Location: [facility] ENDOSCOPY;  Service:    • ELBOW PROCEDURE Left     screws placed x 2   • ENDOSCOPY N/A 2017    Procedure: ESOPHAGOGASTRODUODENOSCOPY WITH ANESTHESIA;  Surgeon: Raffaele Buchanan MD;  Location: [facility] ENDOSCOPY;   Service:    • EXCISION MASS TRUNK      LEFT SIDE CAT SCRATCH FEVER    • EYE SURGERY      cataract removal sam   • REPLACEMENT TOTAL KNEE BILATERAL Bilateral    • ROTATOR CUFF REPAIR     • SINUS SURGERY     • SUBTOTAL HYSTERECTOMY      ovaries still intact   • TENNIS ELBOW RELEASE Right    ,   Family History   Problem Relation Age of Onset   • Heart disease Mother    • Obesity Mother    • Diabetes Mother    • Hypertension Mother    • Stroke Mother    • Heart disease Father    • Stroke Brother    • Heart disease Brother    • Colon cancer Neg Hx    • Colon polyps Neg Hx    ,   Social History     Tobacco Use   • Smoking status: Never   • Smokeless tobacco: Never   Vaping Use   • Vaping Use: Never used   Substance Use Topics   • Alcohol use: No   • Drug use: No   ,     Medications  Current Outpatient Medications   Medication Sig Dispense Refill   • albuterol (PROVENTIL HFA;VENTOLIN HFA) 108 (90 BASE) MCG/ACT inhaler 2 puffs 4 (Four) Times a Day.     • amLODIPine (NORVASC) 10 MG tablet Take 1 tablet by mouth Daily.     • cetirizine (zyrTEC) 10 MG tablet Take 1 tablet by mouth Daily.     • diazePAM (VALIUM) 5 MG tablet Take 1 tablet by mouth Every 12 (Twelve) Hours As Needed.     • DULoxetine (CYMBALTA) 60 MG capsule Take 1 capsule by mouth 2 (Two) Times a Day.     • famotidine (PEPCID) 20 MG tablet famotidine 20 mg tablet   Take 1 tablet every day by oral route for 2 days.     • flecainide (TAMBOCOR) 50 MG tablet TAKE 1 TABLET BY MOUTH TWO TIMES A DAY. 180 tablet 4   • fluticasone (FLONASE) 50 MCG/ACT nasal spray 2 sprays into the nostril(s) as directed by provider Daily.     • hydroCHLOROthiazide (HYDRODIURIL) 25 MG tablet Take 0.5 tablets by mouth Daily. 90 tablet 3   • insulin glargine (LANTUS) 100 UNIT/ML injection Inject 55 Units under the skin into the appropriate area as directed 2 (Two) Times a Day.     • insulin lispro (humaLOG) 100 UNIT/ML injection Inject  under the skin into the appropriate area as directed 3  (Three) Times a Day Before Meals.     • isosorbide mononitrate (IMDUR) 30 MG 24 hr tablet Take 1 tablet by mouth Every Morning. 90 tablet 3   • losartan (COZAAR) 100 MG tablet Take 1 tablet by mouth Daily.     • meclizine (ANTIVERT) 12.5 MG tablet Take 1 tablet by mouth 3 (Three) Times a Day As Needed.     • metoprolol tartrate (LOPRESSOR) 100 MG tablet TAKE 1 TABLET BY MOUTH 2 (TWO) TIMES A DAY. 180 tablet 4   • nystatin (MYCOSTATIN) 160529 UNIT/ML suspension Take 5 mL by mouth As Needed.     • simvastatin (ZOCOR) 10 MG tablet Take 1 tablet by mouth Every Night.     • tiZANidine (ZANAFLEX) 4 MG tablet 1 tablet 3 (Three) Times a Day As Needed for Muscle Spasms.     • traMADol (ULTRAM) 50 MG tablet Take 1 tablet by mouth Every 6 (Six) Hours As Needed for Moderate Pain.     • vitamin D (ERGOCALCIFEROL) 1.25 MG (57835 UT) capsule capsule Take 1 capsule by mouth 1 (One) Time Per Week.     • glipiZIDE (GLUCOTROL) 10 MG tablet Take 10 mg by mouth 2 (Two) Times a Day Before Meals. (Patient not taking: Reported on 3/24/2023)     • ketoconazole (NIZORAL) 200 MG tablet Daily As Needed. (Patient not taking: Reported on 3/24/2023)       No current facility-administered medications for this visit.       Allergies:  Azelastine-fluticasone, Contrast dye (echo or unknown ct/mr), Iodides, Metformin and related, Oxycodone-aspirin, Nisoldipine er, Pregabalin, Sular [nisoldipine], and Sulfa antibiotics    Review of Systems  Review of Systems   Constitutional: Positive for malaise/fatigue.   HENT: Negative.    Eyes: Negative.    Cardiovascular: Positive for chest pain, dyspnea on exertion, leg swelling and syncope. Negative for claudication, cyanosis, irregular heartbeat, near-syncope, orthopnea, palpitations and paroxysmal nocturnal dyspnea.   Respiratory: Negative.    Endocrine: Negative.    Hematologic/Lymphatic: Negative.    Skin: Negative.    Musculoskeletal: Positive for arthritis and back pain.   Gastrointestinal: Positive for  "dysphagia and heartburn. Negative for anorexia.   Genitourinary: Negative.    Neurological: Positive for dizziness and weakness.   Psychiatric/Behavioral: Negative.        Objective     Physical Exam:  /74 (BP Location: Left arm, Patient Position: Sitting, Cuff Size: Large Adult)   Pulse 56   Ht 167.6 cm (65.98\")   Wt 126 kg (277 lb)   LMP  (LMP Unknown)   SpO2 96%   BMI 44.73 kg/m²   Physical Exam   Constitutional: She appears well-developed.   HENT:   Head: Normocephalic.   Neck: Normal carotid pulses and no JVD present. No tracheal tenderness present. Carotid bruit is not present. No tracheal deviation present.   Cardiovascular: Regular rhythm, normal heart sounds and normal pulses.   Pulmonary/Chest: Effort normal. No stridor.   Abdominal: Soft. She exhibits no distension. There is no abdominal tenderness.     Vascular Status -  Her right foot exhibits abnormal foot edema. Her left foot exhibits abnormal foot edema.  Neurological: She is alert. No cranial nerve deficit or sensory deficit.   Skin: Skin is warm.   Psychiatric: Her speech is normal and behavior is normal.       Results Review:    Results for orders placed during the hospital encounter of 22    Adult Transthoracic Echo Complete w/ Color, Spectral and Contrast if necessary per protocol    Interpretation Summary  · Left ventricular ejection fraction appears to be 56 - 60%. Left ventricular systolic function is normal.  · Left ventricular diastolic function was normal.  · Abnormal global longitudinal LV strain (GLS) = -11.0%  · Estimated right ventricular systolic pressure from tricuspid regurgitation is normal (<35 mmHg).       Barbara Tapia  Echo Complete w/ Doppler, Color Flow and Strain  Order# [id number]  Reading physician: Shane Singh MD Ordering physician: Shane Singh MD Study date: 20       Patient Information    Patient Name   Barbara Tapia MRN   [id number] Legal Sex   Female  (Age)   1960 (62 y.o.)     PACS " Images     Show images for Adult Transthoracic Echo Complete W/ Cont if Necessary Per Protocol   Sedation Narrator Report    Sedation Narrator Report        Interpretation Summary    · Left ventricular ejection fraction appears to be 56 - 60%.  · Abnormal global longitudinal LV strain (GLS) = -13%  · Left ventricular diastolic function was normal.  · No evidence of pulmonary hypertension is present.         Barbara Tapia  Echo Complete w/Doppler and Color Flow  Order# [id number]  Reading physician: Shane Singh MD Ordering physician: Shane Singh MD Study date: 17       Patient Information    Patient Name   Barbara Tapia MRN   [id number] Legal Sex   Female  (Age)   1960 (62 y.o.)     PACS Images     Show images for Adult Transthoracic Echo Complete   Sedation Narrator Report    Sedation Narrator Report        Interpretation Summary    · Left ventricular function is normal. Estimated EF = 60%.  · Left ventricular diastolic dysfunction (grade I) consistent with impaired relaxation.  · Estimated right ventricular systolic pressure from tricuspid regurgitation is normal (<35 mmHg).            Results for orders placed during the hospital encounter of 21    Adult Stress Echo W/ Cont or Stress Agent if Necessary Per Protocol    Interpretation Summary  · Estimated left ventricular EF = 55% Left ventricular systolic function is normal.  · Due to effective beta-blockade patient did not achieve target heart rate. Achieved 76% of maximal predicted.  · Low risk stress test for stress induced myocardial ischemia    Conclusion 2021     50% stenosis of proximal left anterior descending coronary artery with normal RFR of 0.93  No other significant epicardial coronary artery stenotic disease  Mildly elevated left ventricular end-diastolic pressure of 16 mmHg        Plan        Keep LDL well below 70 mg/dL  Treat for elevated LVEDP such as treating hypertension losing weight salt restriction evaluation and  treatment for obstructive sleep apnea  Vigorous oral hydration at home  IV hydration prior to discharge  Patient advised to follow-up BMP locally with primary care provider  Observation  Risk factor modifications             Results for orders placed during the hospital encounter of 21    Adult Stress Echo W/ Cont or Stress Agent if Necessary Per Protocol    Interpretation Summary  · Estimated left ventricular EF = 55% Left ventricular systolic function is normal.  · Due to effective beta-blockade patient did not achieve target heart rate. Achieved 76% of maximal predicted.  · Low risk stress test for stress induced myocardial ischemia       Barbara Tapia  Holter Monitor 72Hr-21Day (0296T/0298T)  Order# [id number]  Reading physician: Shane Singh MD Ordering physician: Shane Singh MD Study date: 20   Patient Information    Patient Name   Barbara Tapia MRN   [id number] Sex   Female  (Age)   1960 (60 y.o.)   Interpretation Summary    · Entire report was reviewed.  Monitoring in days: ~14   · The predominant rhythm noted during the testing period was sinus rhythm.     · Rare supraventricular ectopics with an APC burden of: < 1%    · Rare premature ventricular contractions with a PVC burden of: < 1%     · No correlated arrhythmia  · No significant pauses                  Conclusion:      Baseline rhythm is sinus.  No significant ectopy   96 runs of supraventricular tachycardia longest 1 minute and 40 seconds at a maximum rate of 158 bpm and an average of 99 bpm          Zio patch     · Average HR: 64. Min HR: 45. Max HR: 143.     · The predominant rhythm noted during the testing period was sinus rhythm.  · Premature atrial and ventricular contractions occured as below.  · Total ectopy burden during the monitoring period; PVCs:  <1% and APCs: <1%                   · No significant supraventricular or ventricular tachy or kaitlynn arrhythmia.  · 28    supraventricular tachycardia episodes fastest 5     beats at maximum rate of    143    BPM   · No correlated arrhythmia  · No significant pauses  · Entire report was reviewed        Conclusion  2017  Non obstructive  coronary artery disease with dominant RCA  Normal LVEF  LVEDP   29    mm Hg     Plan     Hydration overnight   Nephrology consult  Observation  Risk factor modifications  Workup for non cardiac causes of chest pain          ECG 12 Lead    Date/Time: 3/24/2023 10:29 AM  Performed by: Shane Singh MD  Authorized by: Shane Singh MD   Comparison: compared with previous ECG from 4/27/2022  Comparison to previous ECG: Ventricular rate changed from 59  to 56   beats per minute    Rhythm: sinus bradycardia  Rate: bradycardic  Conduction: conduction normal  ST Segments: ST segments normal  T Waves: T waves normal  QRS axis: normal  Other: no other findings    Clinical impression: normal ECG          ____________________________________________________________________________________________________________________________________________  Health maintenance and recommendations  Similar recommendations as last visit     Offered to give patient  a copy      Questions were encouraged, asked and answered to the patient's  understanding and satisfaction. Questions if any regarding current medications and side effects, need for refills and importance of compliance to medications stressed.    Reviewed available prior notes, consults, prior visits, laboratory findings, radiology and cardiology relevant reports. Updated chart as applicable. I have reviewed the patient's medical history in detail and updated the computerized patient record as relevant.      Updated patient regarding any new or relevant abnormalities on review of records or any new findings on physical exam. Mentioned to patient about purpose of visit and desirable health short and long term goals and objectives.    Primary to monitor CBC CMP Lipid panel and TSH as  applicable    ___________________________________________________________________________________________________________________________________________      Diagnoses and all orders for this visit:    1. Abnormal cardiac CT angiography (Primary)    2. Autonomic orthostatic hypotension    3. Essential hypertension    4. BLADIMIR treated with BiPAP    5. Other dysphagia    6. Paroxysmal SVT (supraventricular tachycardia) (HCC)    7. Type 2 diabetes mellitus with hyperglycemia, with long-term current use of insulin (HCC)    Other orders  -     ECG 12 Lead           Plan    Patient expressed understanding  Encouraged and answered all questions   Discussed with the patient and all questioned fully answered. She will call me if any problems arise.   Discussed results of prior testing with patient : echo   as well electrocardiogram from today       Continue same medications     Overall doing well no new cardiovascular symptoms and therefore no additional cardiac testing is required prior to next visit  If any interim issues arise will call me for further evaluation.   Likely has microvascular disease and will add long acting nitrates      Check BP and heart rates twice daily initially till blood pressures and heart rates under good control and then at least 3x / week,   If blood pressures continue to be well-controlled then can check week a month  at home and bring a recording for review next visit  If BP >130/85 or < 100/60 persistently over 3 reading 30 mins apart or if heart rates persistently above 100 bpm or less than 55 bpm call sooner for evaluation and advise     Patient expressed understanding  Encouraged and answered all questions   Discussed with the patient and all questioned fully answered. She will call me if any problems arise.   Discussed results of prior testing with patient including cath     Monitor for any signs of bleeding including red or dark stools as well as easy bruisabilty. Fall precautions.    Monitor cardiac rhythm device function regularly per established schedule or PRN    Patient was advised to continue BiPAP daily.           Return in about 6 months (around 9/24/2023).

## 2023-04-04 RX ORDER — METOPROLOL TARTRATE 100 MG/1
100 TABLET ORAL 2 TIMES DAILY
Qty: 180 TABLET | Refills: 4 | Status: SHIPPED | OUTPATIENT
Start: 2023-04-04

## 2023-05-16 RX ORDER — ISOSORBIDE MONONITRATE 30 MG/1
30 TABLET, EXTENDED RELEASE ORAL EVERY MORNING
Qty: 90 TABLET | Refills: 3 | Status: SHIPPED | OUTPATIENT
Start: 2023-05-16

## 2023-06-07 ENCOUNTER — TRANSCRIBE ORDERS (OUTPATIENT)
Dept: ADMINISTRATIVE | Age: 63
End: 2023-06-07

## 2023-06-07 DIAGNOSIS — N63.31 MASS OF AXILLARY TAIL OF RIGHT BREAST: Primary | ICD-10-CM

## 2023-06-07 DIAGNOSIS — R92.8 ABNORMAL MAMMOGRAM: ICD-10-CM

## 2023-06-27 ENCOUNTER — HOSPITAL ENCOUNTER (OUTPATIENT)
Dept: WOMENS IMAGING | Age: 63
Discharge: HOME | End: 2023-06-27
Payer: MEDICARE

## 2023-06-27 DIAGNOSIS — N63.31 MASS OF AXILLARY TAIL OF RIGHT BREAST: ICD-10-CM

## 2023-06-27 DIAGNOSIS — R92.8 ABNORMAL MAMMOGRAM: ICD-10-CM

## 2023-06-27 PROCEDURE — 77065 DX MAMMO INCL CAD UNI: CPT | Performed by: RADIOLOGY

## 2023-06-27 PROCEDURE — 88305 TISSUE EXAM BY PATHOLOGIST: CPT

## 2023-06-27 PROCEDURE — 77065 DX MAMMO INCL CAD UNI: CPT

## 2023-06-27 PROCEDURE — 77066 DX MAMMO INCL CAD BI: CPT

## 2023-06-27 PROCEDURE — 77066 DX MAMMO INCL CAD BI: CPT | Performed by: RADIOLOGY

## 2023-06-27 PROCEDURE — 19083 BX BREAST 1ST LESION US IMAG: CPT

## 2023-09-02 PROCEDURE — G2066 INTER DEVC REMOTE 30D: HCPCS | Performed by: INTERNAL MEDICINE

## 2023-09-02 PROCEDURE — 93298 REM INTERROG DEV EVAL SCRMS: CPT | Performed by: INTERNAL MEDICINE

## 2023-09-13 PROBLEM — N18.2 STAGE 2 CHRONIC KIDNEY DISEASE: Status: ACTIVE | Noted: 2021-05-17

## 2023-09-13 NOTE — PROGRESS NOTES
Chief Complaint  PSVT (6mo F/U) and Hypertension    Subjective          Barbara Tapia presents to [facility] for routine follow-up.  She has nonocclusive coronary artery disease (40 to 50% mid LAD stenosis 4/19/2017), paroxysmal supraventricular tachycardia, type 2 diabetes mellitus, stage 2 chronic kidney disease, hypertension, hyperlipidemia, obstructive sleep apnea and morbid obesity. She continues to complain of intermittent, non-exertional, non-radiating substernal chest heaviness that occurs daily. She reports associated shortness of breath and nausea at times. The pain lasts anywhere from 15 minutes to an hour and a half and resolves on its own. Patient denies palpitations, dizziness, syncope, orthopnea, PND, edema or decreased stamina.  Patient denies any signs of bleeding.     Hypertension  This is a chronic problem. The current episode started more than 1 year ago. The problem is uncontrolled. Associated symptoms include chest pain and shortness of breath. Pertinent negatives include no anxiety, blurred vision, headaches, malaise/fatigue, neck pain, orthopnea, palpitations, peripheral edema, PND or sweats. Risk factors for coronary artery disease include obesity, post-menopausal state, diabetes mellitus and dyslipidemia. Current antihypertension treatment includes calcium channel blockers, beta blockers, angiotensin blockers and diuretics. The current treatment provides significant improvement. Hypertensive end-organ damage includes kidney disease.   Hyperlipidemia  This is a chronic problem. The current episode started more than 1 year ago. Exacerbating diseases include diabetes and obesity. Associated symptoms include chest pain and shortness of breath. Current antihyperlipidemic treatment includes statins. Risk factors for coronary artery disease include hypertension, obesity, post-menopausal, diabetes mellitus and dyslipidemia.   Coronary Artery Disease  Presents for  "follow-up visit. Symptoms include chest pain and shortness of breath. Pertinent negatives include no chest pressure, chest tightness, dizziness, leg swelling, muscle weakness, palpitations or weight gain. Risk factors include hyperlipidemia and obesity. The symptoms have been stable. Compliance with diet is variable. Compliance with exercise is variable. Compliance with medications is good.       Objective   Vital Signs:   /75   Pulse 70   Ht 167.6 cm (66\")   Wt 136 kg (300 lb)   SpO2 99%   BMI 48.42 kg/m²     Vitals and nursing note reviewed.   Constitutional:       General: Not in acute distress.     Appearance: Normal and healthy appearance. Well-developed, normal weight and not in distress. Not diaphoretic.   Eyes:      General: Lids are normal.         Right eye: No discharge.         Left eye: No discharge.      Conjunctiva/sclera: Conjunctivae normal.      Pupils: Pupils are equal, round, and reactive to light.   HENT:      Head: Normocephalic and atraumatic.      Jaw: There is normal jaw occlusion.      Right Ear: External ear normal.      Left Ear: External ear normal.      Nose: Nose normal.   Neck:      Thyroid: No thyromegaly.      Vascular: No carotid bruit, JVD or JVR. JVD normal.      Trachea: Trachea normal. No tracheal deviation.   Pulmonary:      Effort: Pulmonary effort is normal. No respiratory distress.      Breath sounds: Normal breath sounds. No decreased breath sounds. No wheezing. No rhonchi. No rales.   Chest:      Chest wall: Not tender to palpatation.   Cardiovascular:      PMI at left midclavicular line. Normal rate. Regular rhythm. Normal S1. Normal S2.       Murmurs: There is no murmur.      No gallop.  No click. No rub.   Pulses:     Intact distal pulses. No decreased pulses.   Edema:     Peripheral edema absent.   Abdominal:      General: Bowel sounds are normal. There is no distension.      Palpations: Abdomen is soft.      Tenderness: There is no abdominal tenderness. "   Musculoskeletal: Normal range of motion.         General: No tenderness or deformity.      Cervical back: Normal range of motion and neck supple. Skin:     General: Skin is warm and dry.      Coloration: Skin is not pale.      Findings: No erythema or rash.        Neurological:      General: No focal deficit present.      Mental Status: Alert, oriented to person, place, and time and oriented to person, place and time.   Psychiatric:         Attention and Perception: Attention and perception normal.         Mood and Affect: Mood and affect normal.         Speech: Speech normal.         Behavior: Behavior normal.         Thought Content: Thought content normal.         Cognition and Memory: Cognition and memory normal.         Judgment: Judgment normal.      Result Review :   The following data was reviewed by: ELTON Kimbrough on 09/14/2023:      Data reviewed: Cardiology studies Stress echo 4/9/21, 2D echo 12/28/20 and holter monitor 1/21.       ECG 12 Lead    Date/Time: 9/14/2023 9:58 AM  Performed by: Venecia Roth APRN  Authorized by: Venecia Roth APRN   Comparison: compared with previous ECG from 3/24/2023  Rhythm: sinus rhythm  Rate: normal  BPM: 70  QRS axis: normal    Clinical impression: normal ECG          Assessment and Plan    Diagnoses and all orders for this visit:    1. Paroxysmal SVT (supraventricular tachycardia) (CMS/HCC) (Primary)-No recurrence on most recent Linq interrogation.  Continue flecainide.     2. Nonobstructive atherosclerosis of coronary artery-50% mid LAD stenosis on left heart catheterization 7/8/2021. Continue Imdur. Start aspirin 81 mg daily.     3. Essential hypertension-blood pressure is elevated in office today. Pt has not been monitoring routinely at home. Continue HCTZ, losartan, metoprolol tartrate and amlodipine.  Monitor and record daily blood pressure. Report readings consistently higher than 130/80 or consistently lower than 100/60.     4. Mixed  hyperlipidemia-management per PCP.  Continue simvastatin.    5. Type 2 diabetes mellitus with hyperglycemia, with long-term current use of insulin (CMS/[facility])-management per PCP. Stable.     6. BLADIMIR treated with BiPAP-patient reports compliance with BiPAP. Stable.     7. Class 3 severe obesity due to excess calories with serious comorbidity and body mass index (BMI) of 45.0 to 49.9 in adult (CMS/[facility])- Patient's Body mass index is 48.42 kg/m². indicating that she is morbidly obese (BMI > 40 or > 35 with obesity - related health condition). Obesity-related health conditions include the following: obstructive sleep apnea, hypertension, coronary heart disease, diabetes mellitus and dyslipidemias. Obesity is unchanged. BMI is is above average; no BMI management plan is appropriate. We discussed low calorie, low carb based diet program, portion control and increasing exercise. We discussed referral to bariatric appointment, however she declined.     8. Stage 2 chronic kidney disease- Pt follows with Jim CONDE with nephrology.       Follow Up   Return in about 6 months (around 3/14/2024) for Next scheduled follow up.  Patient was given instructions and counseling regarding her condition or for health maintenance advice. Please see specific information pulled into the AVS if appropriate.

## 2023-09-14 ENCOUNTER — OFFICE VISIT (OUTPATIENT)
Dept: CARDIOLOGY | Facility: CLINIC | Age: 63
End: 2023-09-14
Payer: MEDICARE

## 2023-09-14 VITALS
DIASTOLIC BLOOD PRESSURE: 75 MMHG | SYSTOLIC BLOOD PRESSURE: 137 MMHG | BODY MASS INDEX: 47.09 KG/M2 | OXYGEN SATURATION: 99 % | HEART RATE: 70 BPM | HEIGHT: 66 IN | WEIGHT: 293 LBS

## 2023-09-14 DIAGNOSIS — E78.2 MIXED HYPERLIPIDEMIA: Chronic | ICD-10-CM

## 2023-09-14 DIAGNOSIS — I25.10 NONOBSTRUCTIVE ATHEROSCLEROSIS OF CORONARY ARTERY: ICD-10-CM

## 2023-09-14 DIAGNOSIS — E66.01 CLASS 3 SEVERE OBESITY DUE TO EXCESS CALORIES WITH SERIOUS COMORBIDITY AND BODY MASS INDEX (BMI) OF 45.0 TO 49.9 IN ADULT: ICD-10-CM

## 2023-09-14 DIAGNOSIS — I47.1 PAROXYSMAL SVT (SUPRAVENTRICULAR TACHYCARDIA): Primary | ICD-10-CM

## 2023-09-14 DIAGNOSIS — I10 ESSENTIAL HYPERTENSION: Chronic | ICD-10-CM

## 2023-09-14 DIAGNOSIS — Z79.4 TYPE 2 DIABETES MELLITUS WITH HYPERGLYCEMIA, WITH LONG-TERM CURRENT USE OF INSULIN: Chronic | ICD-10-CM

## 2023-09-14 DIAGNOSIS — E11.65 TYPE 2 DIABETES MELLITUS WITH HYPERGLYCEMIA, WITH LONG-TERM CURRENT USE OF INSULIN: Chronic | ICD-10-CM

## 2023-09-14 DIAGNOSIS — N18.2 STAGE 2 CHRONIC KIDNEY DISEASE: ICD-10-CM

## 2023-09-14 DIAGNOSIS — G47.33 OSA TREATED WITH BIPAP: Chronic | ICD-10-CM

## 2023-09-14 RX ORDER — FLECAINIDE ACETATE 50 MG/1
50 TABLET ORAL 2 TIMES DAILY
Qty: 180 TABLET | Refills: 4 | Status: SHIPPED | OUTPATIENT
Start: 2023-09-14

## 2023-09-14 RX ORDER — ATORVASTATIN CALCIUM 20 MG/1
20 TABLET, FILM COATED ORAL
COMMUNITY
Start: 2023-05-15 | End: 2024-05-15

## 2024-04-10 ENCOUNTER — OFFICE VISIT (OUTPATIENT)
Dept: CARDIOLOGY | Facility: CLINIC | Age: 64
End: 2024-04-10
Payer: MEDICARE

## 2024-04-10 VITALS
BODY MASS INDEX: 47.09 KG/M2 | WEIGHT: 293 LBS | HEART RATE: 71 BPM | HEIGHT: 66 IN | OXYGEN SATURATION: 97 % | RESPIRATION RATE: 18 BRPM | DIASTOLIC BLOOD PRESSURE: 68 MMHG | SYSTOLIC BLOOD PRESSURE: 120 MMHG

## 2024-04-10 DIAGNOSIS — E78.2 MIXED HYPERLIPIDEMIA: Chronic | ICD-10-CM

## 2024-04-10 DIAGNOSIS — I47.10 PAROXYSMAL SVT (SUPRAVENTRICULAR TACHYCARDIA): ICD-10-CM

## 2024-04-10 DIAGNOSIS — I10 ESSENTIAL HYPERTENSION: Chronic | ICD-10-CM

## 2024-04-10 DIAGNOSIS — R07.9 CHEST PAIN, UNSPECIFIED TYPE: Primary | ICD-10-CM

## 2024-04-10 DIAGNOSIS — G47.33 OSA TREATED WITH BIPAP: Chronic | ICD-10-CM

## 2024-04-10 DIAGNOSIS — I25.10 NONOBSTRUCTIVE ATHEROSCLEROSIS OF CORONARY ARTERY: ICD-10-CM

## 2024-04-10 PROCEDURE — 3074F SYST BP LT 130 MM HG: CPT | Performed by: NURSE PRACTITIONER

## 2024-04-10 PROCEDURE — 1159F MED LIST DOCD IN RCRD: CPT | Performed by: NURSE PRACTITIONER

## 2024-04-10 PROCEDURE — 1160F RVW MEDS BY RX/DR IN RCRD: CPT | Performed by: NURSE PRACTITIONER

## 2024-04-10 PROCEDURE — 93000 ELECTROCARDIOGRAM COMPLETE: CPT | Performed by: NURSE PRACTITIONER

## 2024-04-10 PROCEDURE — 99214 OFFICE O/P EST MOD 30 MIN: CPT | Performed by: NURSE PRACTITIONER

## 2024-04-10 PROCEDURE — 3078F DIAST BP <80 MM HG: CPT | Performed by: NURSE PRACTITIONER

## 2024-04-10 RX ORDER — ASPIRIN 81 MG/1
81 TABLET ORAL DAILY
COMMUNITY

## 2024-04-10 RX ORDER — FEXOFENADINE HCL 180 MG/1
180 TABLET ORAL DAILY
COMMUNITY

## 2024-04-10 NOTE — PROGRESS NOTES
Subjective:     Encounter Date:04/10/2024      Patient ID: Barbara Tapia is a 64 y.o. female     Chief Complaint:  Chest Pain   Associated symptoms include malaise/fatigue. Pertinent negatives include no abdominal pain, back pain, cough, dizziness, fever, headaches, hemoptysis, nausea, near-syncope, orthopnea, palpitations, PND, shortness of breath, syncope or vomiting.     Patient presents today for routine cardiology follow up. Patient is followed for nonobstructive coronary artery disease and paroxysmal SVT. Patient's SVT is managed on Flecainide. She has a loop recorder that was placed in 2021. Last cath in 2021 with up to 50% of mid LAD- and otherwise no obstructive coronary artery disease. Patient has hypertension, hyperlipidemia, obesity, chronic pain, sleep apnea, and type II diabetes. Follows with Dr. Ocampo as her PCP. Denies heart racing. She has some occasional, atypical pain. Mostly at night while sitting on the couch. Sharp shooting pain. Not exertional. Unchanged. She also notes some tenderness around her LINQ device at times. No redness or swelling. Overall she is stable.     The following portions of the patient's history were reviewed and updated as appropriate: allergies, current medications, past medical history, past social history, past and problem list.    Allergies   Allergen Reactions    Azelastine-Fluticasone Itching    Contrast Dye (Echo Or Unknown Ct/Mr) Nausea And Vomiting    Iodides Nausea And Vomiting    Metformin And Related Itching    Oxycodone-Aspirin Nausea And Vomiting    Nisoldipine Er Rash    Pregabalin Rash    Sular [Nisoldipine] Rash    Sulfa Antibiotics Rash       Current Outpatient Medications:     albuterol (PROVENTIL HFA;VENTOLIN HFA) 108 (90 BASE) MCG/ACT inhaler, 2 puffs 4 (Four) Times a Day., Disp: , Rfl:     amLODIPine (NORVASC) 10 MG tablet, Take 1 tablet by mouth Daily., Disp: , Rfl:     aspirin 81 MG EC tablet, Take 1 tablet by mouth Daily., Disp: , Rfl:      atorvastatin (LIPITOR) 20 MG tablet, Take 1 tablet by mouth., Disp: , Rfl:     diazePAM (VALIUM) 5 MG tablet, Take 1 tablet by mouth Every 12 (Twelve) Hours As Needed., Disp: , Rfl:     DULoxetine (CYMBALTA) 60 MG capsule, Take 1 capsule by mouth 2 (Two) Times a Day., Disp: , Rfl:     fexofenadine (ALLEGRA) 180 MG tablet, Take 1 tablet by mouth Daily., Disp: , Rfl:     flecainide (TAMBOCOR) 50 MG tablet, Take 1 tablet by mouth 2 (Two) Times a Day., Disp: 180 tablet, Rfl: 4    fluticasone (FLONASE) 50 MCG/ACT nasal spray, 2 sprays into the nostril(s) as directed by provider As Needed., Disp: , Rfl:     hydroCHLOROthiazide (HYDRODIURIL) 25 MG tablet, TAKE 1/2 (ONE HALF) TABLETS BY MOUTH DAILY, Disp: 45 tablet, Rfl: 3    insulin glargine (LANTUS) 100 UNIT/ML injection, Inject 100 Units under the skin into the appropriate area as directed Every Night., Disp: , Rfl:     insulin lispro (humaLOG) 100 UNIT/ML injection, Inject  under the skin into the appropriate area as directed 2 (Two) Times a Day., Disp: , Rfl:     isosorbide mononitrate (IMDUR) 30 MG 24 hr tablet, TAKE 1 TABLET BY MOUTH EVERY MORNING., Disp: 90 tablet, Rfl: 3    losartan (COZAAR) 100 MG tablet, Take 1 tablet by mouth Daily., Disp: , Rfl:     meclizine (ANTIVERT) 12.5 MG tablet, Take 1 tablet by mouth 2 (Two) Times a Day., Disp: , Rfl:     metoprolol tartrate (LOPRESSOR) 100 MG tablet, TAKE 1 TABLET BY MOUTH 2 (TWO) TIMES A DAY., Disp: 180 tablet, Rfl: 4    nystatin (MYCOSTATIN) 271373 UNIT/ML suspension, Take 5 mL by mouth As Needed., Disp: , Rfl:     tiZANidine (ZANAFLEX) 4 MG tablet, 1 tablet 3 (Three) Times a Day As Needed for Muscle Spasms., Disp: , Rfl:     traMADol (ULTRAM) 50 MG tablet, Take 1 tablet by mouth Every 6 (Six) Hours As Needed for Moderate Pain., Disp: , Rfl:     vitamin D (ERGOCALCIFEROL) 1.25 MG (59752 UT) capsule capsule, Take 1 capsule by mouth 1 (One) Time Per Week., Disp: , Rfl:     Social History     Socioeconomic History     Marital status:    Tobacco Use    Smoking status: Never    Smokeless tobacco: Never   Vaping Use    Vaping status: Never Used   Substance and Sexual Activity    Alcohol use: No    Drug use: No    Sexual activity: Defer       Review of Systems   Constitutional: Positive for malaise/fatigue. Negative for chills, decreased appetite, fever, weight gain and weight loss.   HENT:  Negative for nosebleeds.    Eyes:  Negative for visual disturbance.   Cardiovascular:  Positive for chest pain. Negative for dyspnea on exertion, leg swelling, near-syncope, orthopnea, palpitations, paroxysmal nocturnal dyspnea and syncope.   Respiratory:  Negative for cough, hemoptysis, shortness of breath and snoring.    Endocrine: Negative for cold intolerance and heat intolerance.   Hematologic/Lymphatic: Negative for bleeding problem. Does not bruise/bleed easily.   Skin:  Negative for rash.   Musculoskeletal:  Positive for arthritis. Negative for back pain and falls.   Gastrointestinal:  Negative for abdominal pain, constipation, diarrhea, heartburn, melena, nausea and vomiting.   Genitourinary:  Negative for hematuria.   Neurological:  Negative for dizziness, headaches and light-headedness.   Psychiatric/Behavioral:  Negative for altered mental status.    Allergic/Immunologic: Negative for persistent infections.              Objective:     Constitutional:       Appearance: Healthy appearance. Not in distress. Obese. Chronically ill-appearing.   Eyes:      Pupils: Pupils are equal, round, and reactive to light.   HENT:      Head: Normocephalic and atraumatic.      Nose: Nose normal.    Mouth/Throat:      Dentition: Normal.   Pulmonary:      Effort: Pulmonary effort is normal.      Breath sounds: Normal breath sounds.   Chest:      Chest wall: Not tender to palpatation.   Cardiovascular:      PMI at left midclavicular line. Normal rate. Regular rhythm.      Murmurs: There is no murmur.   Pulses:     Intact distal pulses.   Edema:      "Peripheral edema absent.   Abdominal:      General: Bowel sounds are normal.      Palpations: Abdomen is soft.   Musculoskeletal: Normal range of motion.      Cervical back: Normal range of motion. Skin:     General: Skin is warm and dry.   Neurological:      Mental Status: Alert, oriented to person, place, and time and oriented to person, place and time.   Psychiatric:         Attention and Perception: Attention normal.         Mood and Affect: Mood normal.             ECG 12 Lead    Date/Time: 4/10/2024 11:59 AM  Performed by: Rodolfo Serrano APRN    Authorized by: Rodolfo Serrano APRN  Comparison: compared with previous ECG from 9/14/2023  Similar to previous ECG  Comparison to previous ECG: Poor quality EKG  Rhythm: sinus rhythm        /68 (BP Location: Right arm, Patient Position: Sitting, Cuff Size: Large Adult)   Pulse 71   Resp 18   Ht 167.6 cm (66\")   Wt (!) 138 kg (305 lb)   LMP  (LMP Unknown)   SpO2 97%   BMI 49.23 kg/m²   Lab Review:   I have reviewed       No results found for: \"CHOL\", \"CHLPL\", \"TRIG\", \"HDL\", \"LDL\", \"LDLDIRECT\"   Results for orders placed during the hospital encounter of 06/17/22    Adult Transthoracic Echo Complete w/ Color, Spectral and Contrast if necessary per protocol    Interpretation Summary  · Left ventricular ejection fraction appears to be 56 - 60%. Left ventricular systolic function is normal.  · Left ventricular diastolic function was normal.  · Abnormal global longitudinal LV strain (GLS) = -11.0%  · Estimated right ventricular systolic pressure from tricuspid regurgitation is normal (<35 mmHg).     Assessment:          Diagnosis Plan   1. Chest pain, unspecified type        2. Nonobstructive atherosclerosis of coronary artery        3. Paroxysmal SVT (supraventricular tachycardia)        4. Essential hypertension        5. Mixed hyperlipidemia        6. BLADIMIR treated with BiPAP               Plan:       Chest Pain - some atypical pain. Not exertional. " Discussed options for stress test or monitoring. She notes she has a lot going on and wishes to monitor for now. Will call if worsens or changes.   Nonobstructive Coronary Artery Disease - on cath in 2021. On Aspirin, Imdur, Lopressor, Losartan and Lipitor.   Paroxysmal SVT - well controlled on Flecainide. None noted on recent loop interrogation.   Hypertension - blood pressure stable.  Hyperlipidemia - On Lipitor. Managed by PCP  Sleep Apnea- reports compliance    Follow up in 6 months

## 2024-05-24 RX ORDER — ISOSORBIDE MONONITRATE 30 MG/1
30 TABLET, EXTENDED RELEASE ORAL EVERY MORNING
Qty: 90 TABLET | Refills: 3 | Status: SHIPPED | OUTPATIENT
Start: 2024-05-24

## 2024-05-29 RX ORDER — METOPROLOL TARTRATE 100 MG/1
100 TABLET ORAL 2 TIMES DAILY
Qty: 180 TABLET | Refills: 4 | Status: SHIPPED | OUTPATIENT
Start: 2024-05-29

## 2024-07-18 RX ORDER — HYDROCHLOROTHIAZIDE 25 MG/1
TABLET ORAL
Qty: 45 TABLET | Refills: 3 | Status: SHIPPED | OUTPATIENT
Start: 2024-07-18

## 2024-07-26 ENCOUNTER — TELEPHONE (OUTPATIENT)
Dept: CARDIOLOGY | Facility: CLINIC | Age: 64
End: 2024-07-26
Payer: MEDICARE

## 2024-07-29 ENCOUNTER — TELEPHONE (OUTPATIENT)
Dept: CARDIOLOGY | Facility: CLINIC | Age: 64
End: 2024-07-29
Payer: MEDICARE

## 2024-07-29 NOTE — TELEPHONE ENCOUNTER
----- Message from Rodolfo Serrano sent at 7/26/2024 11:14 AM CDT -----  Acceptable Risk  ----- Message -----  From: Lucía Roberts MA  Sent: 7/25/2024   4:18 PM CDT  To: ELTON Stock    CARDIAC RISK ASSESSMENT FROM ORAL AND FACIAL SX OF [facility]

## 2024-08-26 ENCOUNTER — TELEPHONE (OUTPATIENT)
Dept: CARDIOLOGY | Facility: CLINIC | Age: 64
End: 2024-08-26
Payer: MEDICARE

## 2024-08-26 NOTE — TELEPHONE ENCOUNTER
Left message to inform patient that their loop recorder has reached JORGE.  We will continue to monitor transmissions as they come through until they stop transmitting.  Instructed patient to follow up with provider as previously scheduled and discuss at that time the need for replacement and or removal if indicated. Follow up with Rodolfo 10/14/24

## 2024-10-01 RX ORDER — FLECAINIDE ACETATE 50 MG/1
50 TABLET ORAL 2 TIMES DAILY
Qty: 180 TABLET | Refills: 3 | Status: SHIPPED | OUTPATIENT
Start: 2024-10-01

## 2024-10-14 ENCOUNTER — OFFICE VISIT (OUTPATIENT)
Dept: CARDIOLOGY | Facility: CLINIC | Age: 64
End: 2024-10-14
Payer: MEDICARE

## 2024-10-14 VITALS
DIASTOLIC BLOOD PRESSURE: 80 MMHG | HEART RATE: 68 BPM | SYSTOLIC BLOOD PRESSURE: 130 MMHG | WEIGHT: 293 LBS | RESPIRATION RATE: 18 BRPM | BODY MASS INDEX: 47.09 KG/M2 | OXYGEN SATURATION: 96 % | HEIGHT: 66 IN

## 2024-10-14 DIAGNOSIS — I47.10 PAROXYSMAL SVT (SUPRAVENTRICULAR TACHYCARDIA): ICD-10-CM

## 2024-10-14 DIAGNOSIS — I10 ESSENTIAL HYPERTENSION: Chronic | ICD-10-CM

## 2024-10-14 DIAGNOSIS — G47.33 OSA TREATED WITH BIPAP: Chronic | ICD-10-CM

## 2024-10-14 DIAGNOSIS — R07.89 OTHER CHEST PAIN: Primary | ICD-10-CM

## 2024-10-14 DIAGNOSIS — I25.10 NONOBSTRUCTIVE ATHEROSCLEROSIS OF CORONARY ARTERY: ICD-10-CM

## 2024-10-14 PROCEDURE — 1159F MED LIST DOCD IN RCRD: CPT | Performed by: NURSE PRACTITIONER

## 2024-10-14 PROCEDURE — 3075F SYST BP GE 130 - 139MM HG: CPT | Performed by: NURSE PRACTITIONER

## 2024-10-14 PROCEDURE — 99214 OFFICE O/P EST MOD 30 MIN: CPT | Performed by: NURSE PRACTITIONER

## 2024-10-14 PROCEDURE — 1160F RVW MEDS BY RX/DR IN RCRD: CPT | Performed by: NURSE PRACTITIONER

## 2024-10-14 PROCEDURE — 3079F DIAST BP 80-89 MM HG: CPT | Performed by: NURSE PRACTITIONER

## 2024-10-14 RX ORDER — ATORVASTATIN CALCIUM 20 MG/1
20 TABLET, FILM COATED ORAL NIGHTLY
COMMUNITY
Start: 2024-07-16 | End: 2025-07-17

## 2024-10-14 NOTE — PROGRESS NOTES
Subjective:     Encounter Date:10/14/2024      Patient ID: Barbara Tapia is a 64 y.o. female     Chief Complaint:  Chest Pain   This is a chronic problem. The current episode started more than 1 month ago. The problem occurs intermittently. The problem has been unchanged. The pain is present in the lateral region. The pain is severe. The quality of the pain is described as sharp. Associated symptoms include back pain, malaise/fatigue and palpitations (rare). Pertinent negatives include no abdominal pain, cough, dizziness, fever, headaches, hemoptysis, nausea, near-syncope, orthopnea, PND, shortness of breath, syncope or vomiting.     Patient presents today for routine cardiology follow up. She also notes her LOOP is JORGE and she is asking what she needs to do about it. She notes she is not sure where it is and it does not bother her. Overall she is stable. She has chronic back pain and joint pain. Very inactive. She also notes she is sleepy. She continues to have rare- sharp shooting chest pain. This has been ongoing for some time. This is non exertional. She does have some shortness of breath. Patient is followed for nonobstructive coronary artery disease and paroxysmal SVT. Patient's SVT is managed on Flecainide. She notes rare palpitations. She had a Loop placed in 2021. She had a heart cath in 2021 with up to 50% of mid LAD- and otherwise no obstructive coronary artery disease. Patient has hypertension, hyperlipidemia, obesity, chronic pain, sleep apnea, and type II diabetes. Follows with Dr. Ocampo as her PCP.     The following portions of the patient's history were reviewed and updated as appropriate: allergies, current medications, past medical history, past social history, past and problem list.    Allergies   Allergen Reactions    Azelastine-Fluticasone Itching    Contrast Dye (Echo Or Unknown Ct/Mr) Nausea And Vomiting    Iodides Nausea And Vomiting    Metformin And Related Itching    Oxycodone-Aspirin Nausea  And Vomiting    Nisoldipine Er Rash    Pregabalin Rash    Sular [Nisoldipine] Rash    Sulfa Antibiotics Rash       Current Outpatient Medications:     albuterol (PROVENTIL HFA;VENTOLIN HFA) 108 (90 BASE) MCG/ACT inhaler, 2 puffs 4 (Four) Times a Day., Disp: , Rfl:     amLODIPine (NORVASC) 10 MG tablet, Take 1 tablet by mouth Daily., Disp: , Rfl:     aspirin 81 MG EC tablet, Take 1 tablet by mouth Daily., Disp: , Rfl:     atorvastatin (LIPITOR) 20 MG tablet, Take 1 tablet by mouth Every Night., Disp: , Rfl:     diazePAM (VALIUM) 5 MG tablet, Take 1 tablet by mouth Every 12 (Twelve) Hours As Needed., Disp: , Rfl:     DULoxetine (CYMBALTA) 60 MG capsule, Take 1 capsule by mouth 2 (Two) Times a Day., Disp: , Rfl:     fexofenadine (ALLEGRA) 180 MG tablet, Take 1 tablet by mouth As Needed., Disp: , Rfl:     flecainide (TAMBOCOR) 50 MG tablet, TAKE 1 TABLET BY MOUTH 2 (TWO) TIMES A DAY., Disp: 180 tablet, Rfl: 3    fluticasone (FLONASE) 50 MCG/ACT nasal spray, Administer 2 sprays into the nostril(s) as directed by provider As Needed., Disp: , Rfl:     hydroCHLOROthiazide 25 MG tablet, TAKE 1/2 (ONE HALF) TABLETS BY MOUTH DAILY, Disp: 45 tablet, Rfl: 3    insulin glargine (LANTUS) 100 UNIT/ML injection, Inject 100 Units under the skin into the appropriate area as directed Every Night., Disp: , Rfl:     insulin lispro (humaLOG) 100 UNIT/ML injection, Inject 55 Units under the skin into the appropriate area as directed 2 (Two) Times a Day. 62 units at night, Disp: , Rfl:     isosorbide mononitrate (IMDUR) 30 MG 24 hr tablet, TAKE 1 TABLET BY MOUTH EVERY MORNING., Disp: 90 tablet, Rfl: 3    losartan (COZAAR) 100 MG tablet, Take 1 tablet by mouth Daily., Disp: , Rfl:     meclizine (ANTIVERT) 12.5 MG tablet, Take 1 tablet by mouth 2 (Two) Times a Day., Disp: , Rfl:     metoprolol tartrate (LOPRESSOR) 100 MG tablet, TAKE 1 TABLET BY MOUTH 2 (TWO) TIMES A DAY., Disp: 180 tablet, Rfl: 4    nystatin (MYCOSTATIN) 442401 UNIT/ML  suspension, Take 5 mL by mouth As Needed., Disp: , Rfl:     tiZANidine (ZANAFLEX) 4 MG tablet, 1 tablet 3 (Three) Times a Day As Needed for Muscle Spasms., Disp: , Rfl:     traMADol (ULTRAM) 50 MG tablet, Take 1 tablet by mouth Every 6 (Six) Hours As Needed for Moderate Pain., Disp: , Rfl:     vitamin D (ERGOCALCIFEROL) 1.25 MG (06355 UT) capsule capsule, Take 1 capsule by mouth 1 (One) Time Per Week., Disp: , Rfl:     Social History     Socioeconomic History    Marital status:    Tobacco Use    Smoking status: Never    Smokeless tobacco: Never   Vaping Use    Vaping status: Never Used   Substance and Sexual Activity    Alcohol use: No    Drug use: No    Sexual activity: Defer       Review of Systems   Constitutional: Positive for malaise/fatigue. Negative for chills, decreased appetite, fever, weight gain and weight loss.   HENT:  Negative for nosebleeds.    Eyes:  Negative for visual disturbance.   Cardiovascular:  Positive for chest pain and palpitations (rare). Negative for dyspnea on exertion, leg swelling, near-syncope, orthopnea, paroxysmal nocturnal dyspnea and syncope.   Respiratory:  Negative for cough, hemoptysis, shortness of breath and snoring.    Endocrine: Negative for cold intolerance and heat intolerance.   Hematologic/Lymphatic: Negative for bleeding problem. Does not bruise/bleed easily.   Skin:  Negative for rash.   Musculoskeletal:  Positive for arthritis, back pain, joint pain and joint swelling. Negative for falls.   Gastrointestinal:  Negative for abdominal pain, constipation, diarrhea, heartburn, melena, nausea and vomiting.   Genitourinary:  Negative for hematuria.   Neurological:  Negative for dizziness, headaches and light-headedness.   Psychiatric/Behavioral:  Negative for altered mental status.    Allergic/Immunologic: Negative for persistent infections.              Objective:     Constitutional:       Appearance: Not in distress. Obese and frail. Chronically ill-appearing.  "  Eyes:      Pupils: Pupils are equal, round, and reactive to light.   HENT:      Head: Normocephalic and atraumatic.      Nose: Nose normal.    Mouth/Throat:      Dentition: Normal.   Pulmonary:      Effort: Pulmonary effort is normal.      Breath sounds: Normal breath sounds.   Chest:      Chest wall: Not tender to palpatation.   Cardiovascular:      PMI at left midclavicular line. Normal rate. Regular rhythm.      Murmurs: There is no murmur.   Pulses:     Intact distal pulses.   Edema:     Peripheral edema absent.   Abdominal:      General: Bowel sounds are normal.      Palpations: Abdomen is soft.   Musculoskeletal: Normal range of motion.      Cervical back: Normal range of motion. Skin:     General: Skin is warm and dry.   Neurological:      Mental Status: Alert, oriented to person, place, and time and oriented to person, place and time.   Psychiatric:         Attention and Perception: Attention normal.         Mood and Affect: Mood normal.           Procedures  /80 (BP Location: Right arm, Patient Position: Sitting, Cuff Size: Large Adult)   Pulse 68   Resp 18   Ht 167.6 cm (66\")   Wt (!) 141 kg (310 lb)   LMP  (LMP Unknown)   SpO2 96%   BMI 50.04 kg/m²   Lab Review:   I have reviewed       No results found for: \"CHOL\", \"CHLPL\", \"TRIG\", \"HDL\", \"LDL\", \"LDLDIRECT\"   Results for orders placed during the hospital encounter of 06/17/22    Adult Transthoracic Echo Complete w/ Color, Spectral and Contrast if necessary per protocol    Interpretation Summary  · Left ventricular ejection fraction appears to be 56 - 60%. Left ventricular systolic function is normal.  · Left ventricular diastolic function was normal.  · Abnormal global longitudinal LV strain (GLS) = -11.0%  · Estimated right ventricular systolic pressure from tricuspid regurgitation is normal (<35 mmHg).     Assessment:          Diagnosis Plan   1. Other chest pain        2. Nonobstructive atherosclerosis of coronary artery        3. " Paroxysmal SVT (supraventricular tachycardia)        4. Essential hypertension        5. BLADIMIR treated with BiPAP                 Plan:       Chest Pain- atypical pain continued. Discussed possible work up but she wants to monitor for now. She notes she does not think it is her heart. Sharp and lateral pain- lasting a few seconds. Continue Imdur.  Nonobstructive Coronary Artery Disease- last cath in 2021 with no intervention. Continue Aspirin 81 mg daily. Continue Imdur 30 mg daily. Continue Statin, Lopressor and Losartan.   Paroxysmal SVT- well controlled on Flecainide and Lopressor. Continue current regimen. Wrote down medications for her at her request today and wrote down what they are for.   Hypertension- blood pressure stable. Continue current regimen.   Hyperlipidemia- continue Lipitor. Lipids monitored by PCP  Sleep Apnea- reports compliance.     Follow up in 6 months with Dr. Singh

## 2024-10-16 ENCOUNTER — TRANSCRIBE ORDERS (OUTPATIENT)
Dept: ADMINISTRATIVE | Age: 64
End: 2024-10-16

## 2024-10-16 DIAGNOSIS — Z12.31 ENCOUNTER FOR SCREENING MAMMOGRAM FOR MALIGNANT NEOPLASM OF BREAST: Primary | ICD-10-CM

## 2024-12-17 ENCOUNTER — HOSPITAL ENCOUNTER (OUTPATIENT)
Dept: WOMENS IMAGING | Age: 64
Discharge: HOME | End: 2024-12-17
Payer: MEDICARE

## 2024-12-17 DIAGNOSIS — Z12.31 ENCOUNTER FOR SCREENING MAMMOGRAM FOR MALIGNANT NEOPLASM OF BREAST: ICD-10-CM

## 2024-12-17 PROCEDURE — 77063 BREAST TOMOSYNTHESIS BI: CPT

## 2024-12-26 ENCOUNTER — HOSPITAL ENCOUNTER (OUTPATIENT)
Dept: WOMENS IMAGING | Age: 64
Discharge: HOME | End: 2024-12-26
Payer: MEDICARE

## 2024-12-26 DIAGNOSIS — R92.8 ABNORMAL MAMMOGRAM: ICD-10-CM

## 2024-12-26 PROCEDURE — 76642 ULTRASOUND BREAST LIMITED: CPT

## 2025-05-09 ENCOUNTER — TELEPHONE (OUTPATIENT)
Dept: CARDIOLOGY | Facility: CLINIC | Age: 65
End: 2025-05-09
Payer: MEDICARE

## 2025-05-29 RX ORDER — ISOSORBIDE MONONITRATE 30 MG/1
30 TABLET, EXTENDED RELEASE ORAL EVERY MORNING
Qty: 90 TABLET | Refills: 0 | Status: SHIPPED | OUTPATIENT
Start: 2025-05-29

## 2025-06-30 RX ORDER — METOPROLOL TARTRATE 100 MG/1
100 TABLET ORAL 2 TIMES DAILY
Qty: 180 TABLET | Refills: 3 | Status: SHIPPED | OUTPATIENT
Start: 2025-06-30

## 2025-07-18 RX ORDER — HYDROCHLOROTHIAZIDE 25 MG/1
TABLET ORAL
Qty: 45 TABLET | Refills: 3 | Status: SHIPPED | OUTPATIENT
Start: 2025-07-18

## 2025-08-13 ENCOUNTER — OFFICE VISIT (OUTPATIENT)
Dept: CARDIOLOGY | Facility: CLINIC | Age: 65
End: 2025-08-13
Payer: MEDICARE

## 2025-08-13 VITALS
SYSTOLIC BLOOD PRESSURE: 140 MMHG | BODY MASS INDEX: 47.09 KG/M2 | WEIGHT: 293 LBS | OXYGEN SATURATION: 95 % | HEART RATE: 71 BPM | HEIGHT: 66 IN | DIASTOLIC BLOOD PRESSURE: 80 MMHG | RESPIRATION RATE: 18 BRPM

## 2025-08-13 DIAGNOSIS — G47.33 OBSTRUCTIVE SLEEP APNEA SYNDROME: ICD-10-CM

## 2025-08-13 DIAGNOSIS — I25.10 NONOBSTRUCTIVE ATHEROSCLEROSIS OF CORONARY ARTERY: ICD-10-CM

## 2025-08-13 DIAGNOSIS — E78.2 MIXED HYPERLIPIDEMIA: Chronic | ICD-10-CM

## 2025-08-13 DIAGNOSIS — R07.89 OTHER CHEST PAIN: Primary | ICD-10-CM

## 2025-08-13 DIAGNOSIS — I10 ESSENTIAL HYPERTENSION: Chronic | ICD-10-CM

## 2025-08-13 DIAGNOSIS — I47.10 PAROXYSMAL SVT (SUPRAVENTRICULAR TACHYCARDIA): ICD-10-CM

## 2025-08-13 PROCEDURE — 3077F SYST BP >= 140 MM HG: CPT | Performed by: NURSE PRACTITIONER

## 2025-08-13 PROCEDURE — 1159F MED LIST DOCD IN RCRD: CPT | Performed by: NURSE PRACTITIONER

## 2025-08-13 PROCEDURE — 93000 ELECTROCARDIOGRAM COMPLETE: CPT | Performed by: NURSE PRACTITIONER

## 2025-08-13 PROCEDURE — 1160F RVW MEDS BY RX/DR IN RCRD: CPT | Performed by: NURSE PRACTITIONER

## 2025-08-13 PROCEDURE — 99214 OFFICE O/P EST MOD 30 MIN: CPT | Performed by: NURSE PRACTITIONER

## 2025-08-13 PROCEDURE — 3079F DIAST BP 80-89 MM HG: CPT | Performed by: NURSE PRACTITIONER

## 2025-08-13 RX ORDER — ATORVASTATIN CALCIUM 20 MG/1
20 TABLET, FILM COATED ORAL DAILY
COMMUNITY
End: 2025-08-13 | Stop reason: SDUPTHER

## 2025-08-13 RX ORDER — ATORVASTATIN CALCIUM 40 MG/1
40 TABLET, FILM COATED ORAL DAILY
Qty: 30 TABLET | Refills: 11 | Status: SHIPPED | OUTPATIENT
Start: 2025-08-13

## 2025-08-20 RX ORDER — ISOSORBIDE MONONITRATE 30 MG/1
30 TABLET, EXTENDED RELEASE ORAL EVERY MORNING
Qty: 90 TABLET | Refills: 3 | Status: SHIPPED | OUTPATIENT
Start: 2025-08-20

## (undated) DEVICE — CATH F5 INF AL I 100CM: Brand: INFINITI

## (undated) DEVICE — HEARTRAIL III GUIDING CATHETER: Brand: HEARTRAIL

## (undated) DEVICE — PIN DISTRACT TI 14MM STRL

## (undated) DEVICE — GW STARTER FXD CORE J .035 3X260CM 3MM

## (undated) DEVICE — GLV SURG DERMASSURE GRN LF PF 8.0

## (undated) DEVICE — MODEL BT2000 P/N 700287-012KIT CONTENTS: MANIFOLD WITH SALINE AND CONTRAST PORTS, SALINE TUBING WITH SPIKE AND HAND SYRINGE, TRANSDUCER: Brand: BT2000 AUTOMATED MANIFOLD KIT

## (undated) DEVICE — ANTIBACTERIAL UNDYED BRAIDED (POLYGLACTIN 910), SYNTHETIC ABSORBABLE SUTURE: Brand: COATED VICRYL

## (undated) DEVICE — SUP ARMBRD ART/LINE BLU

## (undated) DEVICE — SOL IRR NACL 0.9PCT BT 1000ML

## (undated) DEVICE — APPL CHLORAPREP HI/LITE 26ML ORNG

## (undated) DEVICE — SYS SKIN CLS DERMABOND PRINEO W/22CM MESH TP

## (undated) DEVICE — GLIDESHEATH SLENDER STAINLESS STEEL KIT: Brand: GLIDESHEATH SLENDER

## (undated) DEVICE — SOL NACL INJ 0.9PCT 50ML

## (undated) DEVICE — CATH F5 INF JL 3.5 100CM: Brand: INFINITI

## (undated) DEVICE — MSK O2 MD CONCENTR A/ LF 7FT 1P/U

## (undated) DEVICE — 3.0MM PRECISION NEURO (MATCH HEAD)

## (undated) DEVICE — GLV SURG GRN DERMASSURE LF PF 7.5

## (undated) DEVICE — TP SILK DURAPORE 3IN

## (undated) DEVICE — ELECTRD PAD DEFIB A/

## (undated) DEVICE — TBG SMPL FLTR LINE NASL 02/C02 A/ BX/100

## (undated) DEVICE — HALTR TRACT HD CERV STD UNIV

## (undated) DEVICE — CUFF,BP,DISP,1 TUBE,ADULT,HP: Brand: MEDLINE

## (undated) DEVICE — PK SPINE CERV ANT 30

## (undated) DEVICE — CATH F5 INF 3DRC 100CM: Brand: INFINITI

## (undated) DEVICE — GLV SURG DERMASSURE GRN LF PF 7.0

## (undated) DEVICE — DRAPE,UTILITY,TAPE,15X26,STERILE: Brand: MEDLINE

## (undated) DEVICE — CONMED SCOPE SAVER BITE BLOCK, 20X27 MM: Brand: SCOPE SAVER

## (undated) DEVICE — COPILOT BLEEDBACK CONTROL VALVE: Brand: COPILOT

## (undated) DEVICE — CATH NO TORQ RT 5FR

## (undated) DEVICE — GLV SURG SENSICARE W/ALOE PF LF 7.5 STRL

## (undated) DEVICE — SPNG DISSCT CHRRY 3/8IN STRL PK/5

## (undated) DEVICE — MODEL AT P65, P/N 701554-001KIT CONTENTS: HAND CONTROLLER, 3-WAY HIGH-PRESSURE STOPCOCK WITH ROTATING END AND PREMIUM HIGH-PRESSURE TUBING: Brand: ANGIOTOUCH® KIT

## (undated) DEVICE — CVR UNIV C/ARM

## (undated) DEVICE — COLR CERV VISTA TX 1SZ ADJ

## (undated) DEVICE — PK CATH CARD 30 CA/4

## (undated) DEVICE — Device: Brand: DEFENDO AIR/WATER/SUCTION AND BIOPSY VALVE

## (undated) DEVICE — TR BAND RADIAL ARTERY COMPRESSION DEVICE: Brand: TR BAND

## (undated) DEVICE — PACK,UNIVERSAL,NO GOWNS: Brand: MEDLINE

## (undated) DEVICE — 3M™ STERI-STRIP™ REINFORCED ADHESIVE SKIN CLOSURES, R1547, 1/2 IN X 4 IN (12 MM X 100 MM), 6 STRIPS/ENVELOPE: Brand: 3M™ STERI-STRIP™

## (undated) DEVICE — 3M™ STERI-DRAPE™ INSTRUMENT POUCH 1018: Brand: STERI-DRAPE™

## (undated) DEVICE — CATH F5 INF AR I MOD 100CM: Brand: INFINITI

## (undated) DEVICE — THE CHANNEL CLEANING BRUSH IS A NYLON FLEXI BRUSH ATTACHED TO A FLEXIBLE PLASTIC SHEATH DESIGNED TO SAFELY REMOVE DEBRIS FROM FLEXIBLE ENDOSCOPES.

## (undated) DEVICE — SOLIDIFIER LIQUI LOC PLUS 2000CC

## (undated) DEVICE — 4-PORT MANIFOLD: Brand: NEPTUNE 2

## (undated) DEVICE — GW PRESSUREWIRE X WIRELESS FFR 175CM

## (undated) DEVICE — CATH F5 INF AR MOD 100CM: Brand: INFINITI

## (undated) DEVICE — ELECTRD NDL EDGE/INSUL/PFTE.787MM 2.84IN

## (undated) DEVICE — PK CATH CARD 30

## (undated) DEVICE — PK TURNOVER RM ADV

## (undated) DEVICE — SENSR O2 OXIMAX FNGR A/ 18IN NONSTR

## (undated) DEVICE — GLV SURG BIOGEL LTX PF 7 1/2

## (undated) DEVICE — GLV SURG BIOGEL LTX PF 6 1/2

## (undated) DEVICE — ENDOGATOR AUXILIARY WATER JET CONNECTOR: Brand: ENDOGATOR

## (undated) DEVICE — RADIFOCUS OPTITORQUE ANGIOGRAPHIC CATHETER: Brand: OPTITORQUE

## (undated) DEVICE — GLV SURG TRIUMPH PF LTX 6.5 STRL

## (undated) DEVICE — TOOL INSRT GW MTL OR PLSTC